# Patient Record
Sex: MALE | Race: WHITE | NOT HISPANIC OR LATINO | Employment: UNEMPLOYED | URBAN - NONMETROPOLITAN AREA
[De-identification: names, ages, dates, MRNs, and addresses within clinical notes are randomized per-mention and may not be internally consistent; named-entity substitution may affect disease eponyms.]

---

## 2017-09-12 ENCOUNTER — APPOINTMENT (OUTPATIENT)
Dept: GENERAL RADIOLOGY | Facility: HOSPITAL | Age: 57
End: 2017-09-12

## 2017-09-12 ENCOUNTER — HOSPITAL ENCOUNTER (EMERGENCY)
Facility: HOSPITAL | Age: 57
Discharge: HOME | End: 2017-09-12
Attending: EMERGENCY MEDICINE | Admitting: EMERGENCY MEDICINE

## 2017-09-12 VITALS
HEIGHT: 67 IN | DIASTOLIC BLOOD PRESSURE: 78 MMHG | BODY MASS INDEX: 21.97 KG/M2 | OXYGEN SATURATION: 98 % | RESPIRATION RATE: 16 BRPM | SYSTOLIC BLOOD PRESSURE: 125 MMHG | WEIGHT: 140 LBS | TEMPERATURE: 98 F | HEART RATE: 80 BPM

## 2017-09-12 DIAGNOSIS — R93.89 ABNORMAL CXR: ICD-10-CM

## 2017-09-12 DIAGNOSIS — F10.929 ALCOHOL INTOXICATION, WITH UNSPECIFIED COMPLICATION (HCC): Primary | ICD-10-CM

## 2017-09-12 DIAGNOSIS — E87.1 HYPONATREMIA: ICD-10-CM

## 2017-09-12 DIAGNOSIS — R74.8 ELEVATED LIVER ENZYMES: ICD-10-CM

## 2017-09-12 LAB
ALBUMIN SERPL-MCNC: 4.8 G/DL (ref 3.5–5)
ALBUMIN/GLOB SERPL: 1.4 G/DL (ref 1.1–2.5)
ALP SERPL-CCNC: 122 U/L (ref 24–120)
ALT SERPL W P-5'-P-CCNC: 80 U/L (ref 0–54)
AMPHET+METHAMPHET UR QL: NEGATIVE
ANION GAP SERPL CALCULATED.3IONS-SCNC: 18 MMOL/L (ref 4–13)
APTT PPP: 30.1 SECONDS (ref 24.1–34.8)
AST SERPL-CCNC: 89 U/L (ref 7–45)
BARBITURATES UR QL SCN: NEGATIVE
BASOPHILS # BLD AUTO: 0.03 10*3/MM3 (ref 0–0.2)
BASOPHILS NFR BLD AUTO: 0.3 % (ref 0–2)
BENZODIAZ UR QL SCN: NEGATIVE
BILIRUB SERPL-MCNC: 0.7 MG/DL (ref 0.1–1)
BILIRUB UR QL STRIP: NEGATIVE
BUN BLD-MCNC: 17 MG/DL (ref 5–21)
BUN/CREAT SERPL: 18.5 (ref 7–25)
CALCIUM SPEC-SCNC: 9.3 MG/DL (ref 8.4–10.4)
CANNABINOIDS SERPL QL: NEGATIVE
CHLORIDE SERPL-SCNC: 109 MMOL/L (ref 98–110)
CLARITY UR: CLEAR
CO2 SERPL-SCNC: 19 MMOL/L (ref 24–31)
COCAINE UR QL: NEGATIVE
COLOR UR: YELLOW
CREAT BLD-MCNC: 0.92 MG/DL (ref 0.5–1.4)
DEPRECATED RDW RBC AUTO: 45.4 FL (ref 40–54)
EOSINOPHIL # BLD AUTO: 0.17 10*3/MM3 (ref 0–0.7)
EOSINOPHIL NFR BLD AUTO: 1.6 % (ref 0–4)
ERYTHROCYTE [DISTWIDTH] IN BLOOD BY AUTOMATED COUNT: 14.5 % (ref 12–15)
ETHANOL UR QL: 0.28 %
ETHANOL UR QL: 0.39 %
GFR SERPL CREATININE-BSD FRML MDRD: 85 ML/MIN/1.73
GLOBULIN UR ELPH-MCNC: 3.4 GM/DL
GLUCOSE BLD-MCNC: 138 MG/DL (ref 70–100)
GLUCOSE UR STRIP-MCNC: NEGATIVE MG/DL
HCT VFR BLD AUTO: 41.6 % (ref 40–52)
HGB BLD-MCNC: 14.3 G/DL (ref 14–18)
HGB UR QL STRIP.AUTO: NEGATIVE
IMM GRANULOCYTES # BLD: 0.09 10*3/MM3 (ref 0–0.03)
IMM GRANULOCYTES NFR BLD: 0.8 % (ref 0–5)
INR PPP: 1.03 (ref 0.91–1.09)
KETONES UR QL STRIP: NEGATIVE
LEUKOCYTE ESTERASE UR QL STRIP.AUTO: NEGATIVE
LYMPHOCYTES # BLD AUTO: 4.25 10*3/MM3 (ref 0.72–4.86)
LYMPHOCYTES NFR BLD AUTO: 39.6 % (ref 15–45)
MCH RBC QN AUTO: 29.3 PG (ref 28–32)
MCHC RBC AUTO-ENTMCNC: 34.4 G/DL (ref 33–36)
MCV RBC AUTO: 85.2 FL (ref 82–95)
METHADONE UR QL SCN: NEGATIVE
MONOCYTES # BLD AUTO: 0.74 10*3/MM3 (ref 0.19–1.3)
MONOCYTES NFR BLD AUTO: 6.9 % (ref 4–12)
NEUTROPHILS # BLD AUTO: 5.44 10*3/MM3 (ref 1.87–8.4)
NEUTROPHILS NFR BLD AUTO: 50.8 % (ref 39–78)
NITRITE UR QL STRIP: NEGATIVE
NT-PROBNP SERPL-MCNC: 95.7 PG/ML (ref 0–900)
OPIATES UR QL: POSITIVE
PCP UR QL SCN: NEGATIVE
PH UR STRIP.AUTO: 5.5 [PH] (ref 5–8)
PLATELET # BLD AUTO: 211 10*3/MM3 (ref 130–400)
PMV BLD AUTO: 10.8 FL (ref 6–12)
POTASSIUM BLD-SCNC: 4.6 MMOL/L (ref 3.5–5.3)
PROT SERPL-MCNC: 8.2 G/DL (ref 6.3–8.7)
PROT UR QL STRIP: NEGATIVE
PROTHROMBIN TIME: 13.8 SECONDS (ref 11.9–14.6)
RBC # BLD AUTO: 4.88 10*6/MM3 (ref 4.8–5.9)
SODIUM BLD-SCNC: 146 MMOL/L (ref 135–145)
SP GR UR STRIP: 1.01 (ref 1–1.03)
UROBILINOGEN UR QL STRIP: NORMAL
WBC NRBC COR # BLD: 10.72 10*3/MM3 (ref 4.8–10.8)

## 2017-09-12 PROCEDURE — 36415 COLL VENOUS BLD VENIPUNCTURE: CPT

## 2017-09-12 PROCEDURE — 96361 HYDRATE IV INFUSION ADD-ON: CPT

## 2017-09-12 PROCEDURE — 80307 DRUG TEST PRSMV CHEM ANLYZR: CPT | Performed by: EMERGENCY MEDICINE

## 2017-09-12 PROCEDURE — 83880 ASSAY OF NATRIURETIC PEPTIDE: CPT | Performed by: EMERGENCY MEDICINE

## 2017-09-12 PROCEDURE — 85610 PROTHROMBIN TIME: CPT | Performed by: EMERGENCY MEDICINE

## 2017-09-12 PROCEDURE — 25010000002 THIAMINE PER 100 MG: Performed by: EMERGENCY MEDICINE

## 2017-09-12 PROCEDURE — 99284 EMERGENCY DEPT VISIT MOD MDM: CPT

## 2017-09-12 PROCEDURE — 96360 HYDRATION IV INFUSION INIT: CPT

## 2017-09-12 PROCEDURE — 81003 URINALYSIS AUTO W/O SCOPE: CPT | Performed by: EMERGENCY MEDICINE

## 2017-09-12 PROCEDURE — 71010 HC CHEST PA OR AP: CPT

## 2017-09-12 PROCEDURE — 80053 COMPREHEN METABOLIC PANEL: CPT | Performed by: EMERGENCY MEDICINE

## 2017-09-12 PROCEDURE — 73552 X-RAY EXAM OF FEMUR 2/>: CPT

## 2017-09-12 PROCEDURE — 85730 THROMBOPLASTIN TIME PARTIAL: CPT | Performed by: EMERGENCY MEDICINE

## 2017-09-12 PROCEDURE — 85025 COMPLETE CBC W/AUTO DIFF WBC: CPT | Performed by: EMERGENCY MEDICINE

## 2017-09-12 RX ORDER — DEXTROSE MONOHYDRATE 50 MG/ML
100 INJECTION, SOLUTION INTRAVENOUS CONTINUOUS
Status: DISCONTINUED | OUTPATIENT
Start: 2017-09-12 | End: 2017-09-13 | Stop reason: HOSPADM

## 2017-09-12 RX ORDER — THIAMINE HYDROCHLORIDE 100 MG/ML
100 INJECTION, SOLUTION INTRAMUSCULAR; INTRAVENOUS ONCE
Status: COMPLETED | OUTPATIENT
Start: 2017-09-12 | End: 2017-09-12

## 2017-09-12 RX ADMIN — DEXTROSE MONOHYDRATE 100 ML/HR: 50 INJECTION, SOLUTION INTRAVENOUS at 16:09

## 2017-09-12 RX ADMIN — THIAMINE HYDROCHLORIDE 100 MG: 100 INJECTION, SOLUTION INTRAMUSCULAR; INTRAVENOUS at 15:49

## 2017-09-12 RX ADMIN — SODIUM CHLORIDE 500 ML: 9 INJECTION, SOLUTION INTRAVENOUS at 15:02

## 2017-09-12 NOTE — ED PROVIDER NOTES
Subjective   HPI Comments: Patient is a 57-year-old male who reports he did drink 1 pint of vodka today and the patient reports that he fell about 2 hours ago and hit his left leg on a tree.  The patient is unable to give any other information.      History provided by:  Patient      Review of Systems   Unable to perform ROS: Other       No past medical history on file.    No Known Allergies    No past surgical history on file.    No family history on file.    Social History     Social History   • Marital status: Single     Spouse name: N/A   • Number of children: N/A   • Years of education: N/A     Social History Main Topics   • Smoking status: Not on file   • Smokeless tobacco: Not on file   • Alcohol use Not on file   • Drug use: Not on file   • Sexual activity: Not on file     Other Topics Concern   • Not on file     Social History Narrative           Objective   Physical Exam   Constitutional: He is oriented to person, place, and time.   Patient smells of alcohol and is a very poor historian.   HENT:   Head: Normocephalic and atraumatic.   Right Ear: External ear normal.   Left Ear: External ear normal.   Nose: Nose normal.   Mouth/Throat: Oropharynx is clear and moist.   Eyes: Conjunctivae and EOM are normal. Pupils are equal, round, and reactive to light. Right eye exhibits no discharge. Left eye exhibits no discharge.   Neck: Normal range of motion. Neck supple. No tracheal deviation present. No thyromegaly present.   Cardiovascular: Normal rate, regular rhythm, normal heart sounds and intact distal pulses.    No murmur heard.  Pulmonary/Chest: Effort normal and breath sounds normal. No respiratory distress. He exhibits no tenderness.   Occasional coughing.   Abdominal: Soft. He exhibits no distension. There is no tenderness.   Musculoskeletal: Normal range of motion. He exhibits tenderness (Mild pain to palpation of the left anterior distal femur area.). He exhibits no edema or deformity.   Neurological: He  is alert and oriented to person, place, and time. No cranial nerve deficit.   Skin: Skin is warm and dry. No erythema. No pallor.   Psychiatric:   Very poor historian and slow speaking male.   Nursing note and vitals reviewed.      Procedures         ED Course  ED Course                  MDM  Number of Diagnoses or Management Options  Abnormal CXR: new and requires workup  Alcohol intoxication, with unspecified complication:   Elevated liver enzymes:   Hyponatremia: minor     Amount and/or Complexity of Data Reviewed  Clinical lab tests: ordered and reviewed  Tests in the radiology section of CPT®: ordered and reviewed        Final diagnoses:   Alcohol intoxication, with unspecified complication   Elevated liver enzymes   Hyponatremia   Abnormal CXR            Naveed Butts MD  09/13/17 0958

## 2017-09-12 NOTE — ED NOTES
PT HAS VOIDED AGAIN PER URINAL AND IS NOW TALKING ON PHONE WITH SISTER     Toyin Henderson, WINTER  09/12/17 2896

## 2017-09-13 NOTE — ED NOTES
Pt's daughter states there is someone coming to pick him up in the next hour or so. MD made aware.      Dony Sanchez RN  09/12/17 1917

## 2017-09-13 NOTE — ED NOTES
Pt sitting up in bed alert and oriented. Pt given something to drink. States he does not have anyone else he can call to pick him up. Advised pt he will have to stay until his BA is below legal limit unless a ride is present. Pt voiced understanding.      Dony Sanchez RN  09/12/17 2021

## 2019-06-03 ENCOUNTER — HOSPITAL ENCOUNTER (OUTPATIENT)
Facility: HOSPITAL | Age: 59
Setting detail: OBSERVATION
Discharge: NURSING, REHAB OR LONG-TERM CARE | End: 2019-06-04
Attending: NEUROLOGICAL SURGERY | Admitting: NEUROLOGICAL SURGERY

## 2019-06-03 DIAGNOSIS — Z74.09 IMPAIRED FUNCTIONAL MOBILITY, BALANCE, GAIT, AND ENDURANCE: ICD-10-CM

## 2019-06-03 PROCEDURE — G0378 HOSPITAL OBSERVATION PER HR: HCPCS

## 2019-06-03 RX ORDER — BUTALBITAL, ACETAMINOPHEN AND CAFFEINE 50; 325; 40 MG/1; MG/1; MG/1
1 TABLET ORAL EVERY 4 HOURS PRN
Status: DISCONTINUED | OUTPATIENT
Start: 2019-06-03 | End: 2019-06-04 | Stop reason: HOSPADM

## 2019-06-03 RX ADMIN — BUTALBITAL, ACETAMINOPHEN, AND CAFFEINE 1 TABLET: 50; 325; 40 TABLET ORAL at 23:43

## 2019-06-04 VITALS
HEIGHT: 66 IN | HEART RATE: 65 BPM | WEIGHT: 133.31 LBS | TEMPERATURE: 97.5 F | BODY MASS INDEX: 21.43 KG/M2 | RESPIRATION RATE: 18 BRPM | SYSTOLIC BLOOD PRESSURE: 120 MMHG | DIASTOLIC BLOOD PRESSURE: 72 MMHG | OXYGEN SATURATION: 96 %

## 2019-06-04 PROBLEM — S06.33AA CEREBRAL CONTUSION (HCC): Status: ACTIVE | Noted: 2019-06-04

## 2019-06-04 LAB — GLUCOSE BLDC GLUCOMTR-MCNC: 119 MG/DL (ref 70–130)

## 2019-06-04 PROCEDURE — 99219 PR INITIAL OBSERVATION CARE/DAY 50 MINUTES: CPT | Performed by: NURSE PRACTITIONER

## 2019-06-04 PROCEDURE — 82962 GLUCOSE BLOOD TEST: CPT

## 2019-06-04 PROCEDURE — 97161 PT EVAL LOW COMPLEX 20 MIN: CPT | Performed by: PHYSICAL THERAPIST

## 2019-06-04 PROCEDURE — G0378 HOSPITAL OBSERVATION PER HR: HCPCS

## 2019-06-04 RX ORDER — LEVETIRACETAM 500 MG/1
500 TABLET ORAL 2 TIMES DAILY
Status: DISCONTINUED | OUTPATIENT
Start: 2019-06-04 | End: 2019-06-04 | Stop reason: HOSPADM

## 2019-06-04 RX ORDER — DOCUSATE SODIUM 100 MG/1
100 CAPSULE, LIQUID FILLED ORAL 2 TIMES DAILY
Status: DISCONTINUED | OUTPATIENT
Start: 2019-06-04 | End: 2019-06-04 | Stop reason: HOSPADM

## 2019-06-04 RX ORDER — THIAMINE MONONITRATE (VIT B1) 100 MG
100 TABLET ORAL DAILY
Status: DISCONTINUED | OUTPATIENT
Start: 2019-06-04 | End: 2019-06-04 | Stop reason: HOSPADM

## 2019-06-04 RX ORDER — THIAMINE MONONITRATE (VIT B1) 100 MG
100 TABLET ORAL DAILY
Status: ON HOLD | COMMUNITY
End: 2021-01-01

## 2019-06-04 RX ORDER — METOPROLOL SUCCINATE 50 MG/1
50 TABLET, EXTENDED RELEASE ORAL DAILY
Status: ON HOLD | COMMUNITY
End: 2021-01-01

## 2019-06-04 RX ORDER — SERTRALINE HYDROCHLORIDE 100 MG/1
150 TABLET, FILM COATED ORAL NIGHTLY
Status: ON HOLD | COMMUNITY
End: 2021-01-01

## 2019-06-04 RX ORDER — FOLIC ACID 1 MG/1
1 TABLET ORAL DAILY
Status: ON HOLD | COMMUNITY
End: 2021-01-01

## 2019-06-04 RX ORDER — HYDRALAZINE HYDROCHLORIDE 10 MG/1
10 TABLET, FILM COATED ORAL 3 TIMES DAILY PRN
Status: DISCONTINUED | OUTPATIENT
Start: 2019-06-04 | End: 2019-06-04 | Stop reason: HOSPADM

## 2019-06-04 RX ORDER — SIMVASTATIN 10 MG
10 TABLET ORAL NIGHTLY
Status: ON HOLD | COMMUNITY
End: 2021-01-01

## 2019-06-04 RX ORDER — ONDANSETRON 2 MG/ML
4 INJECTION INTRAMUSCULAR; INTRAVENOUS EVERY 6 HOURS PRN
Status: DISCONTINUED | OUTPATIENT
Start: 2019-06-04 | End: 2019-06-04 | Stop reason: HOSPADM

## 2019-06-04 RX ORDER — MELOXICAM 15 MG/1
15 TABLET ORAL DAILY
Status: ON HOLD | COMMUNITY
End: 2020-08-12

## 2019-06-04 RX ORDER — GABAPENTIN 300 MG/1
300 CAPSULE ORAL 3 TIMES DAILY
Status: ON HOLD | COMMUNITY
End: 2021-01-01

## 2019-06-04 RX ORDER — PANTOPRAZOLE SODIUM 40 MG/1
40 TABLET, DELAYED RELEASE ORAL 2 TIMES DAILY
Status: ON HOLD | COMMUNITY
End: 2021-01-01

## 2019-06-04 RX ORDER — ATORVASTATIN CALCIUM 10 MG/1
10 TABLET, FILM COATED ORAL DAILY
Status: DISCONTINUED | OUTPATIENT
Start: 2019-06-04 | End: 2019-06-04 | Stop reason: HOSPADM

## 2019-06-04 RX ORDER — ZOLPIDEM TARTRATE 5 MG/1
5 TABLET ORAL NIGHTLY
Status: ON HOLD | COMMUNITY
End: 2021-01-01

## 2019-06-04 RX ORDER — DOCUSATE SODIUM 100 MG/1
100 CAPSULE, LIQUID FILLED ORAL 2 TIMES DAILY
Status: ON HOLD | COMMUNITY
End: 2021-01-01

## 2019-06-04 RX ORDER — TRAMADOL HYDROCHLORIDE 50 MG/1
100 TABLET ORAL EVERY 8 HOURS PRN
Status: DISCONTINUED | OUTPATIENT
Start: 2019-06-04 | End: 2019-06-04 | Stop reason: HOSPADM

## 2019-06-04 RX ORDER — CLONAZEPAM 0.5 MG/1
0.5 TABLET ORAL 2 TIMES DAILY
Status: ON HOLD | COMMUNITY
End: 2021-01-01

## 2019-06-04 RX ORDER — LISINOPRIL 20 MG/1
20 TABLET ORAL DAILY
Status: ON HOLD | COMMUNITY
End: 2020-08-16 | Stop reason: SDUPTHER

## 2019-06-04 RX ORDER — ACETAMINOPHEN 325 MG/1
650 TABLET ORAL EVERY 6 HOURS PRN
Status: ON HOLD | COMMUNITY
End: 2021-01-01

## 2019-06-04 RX ORDER — PANTOPRAZOLE SODIUM 40 MG/1
40 TABLET, DELAYED RELEASE ORAL DAILY
Status: DISCONTINUED | OUTPATIENT
Start: 2019-06-04 | End: 2019-06-04 | Stop reason: HOSPADM

## 2019-06-04 RX ORDER — SODIUM CHLORIDE 0.9 % (FLUSH) 0.9 %
3 SYRINGE (ML) INJECTION EVERY 12 HOURS SCHEDULED
Status: DISCONTINUED | OUTPATIENT
Start: 2019-06-04 | End: 2019-06-04 | Stop reason: HOSPADM

## 2019-06-04 RX ORDER — ACETAMINOPHEN 325 MG/1
650 TABLET ORAL EVERY 4 HOURS PRN
Status: DISCONTINUED | OUTPATIENT
Start: 2019-06-04 | End: 2019-06-04 | Stop reason: HOSPADM

## 2019-06-04 RX ORDER — TRAMADOL HYDROCHLORIDE 50 MG/1
50 TABLET ORAL EVERY 8 HOURS PRN
COMMUNITY
End: 2020-08-16 | Stop reason: HOSPADM

## 2019-06-04 RX ORDER — LISINOPRIL 20 MG/1
20 TABLET ORAL DAILY
Status: DISCONTINUED | OUTPATIENT
Start: 2019-06-04 | End: 2019-06-04 | Stop reason: HOSPADM

## 2019-06-04 RX ORDER — GABAPENTIN 300 MG/1
300 CAPSULE ORAL NIGHTLY
Status: DISCONTINUED | OUTPATIENT
Start: 2019-06-04 | End: 2019-06-04 | Stop reason: HOSPADM

## 2019-06-04 RX ORDER — LEVETIRACETAM 500 MG/1
500 TABLET ORAL 2 TIMES DAILY
Qty: 10 TABLET | Refills: 0 | Status: SHIPPED | OUTPATIENT
Start: 2019-06-04 | End: 2019-06-09

## 2019-06-04 RX ORDER — ONDANSETRON 4 MG/1
4 TABLET, FILM COATED ORAL EVERY 6 HOURS PRN
Status: ON HOLD | COMMUNITY
End: 2021-01-01

## 2019-06-04 RX ORDER — HYDRALAZINE HYDROCHLORIDE 10 MG/1
10 TABLET, FILM COATED ORAL EVERY 8 HOURS PRN
COMMUNITY
End: 2020-08-16 | Stop reason: HOSPADM

## 2019-06-04 RX ORDER — BUTALBITAL, ACETAMINOPHEN AND CAFFEINE 50; 325; 40 MG/1; MG/1; MG/1
1 TABLET ORAL EVERY 4 HOURS PRN
Qty: 20 TABLET | Refills: 0 | Status: ON HOLD | OUTPATIENT
Start: 2019-06-04 | End: 2020-08-12

## 2019-06-04 RX ORDER — MIDODRINE HYDROCHLORIDE 5 MG/1
5 TABLET ORAL 3 TIMES DAILY
COMMUNITY
End: 2020-08-16 | Stop reason: HOSPADM

## 2019-06-04 RX ORDER — ACETAMINOPHEN 325 MG/1
650 TABLET ORAL EVERY 6 HOURS PRN
Status: DISCONTINUED | OUTPATIENT
Start: 2019-06-04 | End: 2019-06-04 | Stop reason: HOSPADM

## 2019-06-04 RX ORDER — ACETAMINOPHEN 650 MG/1
650 SUPPOSITORY RECTAL EVERY 4 HOURS PRN
Status: DISCONTINUED | OUTPATIENT
Start: 2019-06-04 | End: 2019-06-04 | Stop reason: HOSPADM

## 2019-06-04 RX ORDER — METOPROLOL SUCCINATE 50 MG/1
50 TABLET, EXTENDED RELEASE ORAL DAILY
Status: DISCONTINUED | OUTPATIENT
Start: 2019-06-04 | End: 2019-06-04 | Stop reason: HOSPADM

## 2019-06-04 RX ORDER — CLONAZEPAM 0.5 MG/1
0.5 TABLET ORAL 2 TIMES DAILY
Status: DISCONTINUED | OUTPATIENT
Start: 2019-06-04 | End: 2019-06-04 | Stop reason: HOSPADM

## 2019-06-04 RX ORDER — ONDANSETRON 4 MG/1
4 TABLET, FILM COATED ORAL EVERY 6 HOURS PRN
Status: DISCONTINUED | OUTPATIENT
Start: 2019-06-04 | End: 2019-06-04 | Stop reason: HOSPADM

## 2019-06-04 RX ORDER — SODIUM CHLORIDE 0.9 % (FLUSH) 0.9 %
3-10 SYRINGE (ML) INJECTION AS NEEDED
Status: DISCONTINUED | OUTPATIENT
Start: 2019-06-04 | End: 2019-06-04 | Stop reason: HOSPADM

## 2019-06-04 RX ORDER — ASPIRIN 81 MG/1
81 TABLET, CHEWABLE ORAL DAILY
Status: ON HOLD | COMMUNITY
End: 2020-08-12 | Stop reason: DRUGHIGH

## 2019-06-04 RX ORDER — FOLIC ACID 1 MG/1
1 TABLET ORAL DAILY
Status: DISCONTINUED | OUTPATIENT
Start: 2019-06-04 | End: 2019-06-04 | Stop reason: HOSPADM

## 2019-06-04 RX ADMIN — METOPROLOL SUCCINATE 50 MG: 50 TABLET, FILM COATED, EXTENDED RELEASE ORAL at 10:07

## 2019-06-04 RX ADMIN — LISINOPRIL 20 MG: 20 TABLET ORAL at 10:07

## 2019-06-04 RX ADMIN — DOCUSATE SODIUM 100 MG: 100 CAPSULE ORAL at 10:07

## 2019-06-04 RX ADMIN — SODIUM CHLORIDE, PRESERVATIVE FREE 3 ML: 5 INJECTION INTRAVENOUS at 10:06

## 2019-06-04 RX ADMIN — PANTOPRAZOLE SODIUM 40 MG: 40 TABLET, DELAYED RELEASE ORAL at 10:07

## 2019-06-04 RX ADMIN — TRAMADOL HYDROCHLORIDE 100 MG: 50 TABLET, FILM COATED ORAL at 12:05

## 2019-06-04 RX ADMIN — FOLIC ACID 1 MG: 1 TABLET ORAL at 10:07

## 2019-06-04 RX ADMIN — ATORVASTATIN CALCIUM 10 MG: 10 TABLET, FILM COATED ORAL at 10:06

## 2019-06-04 RX ADMIN — BUTALBITAL, ACETAMINOPHEN, AND CAFFEINE 1 TABLET: 50; 325; 40 TABLET ORAL at 03:40

## 2019-06-04 RX ADMIN — BUTALBITAL, ACETAMINOPHEN, AND CAFFEINE 1 TABLET: 50; 325; 40 TABLET ORAL at 08:32

## 2019-06-04 RX ADMIN — CLONAZEPAM 0.5 MG: 0.5 TABLET ORAL at 10:09

## 2019-06-04 RX ADMIN — Medication 100 MG: at 10:07

## 2019-06-04 RX ADMIN — LEVETIRACETAM 500 MG: 500 TABLET, FILM COATED ORAL at 10:07

## 2019-06-04 RX ADMIN — BUTALBITAL, ACETAMINOPHEN, AND CAFFEINE 1 TABLET: 50; 325; 40 TABLET ORAL at 16:26
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
## 2019-06-04 NOTE — PLAN OF CARE
Problem: Patient Care Overview  Goal: Plan of Care Review  Outcome: Ongoing (interventions implemented as appropriate)   06/04/19 6205   Coping/Psychosocial   Plan of Care Reviewed With patient   Plan of Care Review   Progress improving   OTHER   Outcome Summary A&Ox4. Pain controlled with ordered meds. EJ d/c'ed. D/c'ed back to [facility], who also transported pt.        Problem: Fall Risk (Adult)  Goal: Absence of Fall  Outcome: Ongoing (interventions implemented as appropriate)      Problem: Pain, Chronic (Adult)  Goal: Identify Related Risk Factors and Signs and Symptoms  Outcome: Ongoing (interventions implemented as appropriate)      Problem: Brain Injury, Moderate Traumatic (GCS 9-12) (Adult)  Goal: Signs and Symptoms of Listed Potential Problems Will be Absent, Minimized or Managed (Brain Injury, Moderate Traumatic)  Outcome: Ongoing (interventions implemented as appropriate)

## 2019-06-04 NOTE — PLAN OF CARE
Problem: Patient Care Overview  Goal: Plan of Care Review  Outcome: Ongoing (interventions implemented as appropriate)   06/04/19 0328   Coping/Psychosocial   Plan of Care Reviewed With patient   Plan of Care Review   Progress no change   OTHER   Outcome Summary Pt A&O. Co pain, Fiorcet works well to contorl headache. Rests between care. No neuro changes. Cont to monitor.        Problem: Fall Risk (Adult)  Goal: Identify Related Risk Factors and Signs and Symptoms  Outcome: Outcome(s) achieved Date Met: 06/04/19    Goal: Absence of Fall  Outcome: Ongoing (interventions implemented as appropriate)      Problem: Brain Injury, Moderate Traumatic (GCS 9-12) (Adult)  Goal: Signs and Symptoms of Listed Potential Problems Will be Absent, Minimized or Managed (Brain Injury, Moderate Traumatic)  Outcome: Ongoing (interventions implemented as appropriate)

## 2019-06-04 NOTE — H&P
NEUROSURGERY INITIAL HOSPITAL ENCOUNTER    Assessment/Plan:   Lance Rea is a 59 y.o. male with a significant comorbidity COPD, hypertension, hyperlipidemia, hepatitis C, anxiety, depression, and alcohol abuse.  He presents with a new problem of fall 4 days prior. Physical exam findings of contusion to right forehead and hematoma around right eye, relatively normal neurologic exam.  Their imaging shows right frontal skull base 1 cm cerebral contusion with very mild amount of vasogenic edema surrounding.  No evidence of skull fracture.  Right frontal subcutaneous hematoma.  No evidence of hydrocephalus or intracranial blood products otherwise.    Differential Diagnosis:   Cerebral contusion    Recommendations:  Conservative management  Keppra 500 mg p.o. twice daily for 5 days for seizure prophylaxis  PT evaluation  Fioricet for headache    I discussed the patients findings and my recommendations with patient and nursing staff    Thank you very much for this interesting consult.     Level of Risk: Moderate due to: undiagnosed new problem  MDM: Moderate Complexity  Mod = 11213, High=99223  ___________________________________________________________________    Reason for consult  Cerebral contusion    Chief Complaint: Fall    HPI: Lance Rea is a 59 y.o. male with a significant comorbidity COPD, hypertension, hyperlipidemia, hepatitis C, anxiety, depression, and alcohol abuse with last drink approximately 1 month  ago.  He was transferred to [facility] from [facility] due to image findings of cerebral contusion and associated complaint of headache. Mr. Rea states he slipped and fell from a standing position landing on the concrete floor while at his assisted living residence, [facility].  Onset approximately 4 days prior.  He denies loss of consciousness with fall.  He additionally denies dizziness, blurred vision, nausea, vomiting, or seizure-like activity.    Review of Systems    Constitutional: Negative.    Eyes: Negative.  Negative for visual disturbance.   Respiratory: Negative.    Cardiovascular: Negative.    Gastrointestinal: Negative.    Endocrine: Negative.    Genitourinary: Negative.    Musculoskeletal: Negative.    Skin: Negative.    Allergic/Immunologic: Negative.    Neurological: Positive for headaches. Negative for dizziness and syncope.   Hematological: Negative.    Psychiatric/Behavioral: Negative.    All other systems reviewed and are negative.     Past Medical History:  has a past medical history of Anxiety, COPD (chronic obstructive pulmonary disease) (CMS/[facility]), Depression, Encephalopathy, GERD (gastroesophageal reflux disease), Hepatitis C, Hyperlipidemia, Hypertension, Muscle weakness, and Stroke (CMS/[facility]).    Past Surgical History:  has a past surgical history that includes Cervical spine surgery; Replacement total knee bilateral; Femur Surgery; Cholecystectomy; Colonoscopy; and Esophagogastroduodenoscopy.    Family History: family history is not on file.    Social History:  reports that he has been smoking.  He has never used smokeless tobacco. He reports that he drinks alcohol. He reports that he does not use drugs.    Allergies: Codeine    Home Medications:   Current Facility-Administered Medications:   •  butalbital-acetaminophen-caffeine (FIORICET, ESGIC) -40 MG per tablet 1 tablet, 1 tablet, Oral, Q4H PRN, Nick Carver MD, 1 tablet at 06/04/19 0340    Medications: Scheduled Meds:   Continuous Infusions:   PRN Meds:.•  butalbital-acetaminophen-caffeine    Vital SignsTemp:  [98 °F (36.7 °C)-98.2 °F (36.8 °C)] 98 °F (36.7 °C)  Heart Rate:  [72-77] 77  Resp:  [18] 18  BP: (104-125)/(69-75) 104/69    Physical Exam  Physical Exam   Constitutional: He is oriented to person, place, and time. He appears well-developed and well-nourished.  Non-toxic appearance. He does not have a sickly appearance. He does not appear ill. No distress.   HENT:   Head:  Normocephalic and atraumatic.       Right Ear: Hearing normal.   Left Ear: Hearing normal.   Mouth/Throat: Mucous membranes are normal.   Eyes: Conjunctivae and EOM are normal. Pupils are equal, round, and reactive to light.   Neck: Trachea normal and full passive range of motion without pain. Neck supple.   Cardiovascular: Normal rate and regular rhythm.   Pulmonary/Chest: Effort normal. No accessory muscle usage. No apnea, no tachypnea and no bradypnea. No respiratory distress.   Abdominal: Soft. Normal appearance.   Neurological: He is alert and oriented to person, place, and time.   Reflex Scores:       Bicep reflexes are 2+ on the right side and 2+ on the left side.       Achilles reflexes are 2+ on the right side and 2+ on the left side.  Skin: Skin is warm, dry and intact.   Psychiatric: He has a normal mood and affect. His speech is normal and behavior is normal.   Nursing note and vitals reviewed.    Neurologic Exam     Mental Status   Oriented to person, place, and time.   Attention: normal. Concentration: normal.   Speech: speech is normal   Level of consciousness: alert  Alert and oriented x3.  Follows simple commands.     Cranial Nerves     CN II   Visual fields full to confrontation.     CN III, IV, VI   Pupils are equal, round, and reactive to light.  Extraocular motions are normal.     CN V   Facial sensation intact.     CN VII   Facial expression full, symmetric.     CN VIII   CN VIII normal.     CN IX, X   CN IX normal.     CN XI   CN XI normal.     Motor Exam   Muscle bulk: normal  Overall muscle tone: normal  Right arm tone: normal  Left arm tone: normal  Right arm pronator drift: absent  Left arm pronator drift: absent  Right leg tone: normal  Left leg tone: normal    Strength   Right deltoid: 5/5  Left deltoid: 5/5  Right biceps: 5/5  Left biceps: 5/5  Right triceps: 5/5  Left triceps: 5/5  Right wrist extension: 5/5  Left wrist extension: 5/5  Right iliopsoas: 5/5  Left iliopsoas: 5/5  Right  quadriceps: 5/5  Left quadriceps: 5/5  Right anterior tibial: 5/5  Left anterior tibial: 5/5  Right posterior tibial: 5/5  Left posterior tibial: 5/5  Moves all extremities equal and symmetric     Sensory Exam   Right arm light touch: normal  Left arm light touch: normal  Right leg light touch: normal  Left leg light touch: normal    Gait, Coordination, and Reflexes     Tremor   Resting tremor: absent  Intention tremor: absent  Action tremor: absent    Reflexes   Right biceps: 2+  Left biceps: 2+  Right achilles: 2+  Left achilles: 2+  Right plantar: normal  Left plantar: normal  Right Gaspar: absent  Left Gaspar: absent  Right ankle clonus: absent  Left ankle clonus: absent    Results Review:   Independent review and interpretation of imaging  Imaging Results (last 24 hours)     ** No results found for the last 24 hours. **        MRI brain:  MRI spine:   CT Head: Noncontrast CT of the head performed at outside hospital.  Evidence of a right frontal skull base 1 cm cerebral contusion with very mild amount of vasogenic edema surrounding.  No evidence of skull fracture.  Right frontal subcutaneous hematoma.  No evidence of hydrocephalus or intracranial blood products otherwise.      CT c-spine:  CT t-spine:  CT l-spine:  X-ray:    I reviewed the patient's new clinical results.  Lab Results (last 24 hours)     ** No results found for the last 24 hours. **        Dylan Ruffin, WAYNE
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
## 2019-06-05 NOTE — THERAPY DISCHARGE NOTE
Acute Care - Physical Therapy Discharge Summary  [facility]       Patient Name: Lance Rea  : 1960  MRN: [id number]    Today's Date: 2019  Onset of Illness/Injury or Date of Surgery: 19    Date of Referral to PT: 19  Referring Physician: Dr. Carver      Admit Date: 6/3/2019      PT Recommendation and Plan    Visit Dx:    ICD-10-CM ICD-9-CM   1. Impaired functional mobility, balance, gait, and endurance Z74.09 V49.89       Outcome Measures     Row Name 19 1039             How much help from another person do you currently need...    Turning from your back to your side while in flat bed without using bedrails?  4  -MS      Moving from lying on back to sitting on the side of a flat bed without bedrails?  4  -MS      Moving to and from a bed to a chair (including a wheelchair)?  3  -MS      Standing up from a chair using your arms (e.g., wheelchair, bedside chair)?  3  -MS      Climbing 3-5 steps with a railing?  3  -MS      To walk in hospital room?  3  -MS      AM-PAC 6 Clicks Score  20  -MS         Functional Assessment    Outcome Measure Options  AM-PAC 6 Clicks Basic Mobility (PT)  -MS        User Key  (r) = Recorded By, (t) = Taken By, (c) = Cosigned By    Initials Name Provider Type    Mala Whitfield R, PT, DPT, NCS Physical Therapist              Rehab Goal Summary     Row Name 19 0737             Gait Training Goal 1 (PT)    Activity/Assistive Device (Gait Training Goal 1, PT)  gait (walking locomotion);backward stepping;decrease fall risk;forward stepping;improve balance and speed;increase endurance/gait distance;sidestepping  -MF      [place] Level (Gait Training Goal 1, PT)  independent  -MF      Distance (Gait Goal 1, PT)  high level gait with no LOB  -MF      Time Frame (Gait Training Goal 1, PT)  long term goal (LTG);by discharge  -MF      Progress/Outcome (Gait Training Goal 1, PT)  goal not met  -MF         Balance Goal 1 (PT)    Activity/Assistive  Device (Balance Goal 1, PT)  standing, dynamic reaching in all directions  -MF      [place] Level/Cues Needed (Balance Goal 1, PT)  supervision required  -MF      Time Frame (Balance Goal 1, PT)  long term goal (LTG);by discharge  -MF      Progress/Outcomes (Balance Goal 1, PT)  goal not met  -MF        User Key  (r) = Recorded By, (t) = Taken By, (c) = Cosigned By    Initials Name Provider Type Discipline    Nayla Nunez, PTA Physical Therapy Assistant PT              PT Discharge Summary  Anticipated Discharge Disposition (PT): home  Reason for Discharge: Discharge from facility  Outcomes Achieved: Discharge from facility occurred on same date as evluation  Discharge Destination: Home      Nayla Zimmer PTA   6/5/2019

## 2019-06-26 NOTE — DISCHARGE SUMMARY
Date of Discharge:  6/26/2019    Discharge Diagnosis:   Patient Active Problem List   Diagnosis   • Cerebral contusion (CMS/HCC)     1. Impaired functional mobility, balance, gait, and endurance        Presenting Problem/History of Present Illness  Cerebral contusion (CMS/HCC) [S06.339A]   1. Impaired functional mobility, balance, gait, and endurance        Hospital Course  Lance Rea is a 59 y.o. male with a significant comorbidity COPD, hypertension, hyperlipidemia, hepatitis C, anxiety, depression, and alcohol abuse.  He presents with a new problem of fall 4 days prior. Physical exam findings of contusion to right forehead and hematoma around right eye, relatively normal neurologic exam.  Their imaging shows right frontal skull base 1 cm cerebral contusion with very mild amount of vasogenic edema surrounding.  No evidence of skull fracture.  Right frontal subcutaneous hematoma.  No evidence of hydrocephalus or intracranial blood products otherwise.    The patient was treated conservatively.  No further imaging was needed.  He was started on Keppra 500 p.o. twice daily for 5 days.  He had a physical therapy and Occupational Therapy and was deemed safe for discharge.  He was also given Fioricet for headaches.    Procedures Performed         Consults:   Consults     No orders found from 5/5/2019 to 6/4/2019.          Pertinent Test Results:     Imaging Results (last 24 hours)     ** No results found for the last 24 hours. **        Lab Results (last 24 hours)     Procedure Component Value Units Date/Time    POC Glucose Once [[id number]]  (Normal) Collected:  06/04/19 0814    Specimen:  Blood Updated:  06/04/19 0825     Glucose 119 mg/dL      Comment: : [id number] Carlson  CarolynMeter ID: [id number]             Condition on Discharge:  stable    Vital Signs       Physical Exam:   Physical Exam     Neurologic Exam   Mental Status   Oriented to person, place, and time.   Attention: normal.  Concentration: normal.   Speech: speech is normal   Level of consciousness: alert  Alert and oriented x3.  Follows simple commands.      Cranial Nerves      CN II   Visual fields full to confrontation.      CN III, IV, VI   Pupils are equal, round, and reactive to light.  Extraocular motions are normal.      CN V   Facial sensation intact.      CN VII   Facial expression full, symmetric.      CN VIII   CN VIII normal.      CN IX, X   CN IX normal.      CN XI   CN XI normal.      Motor Exam   Muscle bulk: normal  Overall muscle tone: normal  Right arm tone: normal  Left arm tone: normal  Right arm pronator drift: absent  Left arm pronator drift: absent  Right leg tone: normal  Left leg tone: normal     Strength   Right deltoid: 5/5  Left deltoid: 5/5  Right biceps: 5/5  Left biceps: 5/5  Right triceps: 5/5  Left triceps: 5/5  Right wrist extension: 5/5  Left wrist extension: 5/5  Right iliopsoas: 5/5  Left iliopsoas: 5/5  Right quadriceps: 5/5  Left quadriceps: 5/5  Right anterior tibial: 5/5  Left anterior tibial: 5/5  Right posterior tibial: 5/5  Left posterior tibial: 5/5  Moves all extremities equal and symmetric      Sensory Exam   Right arm light touch: normal  Left arm light touch: normal  Right leg light touch: normal  Left leg light touch: normal     Gait, Coordination, and Reflexes      Tremor   Resting tremor: absent  Intention tremor: absent  Action tremor: absent     Reflexes   Right biceps: 2+  Left biceps: 2+  Right achilles: 2+  Left achilles: 2+  Right plantar: normal  Left plantar: normal  Right Gaspar: absent  Left Gaspar: absent  Right ankle clonus: absent  Left ankle clonus: absent        Discharge Disposition  Skilled Nursing Facility (DC - External)    Discharge Medications     Discharge Medications      New Medications      Instructions Start Date   butalbital-acetaminophen-caffeine -40 MG per tablet  Commonly known as:  FIORICET, ESGIC   1 tablet, Oral, Every 4 Hours PRN          Continue These Medications      Instructions Start Date   acetaminophen 325 MG tablet  Commonly known as:  TYLENOL   650 mg, Oral, Every 6 Hours PRN      aspirin 81 MG chewable tablet   81 mg, Oral, Daily      BANOPHEN PO   50 mg, Oral, 4 Times Daily PRN      clonazePAM 0.5 MG tablet  Commonly known as:  KlonoPIN   0.5 mg, Oral, 2 Times Daily      docusate sodium 100 MG capsule  Commonly known as:  COLACE   100 mg, Oral, 2 Times Daily      folic acid 1 MG tablet  Commonly known as:  FOLVITE   1 mg, Oral, Daily      gabapentin 300 MG capsule  Commonly known as:  NEURONTIN   300 mg, Oral, Nightly      hydrALAZINE 10 MG tablet  Commonly known as:  APRESOLINE   10 mg, Oral, 3 Times Daily PRN      Lactobacillus Acidophilus powder   Oral, Nightly      lisinopril 20 MG tablet  Commonly known as:  PRINIVIL,ZESTRIL   20 mg, Oral, Daily      magnesium hydroxide 400 MG/5ML suspension  Commonly known as:  MILK OF MAGNESIA   30 mL, Oral, Daily PRN      meloxicam 15 MG tablet  Commonly known as:  MOBIC   15 mg, Oral, Daily      metoprolol succinate XL 50 MG 24 hr tablet  Commonly known as:  TOPROL-XL   50 mg, Oral, Daily      midodrine 5 MG tablet  Commonly known as:  PROAMATINE   5 mg, Oral, 3 Times Daily      ondansetron 4 MG tablet  Commonly known as:  ZOFRAN   4 mg, Oral, Every 8 Hours PRN      pantoprazole 40 MG EC tablet  Commonly known as:  PROTONIX   40 mg, Oral, Daily      sertraline 100 MG tablet  Commonly known as:  ZOLOFT   150 mg, Oral, Nightly      simvastatin 10 MG tablet  Commonly known as:  ZOCOR   10 mg, Oral, Nightly      thiamine 100 MG tablet  Commonly known as:  VITAMIN B-1   100 mg, Oral, Daily      traMADol 50 MG tablet  Commonly known as:  ULTRAM   100 mg, Oral, Every 8 Hours PRN      zolpidem 5 MG tablet  Commonly known as:  AMBIEN   5 mg, Oral, Nightly         ASK your doctor about these medications      Instructions Start Date   levETIRAcetam 500 MG tablet  Commonly known as:  KEPPRA  Ask about:  Should I take this medication?   500 mg, Oral, 2 Times Daily             Discharge Diet:   Diet Instructions     Regular diet                  Activity at Discharge:   Activity Instructions     Activity as directed by therapy and staff at facility                  Follow-up Appointments  No future appointments.      Test Results Pending at Discharge       Nick Carver MD  06/26/19  1:35 PM    Time: Discharge 10 min

## 2020-08-11 ENCOUNTER — HOSPITAL ENCOUNTER (INPATIENT)
Facility: HOSPITAL | Age: 60
LOS: 4 days | Discharge: HOME HEALTH | End: 2020-08-16
Attending: EMERGENCY MEDICINE | Admitting: INTERNAL MEDICINE

## 2020-08-11 DIAGNOSIS — Z78.9 DECREASED ACTIVITIES OF DAILY LIVING (ADL): ICD-10-CM

## 2020-08-11 DIAGNOSIS — N17.9 ACUTE RENAL FAILURE, UNSPECIFIED ACUTE RENAL FAILURE TYPE (HCC): Primary | ICD-10-CM

## 2020-08-11 DIAGNOSIS — Z74.09 IMPAIRED MOBILITY: ICD-10-CM

## 2020-08-11 DIAGNOSIS — A41.9 SEPSIS, DUE TO UNSPECIFIED ORGANISM, UNSPECIFIED WHETHER ACUTE ORGAN DYSFUNCTION PRESENT (HCC): ICD-10-CM

## 2020-08-11 PROCEDURE — 36415 COLL VENOUS BLD VENIPUNCTURE: CPT

## 2020-08-11 PROCEDURE — 99284 EMERGENCY DEPT VISIT MOD MDM: CPT

## 2020-08-12 ENCOUNTER — APPOINTMENT (OUTPATIENT)
Dept: CT IMAGING | Facility: HOSPITAL | Age: 60
End: 2020-08-12

## 2020-08-12 ENCOUNTER — APPOINTMENT (OUTPATIENT)
Dept: GENERAL RADIOLOGY | Facility: HOSPITAL | Age: 60
End: 2020-08-12

## 2020-08-12 PROBLEM — A41.9 SEPSIS, UNSPECIFIED ORGANISM (HCC): Status: ACTIVE | Noted: 2020-08-12

## 2020-08-12 PROBLEM — J18.9 HCAP (HEALTHCARE-ASSOCIATED PNEUMONIA): Status: ACTIVE | Noted: 2020-08-12

## 2020-08-12 PROBLEM — N17.9 ACUTE RENAL FAILURE (HCC): Status: ACTIVE | Noted: 2020-08-12

## 2020-08-12 PROBLEM — I10 BENIGN ESSENTIAL HTN: Status: ACTIVE | Noted: 2020-08-12

## 2020-08-12 PROBLEM — E78.5 HYPERLIPIDEMIA: Status: ACTIVE | Noted: 2020-08-12

## 2020-08-12 LAB
ALBUMIN SERPL-MCNC: 3.4 G/DL (ref 3.5–5.2)
ALBUMIN SERPL-MCNC: 4.1 G/DL (ref 3.5–5.2)
ALBUMIN/GLOB SERPL: 1.3 G/DL
ALBUMIN/GLOB SERPL: 1.3 G/DL
ALP SERPL-CCNC: 120 U/L (ref 39–117)
ALP SERPL-CCNC: 96 U/L (ref 39–117)
ALT SERPL W P-5'-P-CCNC: 36 U/L (ref 1–41)
ALT SERPL W P-5'-P-CCNC: 48 U/L (ref 1–41)
AMMONIA BLD-SCNC: 33 UMOL/L (ref 16–60)
AMPHET+METHAMPHET UR QL: NEGATIVE
AMPHETAMINES UR QL: NEGATIVE
ANION GAP SERPL CALCULATED.3IONS-SCNC: 13 MMOL/L (ref 5–15)
ANION GAP SERPL CALCULATED.3IONS-SCNC: 15 MMOL/L (ref 5–15)
ANION GAP SERPL CALCULATED.3IONS-SCNC: 19 MMOL/L (ref 5–15)
AST SERPL-CCNC: 28 U/L (ref 1–40)
AST SERPL-CCNC: 38 U/L (ref 1–40)
BACTERIA UR QL AUTO: ABNORMAL /HPF
BARBITURATES UR QL SCN: NEGATIVE
BASOPHILS # BLD AUTO: 0.06 10*3/MM3 (ref 0–0.2)
BASOPHILS # BLD AUTO: 0.08 10*3/MM3 (ref 0–0.2)
BASOPHILS NFR BLD AUTO: 0.2 % (ref 0–1.5)
BASOPHILS NFR BLD AUTO: 0.3 % (ref 0–1.5)
BENZODIAZ UR QL SCN: NEGATIVE
BILIRUB SERPL-MCNC: 0.4 MG/DL (ref 0–1.2)
BILIRUB SERPL-MCNC: 0.5 MG/DL (ref 0–1.2)
BILIRUB UR QL STRIP: NEGATIVE
BUN SERPL-MCNC: 30 MG/DL (ref 6–20)
BUN SERPL-MCNC: 31 MG/DL (ref 6–20)
BUN SERPL-MCNC: 32 MG/DL (ref 6–20)
BUN/CREAT SERPL: 7.3 (ref 7–25)
BUN/CREAT SERPL: 7.7 (ref 7–25)
BUN/CREAT SERPL: 9 (ref 7–25)
BUPRENORPHINE SERPL-MCNC: NEGATIVE NG/ML
CALCIUM SPEC-SCNC: 7 MG/DL (ref 8.6–10.5)
CALCIUM SPEC-SCNC: 7.3 MG/DL (ref 8.6–10.5)
CALCIUM SPEC-SCNC: 8.5 MG/DL (ref 8.6–10.5)
CANNABINOIDS SERPL QL: NEGATIVE
CHLORIDE SERPL-SCNC: 101 MMOL/L (ref 98–107)
CHLORIDE SERPL-SCNC: 105 MMOL/L (ref 98–107)
CHLORIDE SERPL-SCNC: 109 MMOL/L (ref 98–107)
CK SERPL-CCNC: 35 U/L (ref 20–200)
CLARITY UR: CLEAR
CO2 SERPL-SCNC: 19 MMOL/L (ref 22–29)
CO2 SERPL-SCNC: 20 MMOL/L (ref 22–29)
CO2 SERPL-SCNC: 20 MMOL/L (ref 22–29)
COCAINE UR QL: NEGATIVE
COLOR UR: YELLOW
CREAT SERPL-MCNC: 3.33 MG/DL (ref 0.76–1.27)
CREAT SERPL-MCNC: 4.03 MG/DL (ref 0.76–1.27)
CREAT SERPL-MCNC: 4.4 MG/DL (ref 0.76–1.27)
D-LACTATE SERPL-SCNC: 1.1 MMOL/L (ref 0.5–2)
D-LACTATE SERPL-SCNC: 1.2 MMOL/L (ref 0.5–2)
DEPRECATED RDW RBC AUTO: 45.6 FL (ref 37–54)
DEPRECATED RDW RBC AUTO: 47.9 FL (ref 37–54)
EOSINOPHIL # BLD AUTO: 0.01 10*3/MM3 (ref 0–0.4)
EOSINOPHIL # BLD AUTO: 0.01 10*3/MM3 (ref 0–0.4)
EOSINOPHIL NFR BLD AUTO: 0 % (ref 0.3–6.2)
EOSINOPHIL NFR BLD AUTO: 0 % (ref 0.3–6.2)
ERYTHROCYTE [DISTWIDTH] IN BLOOD BY AUTOMATED COUNT: 14.8 % (ref 12.3–15.4)
ERYTHROCYTE [DISTWIDTH] IN BLOOD BY AUTOMATED COUNT: 14.9 % (ref 12.3–15.4)
ETHANOL UR QL: <0.01 %
GFR SERPL CREATININE-BSD FRML MDRD: 14 ML/MIN/1.73
GFR SERPL CREATININE-BSD FRML MDRD: 15 ML/MIN/1.73
GFR SERPL CREATININE-BSD FRML MDRD: 19 ML/MIN/1.73
GFR SERPL CREATININE-BSD FRML MDRD: ABNORMAL ML/MIN/{1.73_M2}
GLOBULIN UR ELPH-MCNC: 2.6 GM/DL
GLOBULIN UR ELPH-MCNC: 3.2 GM/DL
GLUCOSE SERPL-MCNC: 113 MG/DL (ref 65–99)
GLUCOSE SERPL-MCNC: 119 MG/DL (ref 65–99)
GLUCOSE SERPL-MCNC: 127 MG/DL (ref 65–99)
GLUCOSE UR STRIP-MCNC: NEGATIVE MG/DL
HCT VFR BLD AUTO: 36.1 % (ref 37.5–51)
HCT VFR BLD AUTO: 40.5 % (ref 37.5–51)
HGB BLD-MCNC: 11.8 G/DL (ref 13–17.7)
HGB BLD-MCNC: 13.5 G/DL (ref 13–17.7)
HGB UR QL STRIP.AUTO: ABNORMAL
HYALINE CASTS UR QL AUTO: ABNORMAL /LPF
IMM GRANULOCYTES # BLD AUTO: 0.16 10*3/MM3 (ref 0–0.05)
IMM GRANULOCYTES # BLD AUTO: 0.21 10*3/MM3 (ref 0–0.05)
IMM GRANULOCYTES NFR BLD AUTO: 0.6 % (ref 0–0.5)
IMM GRANULOCYTES NFR BLD AUTO: 0.7 % (ref 0–0.5)
KETONES UR QL STRIP: NEGATIVE
LEUKOCYTE ESTERASE UR QL STRIP.AUTO: NEGATIVE
LIPASE SERPL-CCNC: 44 U/L (ref 13–60)
LYMPHOCYTES # BLD AUTO: 0.95 10*3/MM3 (ref 0.7–3.1)
LYMPHOCYTES # BLD AUTO: 1.81 10*3/MM3 (ref 0.7–3.1)
LYMPHOCYTES NFR BLD AUTO: 3.5 % (ref 19.6–45.3)
LYMPHOCYTES NFR BLD AUTO: 6.4 % (ref 19.6–45.3)
MCH RBC QN AUTO: 28.3 PG (ref 26.6–33)
MCH RBC QN AUTO: 28.6 PG (ref 26.6–33)
MCHC RBC AUTO-ENTMCNC: 32.7 G/DL (ref 31.5–35.7)
MCHC RBC AUTO-ENTMCNC: 33.3 G/DL (ref 31.5–35.7)
MCV RBC AUTO: 84.9 FL (ref 79–97)
MCV RBC AUTO: 87.6 FL (ref 79–97)
METHADONE UR QL SCN: NEGATIVE
MONOCYTES # BLD AUTO: 0.86 10*3/MM3 (ref 0.1–0.9)
MONOCYTES # BLD AUTO: 0.91 10*3/MM3 (ref 0.1–0.9)
MONOCYTES NFR BLD AUTO: 3.1 % (ref 5–12)
MONOCYTES NFR BLD AUTO: 3.3 % (ref 5–12)
MRSA DNA SPEC QL NAA+PROBE: NORMAL
NEUTROPHILS NFR BLD AUTO: 25.14 10*3/MM3 (ref 1.7–7)
NEUTROPHILS NFR BLD AUTO: 25.15 10*3/MM3 (ref 1.7–7)
NEUTROPHILS NFR BLD AUTO: 89.6 % (ref 42.7–76)
NEUTROPHILS NFR BLD AUTO: 92.3 % (ref 42.7–76)
NITRITE UR QL STRIP: NEGATIVE
NRBC BLD AUTO-RTO: 0 /100 WBC (ref 0–0.2)
NRBC BLD AUTO-RTO: 0 /100 WBC (ref 0–0.2)
OPIATES UR QL: NEGATIVE
OXYCODONE UR QL SCN: NEGATIVE
PCP UR QL SCN: NEGATIVE
PH UR STRIP.AUTO: <=5 [PH] (ref 5–8)
PLATELET # BLD AUTO: 183 10*3/MM3 (ref 140–450)
PLATELET # BLD AUTO: 219 10*3/MM3 (ref 140–450)
PMV BLD AUTO: 10.1 FL (ref 6–12)
PMV BLD AUTO: 9.8 FL (ref 6–12)
POTASSIUM SERPL-SCNC: 4 MMOL/L (ref 3.5–5.2)
POTASSIUM SERPL-SCNC: 4.1 MMOL/L (ref 3.5–5.2)
POTASSIUM SERPL-SCNC: 4.2 MMOL/L (ref 3.5–5.2)
PROPOXYPH UR QL: NEGATIVE
PROT SERPL-MCNC: 6 G/DL (ref 6–8.5)
PROT SERPL-MCNC: 7.3 G/DL (ref 6–8.5)
PROT UR QL STRIP: ABNORMAL
RBC # BLD AUTO: 4.12 10*6/MM3 (ref 4.14–5.8)
RBC # BLD AUTO: 4.77 10*6/MM3 (ref 4.14–5.8)
RBC # UR: ABNORMAL /HPF
REF LAB TEST METHOD: ABNORMAL
SARS-COV-2 RDRP RESP QL NAA+PROBE: NOT DETECTED
SODIUM SERPL-SCNC: 140 MMOL/L (ref 136–145)
SODIUM SERPL-SCNC: 140 MMOL/L (ref 136–145)
SODIUM SERPL-SCNC: 141 MMOL/L (ref 136–145)
SP GR UR STRIP: 1.01 (ref 1–1.03)
SQUAMOUS #/AREA URNS HPF: ABNORMAL /HPF
TRICYCLICS UR QL SCN: NEGATIVE
TROPONIN T SERPL-MCNC: 0.01 NG/ML (ref 0–0.03)
UROBILINOGEN UR QL STRIP: ABNORMAL
WBC # BLD AUTO: 27.25 10*3/MM3 (ref 3.4–10.8)
WBC # BLD AUTO: 28.1 10*3/MM3 (ref 3.4–10.8)
WBC UR QL AUTO: ABNORMAL /HPF

## 2020-08-12 PROCEDURE — 73700 CT LOWER EXTREMITY W/O DYE: CPT

## 2020-08-12 PROCEDURE — 84484 ASSAY OF TROPONIN QUANT: CPT | Performed by: EMERGENCY MEDICINE

## 2020-08-12 PROCEDURE — 87077 CULTURE AEROBIC IDENTIFY: CPT | Performed by: INTERNAL MEDICINE

## 2020-08-12 PROCEDURE — 82550 ASSAY OF CK (CPK): CPT | Performed by: EMERGENCY MEDICINE

## 2020-08-12 PROCEDURE — 74176 CT ABD & PELVIS W/O CONTRAST: CPT

## 2020-08-12 PROCEDURE — 80307 DRUG TEST PRSMV CHEM ANLYZR: CPT | Performed by: EMERGENCY MEDICINE

## 2020-08-12 PROCEDURE — 25010000002 VANCOMYCIN 10 G RECONSTITUTED SOLUTION: Performed by: EMERGENCY MEDICINE

## 2020-08-12 PROCEDURE — 83605 ASSAY OF LACTIC ACID: CPT | Performed by: EMERGENCY MEDICINE

## 2020-08-12 PROCEDURE — 80053 COMPREHEN METABOLIC PANEL: CPT | Performed by: INTERNAL MEDICINE

## 2020-08-12 PROCEDURE — 71045 X-RAY EXAM CHEST 1 VIEW: CPT

## 2020-08-12 PROCEDURE — 83605 ASSAY OF LACTIC ACID: CPT | Performed by: INTERNAL MEDICINE

## 2020-08-12 PROCEDURE — 87641 MR-STAPH DNA AMP PROBE: CPT | Performed by: INTERNAL MEDICINE

## 2020-08-12 PROCEDURE — 73502 X-RAY EXAM HIP UNI 2-3 VIEWS: CPT

## 2020-08-12 PROCEDURE — 80053 COMPREHEN METABOLIC PANEL: CPT | Performed by: EMERGENCY MEDICINE

## 2020-08-12 PROCEDURE — 94799 UNLISTED PULMONARY SVC/PX: CPT

## 2020-08-12 PROCEDURE — 87635 SARS-COV-2 COVID-19 AMP PRB: CPT | Performed by: EMERGENCY MEDICINE

## 2020-08-12 PROCEDURE — 85025 COMPLETE CBC W/AUTO DIFF WBC: CPT | Performed by: EMERGENCY MEDICINE

## 2020-08-12 PROCEDURE — 81001 URINALYSIS AUTO W/SCOPE: CPT | Performed by: EMERGENCY MEDICINE

## 2020-08-12 PROCEDURE — 94640 AIRWAY INHALATION TREATMENT: CPT

## 2020-08-12 PROCEDURE — 85025 COMPLETE CBC W/AUTO DIFF WBC: CPT | Performed by: INTERNAL MEDICINE

## 2020-08-12 PROCEDURE — 87186 SC STD MICRODIL/AGAR DIL: CPT | Performed by: INTERNAL MEDICINE

## 2020-08-12 PROCEDURE — 87040 BLOOD CULTURE FOR BACTERIA: CPT | Performed by: EMERGENCY MEDICINE

## 2020-08-12 PROCEDURE — 36415 COLL VENOUS BLD VENIPUNCTURE: CPT

## 2020-08-12 PROCEDURE — 25010000002 CEFEPIME PER 500 MG: Performed by: INTERNAL MEDICINE

## 2020-08-12 PROCEDURE — 87070 CULTURE OTHR SPECIMN AEROBIC: CPT | Performed by: INTERNAL MEDICINE

## 2020-08-12 PROCEDURE — 25010000002 ONDANSETRON PER 1 MG: Performed by: EMERGENCY MEDICINE

## 2020-08-12 PROCEDURE — 82140 ASSAY OF AMMONIA: CPT | Performed by: EMERGENCY MEDICINE

## 2020-08-12 PROCEDURE — 83690 ASSAY OF LIPASE: CPT | Performed by: EMERGENCY MEDICINE

## 2020-08-12 PROCEDURE — 87205 SMEAR GRAM STAIN: CPT | Performed by: INTERNAL MEDICINE

## 2020-08-12 PROCEDURE — 80306 DRUG TEST PRSMV INSTRMNT: CPT | Performed by: EMERGENCY MEDICINE

## 2020-08-12 PROCEDURE — 25010000002 PIPERACILLIN SOD-TAZOBACTAM PER 1 G: Performed by: EMERGENCY MEDICINE

## 2020-08-12 RX ORDER — IPRATROPIUM BROMIDE AND ALBUTEROL SULFATE 2.5; .5 MG/3ML; MG/3ML
3 SOLUTION RESPIRATORY (INHALATION)
Status: DISCONTINUED | OUTPATIENT
Start: 2020-08-12 | End: 2020-08-16 | Stop reason: HOSPADM

## 2020-08-12 RX ORDER — ONDANSETRON 4 MG/1
4 TABLET, FILM COATED ORAL EVERY 8 HOURS PRN
Status: DISCONTINUED | OUTPATIENT
Start: 2020-08-12 | End: 2020-08-16 | Stop reason: HOSPADM

## 2020-08-12 RX ORDER — SODIUM CHLORIDE 9 MG/ML
100 INJECTION, SOLUTION INTRAVENOUS CONTINUOUS
Status: DISCONTINUED | OUTPATIENT
Start: 2020-08-12 | End: 2020-08-14

## 2020-08-12 RX ORDER — DOCUSATE SODIUM 100 MG/1
100 CAPSULE, LIQUID FILLED ORAL 2 TIMES DAILY PRN
Status: DISCONTINUED | OUTPATIENT
Start: 2020-08-12 | End: 2020-08-16 | Stop reason: HOSPADM

## 2020-08-12 RX ORDER — ASPIRIN 81 MG/1
81 TABLET ORAL DAILY
Status: ON HOLD | COMMUNITY
End: 2021-01-01

## 2020-08-12 RX ORDER — DIPHENHYDRAMINE HCL 25 MG
50 CAPSULE ORAL EVERY 6 HOURS PRN
Status: DISCONTINUED | OUTPATIENT
Start: 2020-08-12 | End: 2020-08-16 | Stop reason: HOSPADM

## 2020-08-12 RX ORDER — ONDANSETRON HCL IN 0.9 % NACL 8 MG/50 ML
8 INTRAVENOUS SOLUTION, PIGGYBACK (ML) INTRAVENOUS ONCE
Status: COMPLETED | OUTPATIENT
Start: 2020-08-12 | End: 2020-08-12

## 2020-08-12 RX ORDER — ATORVASTATIN CALCIUM 10 MG/1
10 TABLET, FILM COATED ORAL NIGHTLY
Status: DISCONTINUED | OUTPATIENT
Start: 2020-08-12 | End: 2020-08-16 | Stop reason: HOSPADM

## 2020-08-12 RX ORDER — FUROSEMIDE 40 MG/1
40 TABLET ORAL DAILY
Status: ON HOLD | COMMUNITY
End: 2020-08-16 | Stop reason: SDUPTHER

## 2020-08-12 RX ORDER — MIDODRINE HYDROCHLORIDE 2.5 MG/1
5 TABLET ORAL
Status: DISCONTINUED | OUTPATIENT
Start: 2020-08-12 | End: 2020-08-12

## 2020-08-12 RX ORDER — ALBUTEROL SULFATE 90 UG/1
2 AEROSOL, METERED RESPIRATORY (INHALATION) EVERY 4 HOURS PRN
Status: ON HOLD | COMMUNITY
End: 2021-01-01

## 2020-08-12 RX ORDER — SODIUM CHLORIDE 0.9 % (FLUSH) 0.9 %
10 SYRINGE (ML) INJECTION EVERY 12 HOURS SCHEDULED
Status: DISCONTINUED | OUTPATIENT
Start: 2020-08-12 | End: 2020-08-16 | Stop reason: HOSPADM

## 2020-08-12 RX ORDER — PANTOPRAZOLE SODIUM 40 MG/1
40 TABLET, DELAYED RELEASE ORAL DAILY
Status: DISCONTINUED | OUTPATIENT
Start: 2020-08-12 | End: 2020-08-16 | Stop reason: HOSPADM

## 2020-08-12 RX ORDER — MIDODRINE HYDROCHLORIDE 2.5 MG/1
5 TABLET ORAL ONCE
Status: COMPLETED | OUTPATIENT
Start: 2020-08-12 | End: 2020-08-12

## 2020-08-12 RX ORDER — SUCRALFATE 1 G/1
1 TABLET ORAL
Status: ON HOLD | COMMUNITY
End: 2021-01-01

## 2020-08-12 RX ORDER — CLONAZEPAM 0.5 MG/1
0.5 TABLET ORAL 2 TIMES DAILY
Status: DISCONTINUED | OUTPATIENT
Start: 2020-08-12 | End: 2020-08-16 | Stop reason: HOSPADM

## 2020-08-12 RX ORDER — ACETAMINOPHEN 325 MG/1
650 TABLET ORAL EVERY 6 HOURS PRN
Status: DISCONTINUED | OUTPATIENT
Start: 2020-08-12 | End: 2020-08-16 | Stop reason: HOSPADM

## 2020-08-12 RX ORDER — GUAIFENESIN 600 MG/1
1200 TABLET, EXTENDED RELEASE ORAL EVERY 12 HOURS SCHEDULED
Status: DISCONTINUED | OUTPATIENT
Start: 2020-08-12 | End: 2020-08-16 | Stop reason: HOSPADM

## 2020-08-12 RX ORDER — BENZONATATE 100 MG/1
200 CAPSULE ORAL 3 TIMES DAILY PRN
Status: ON HOLD | COMMUNITY
End: 2021-01-01

## 2020-08-12 RX ORDER — LACTOBACILLUS ACIDOPHILUS 500MM CELL
1 CAPSULE ORAL DAILY
Status: ON HOLD | COMMUNITY
End: 2021-01-01

## 2020-08-12 RX ORDER — ASPIRIN 81 MG/1
81 TABLET, CHEWABLE ORAL DAILY
Status: DISCONTINUED | OUTPATIENT
Start: 2020-08-12 | End: 2020-08-16 | Stop reason: HOSPADM

## 2020-08-12 RX ORDER — CHOLESTYRAMINE LIGHT 4 G/5.7G
4 POWDER, FOR SUSPENSION ORAL 2 TIMES DAILY
Status: ON HOLD | COMMUNITY
End: 2021-01-01

## 2020-08-12 RX ORDER — FERROUS SULFATE 325(65) MG
325 TABLET ORAL
Status: ON HOLD | COMMUNITY
End: 2021-01-01

## 2020-08-12 RX ORDER — SODIUM CHLORIDE 0.9 % (FLUSH) 0.9 %
10 SYRINGE (ML) INJECTION AS NEEDED
Status: DISCONTINUED | OUTPATIENT
Start: 2020-08-12 | End: 2020-08-16 | Stop reason: HOSPADM

## 2020-08-12 RX ADMIN — IPRATROPIUM BROMIDE AND ALBUTEROL SULFATE 3 ML: 2.5; .5 SOLUTION RESPIRATORY (INHALATION) at 19:48

## 2020-08-12 RX ADMIN — SODIUM CHLORIDE 1000 ML: 9 INJECTION, SOLUTION INTRAVENOUS at 08:53

## 2020-08-12 RX ADMIN — VANCOMYCIN HYDROCHLORIDE 1250 MG: 10 INJECTION, POWDER, LYOPHILIZED, FOR SOLUTION INTRAVENOUS at 05:28

## 2020-08-12 RX ADMIN — SODIUM CHLORIDE 100 ML/HR: 9 INJECTION, SOLUTION INTRAVENOUS at 19:26

## 2020-08-12 RX ADMIN — GUAIFENESIN 1200 MG: 600 TABLET, EXTENDED RELEASE ORAL at 12:30

## 2020-08-12 RX ADMIN — SODIUM CHLORIDE 1905 ML: 9 INJECTION, SOLUTION INTRAVENOUS at 02:56

## 2020-08-12 RX ADMIN — PANTOPRAZOLE SODIUM 40 MG: 40 TABLET, DELAYED RELEASE ORAL at 12:29

## 2020-08-12 RX ADMIN — IPRATROPIUM BROMIDE AND ALBUTEROL SULFATE 3 ML: 2.5; .5 SOLUTION RESPIRATORY (INHALATION) at 07:46

## 2020-08-12 RX ADMIN — ONDANSETRON 8 MG: 2 SOLUTION INTRAMUSCULAR; INTRAVENOUS at 03:01

## 2020-08-12 RX ADMIN — IPRATROPIUM BROMIDE AND ALBUTEROL SULFATE 3 ML: 2.5; .5 SOLUTION RESPIRATORY (INHALATION) at 14:20

## 2020-08-12 RX ADMIN — CEFEPIME HYDROCHLORIDE 2 G: 2 INJECTION, POWDER, FOR SOLUTION INTRAVENOUS at 12:28

## 2020-08-12 RX ADMIN — SODIUM CHLORIDE, PRESERVATIVE FREE 10 ML: 5 INJECTION INTRAVENOUS at 20:16

## 2020-08-12 RX ADMIN — SODIUM CHLORIDE 100 ML/HR: 9 INJECTION, SOLUTION INTRAVENOUS at 06:07

## 2020-08-12 RX ADMIN — CLONAZEPAM 0.5 MG: 0.5 TABLET ORAL at 20:16

## 2020-08-12 RX ADMIN — SERTRALINE 150 MG: 100 TABLET, FILM COATED ORAL at 20:16

## 2020-08-12 RX ADMIN — ASPIRIN 81 MG 81 MG: 81 TABLET ORAL at 12:29

## 2020-08-12 RX ADMIN — IPRATROPIUM BROMIDE AND ALBUTEROL SULFATE 3 ML: 2.5; .5 SOLUTION RESPIRATORY (INHALATION) at 10:48

## 2020-08-12 RX ADMIN — CEFEPIME HYDROCHLORIDE 2 G: 2 INJECTION, POWDER, FOR SOLUTION INTRAVENOUS at 20:15

## 2020-08-12 RX ADMIN — MIDODRINE HYDROCHLORIDE 5 MG: 2.5 TABLET ORAL at 03:05

## 2020-08-12 RX ADMIN — PIPERACILLIN AND TAZOBACTAM 3.38 G: 3; .375 INJECTION, POWDER, LYOPHILIZED, FOR SOLUTION INTRAVENOUS; PARENTERAL at 04:06

## 2020-08-12 RX ADMIN — ACETAMINOPHEN 650 MG: 325 TABLET, FILM COATED ORAL at 20:21

## 2020-08-12 RX ADMIN — GUAIFENESIN 1200 MG: 600 TABLET, EXTENDED RELEASE ORAL at 21:27

## 2020-08-12 RX ADMIN — ACETAMINOPHEN 650 MG: 325 TABLET, FILM COATED ORAL at 04:57

## 2020-08-12 RX ADMIN — CLONAZEPAM 0.5 MG: 0.5 TABLET ORAL at 12:29

## 2020-08-12 RX ADMIN — ATORVASTATIN CALCIUM 10 MG: 10 TABLET, FILM COATED ORAL at 20:16

## 2020-08-12 NOTE — PAYOR COMM NOTE
"Katy Rea (60 y.o. Male) [id number]    ADMIT 8/11    [facility] phone   Fax        Date of Birth Social Security Number Address Home Phone MRN    1960  [place] [address] [phone] [id number]    Tenriism Marital Status          [facility] Single       Admission Date Admission Type Admitting Provider Attending Provider Department, Room/Bed    8/11/20 Emergency Pravin Michelle DO Demeo, Michael F, DO [facility] 4B, 445/1    Discharge Date Discharge Disposition Discharge Destination                       Attending Provider:  Pravin Michelle DO    Allergies:  Codeine    Isolation:  None   Infection:  None   Code Status:  CPR    Ht:  170.2 cm (67\")   Wt:  64.5 kg (142 lb 3.2 oz)    Admission Cmt:  None   Principal Problem:  Sepsis, unspecified organism (CMS/[facility]) [A41.9]                 Active Insurance as of 8/11/2020     Primary Coverage     Payor Plan Insurance Group Employer/Plan Group    ANTHEM MEDICAID ANTHEM MEDICAID [id number]     Payor Plan Address Payor Plan Phone Number Payor Plan Fax Number Effective Dates    [address] [address] [phone]  4/2/2020 - None Entered    [facility] [id number]       Subscriber Name Subscriber Birth Date Member ID       KATY REA 1960 [id number]                 Emergency Contacts      (Rel.) Home Phone Work Phone Mobile Phone    Tea Lux (Daughter) [phone] -- --               History & Physical      Hill Álvarez DO at 08/12/20 0321              [facility] Medicine Services  HISTORY AND PHYSICAL    Date of Admission: 8/11/2020  Primary Care Physician: Reyes Soto MD    Subjective     Chief Complaint: Lethargy    History of Present Illness  60-year-old male who was recently discharged from [facility] on Wednesday for pneumonia.  He has been on outpatient Omnicef.  He was sent to his " daughter's home on Friday from [facility].  He was very lethargic today.  He has had decreased urinary output.  He has had a nonproductive cough.  He has not felt well all day.  His daughter took his blood pressure at home and it was in the 70 systolic, and she brought him in due to concern.  The patient states that he just has not been feeling well.  No bowel movement today, he did eat a sandwich earlier, but that was all.  Right hip pain with recent repair, patient states that he is unable to lie on his hip.  He has chest pressure.        Review of Systems   Right hip pain  Nonproductive cough  Chest pressure  Lethargy due to hypotension    Otherwise complete ROS reviewed and negative except as mentioned in the HPI.    Past Medical History:   Past Medical History:   Diagnosis Date   • Anxiety    • COPD (chronic obstructive pulmonary disease) (CMS/HCC)    • Depression    • Encephalopathy    • GERD (gastroesophageal reflux disease)    • Hepatitis C    • Hyperlipidemia    • Hypertension    • Muscle weakness    • Stroke (CMS/HCC)      Past Surgical History:  Past Surgical History:   Procedure Laterality Date   • CERVICAL SPINE SURGERY     • CHOLECYSTECTOMY     • COLONOSCOPY     • ENDOSCOPY     • FEMUR SURGERY     • REPLACEMENT TOTAL KNEE BILATERAL       Social History:  reports that he has been smoking. He has never used smokeless tobacco. He reports that he drinks alcohol. He reports that he does not use drugs.    Family History: CAD    Allergies:  Allergies   Allergen Reactions   • Codeine Rash     Medications:  Prior to Admission medications    Medication Sig Start Date End Date Taking? Authorizing Provider   acetaminophen (TYLENOL) 325 MG tablet Take 650 mg by mouth Every 6 (Six) Hours As Needed for Mild Pain  or Fever.    Provider, Antony, MD   aspirin 81 MG chewable tablet Chew 81 mg Daily.    Provider, MD Antony   butalbital-acetaminophen-caffeine (FIORICET, ESGIC) -40 MG per tablet Take 1  tablet by mouth Every 4 (Four) Hours As Needed for Headache. 6/4/19   Nick Carver MD   clonazePAM (KlonoPIN) 0.5 MG tablet Take 0.5 mg by mouth 2 (Two) Times a Day.    Antony De Oliveira MD   diphenhydrAMINE HCl (BANOPHEN PO) Take 50 mg by mouth 4 (Four) Times a Day As Needed.    Antony De Oliveira MD   docusate sodium (COLACE) 100 MG capsule Take 100 mg by mouth 2 (Two) Times a Day.    Antony De Oliveira MD   folic acid (FOLVITE) 1 MG tablet Take 1 mg by mouth Daily.    Antony De Oliveira MD   gabapentin (NEURONTIN) 300 MG capsule Take 300 mg by mouth Every Night.    Antony De Oliveira MD   hydrALAZINE (APRESOLINE) 10 MG tablet Take 10 mg by mouth 3 (Three) Times a Day As Needed (SBP greater than 160).    Antony De Oliveira MD   Lactobacillus Acidophilus powder Take  by mouth Every Night.    Antony De Oliveira MD   lisinopril (PRINIVIL,ZESTRIL) 20 MG tablet Take 20 mg by mouth Daily.    Antony De Oliveira MD   magnesium hydroxide (MILK OF MAGNESIA) 400 MG/5ML suspension Take 30 mL by mouth Daily As Needed for Constipation.    Antony De Oliveira MD   meloxicam (MOBIC) 15 MG tablet Take 15 mg by mouth Daily.    Antony De Oliveira MD   metoprolol succinate XL (TOPROL-XL) 50 MG 24 hr tablet Take 50 mg by mouth Daily.    Antony De Oliveira MD   midodrine (PROAMATINE) 5 MG tablet Take 5 mg by mouth 3 (Three) Times a Day.    Antony De Oliveira MD   ondansetron (ZOFRAN) 4 MG tablet Take 4 mg by mouth Every 8 (Eight) Hours As Needed for Nausea or Vomiting.    Antony De Oliveira MD   pantoprazole (PROTONIX) 40 MG EC tablet Take 40 mg by mouth Daily.    Antony De Oliveira MD   sertraline (ZOLOFT) 100 MG tablet Take 150 mg by mouth Every Night.    Antony De Oliveira MD   simvastatin (ZOCOR) 10 MG tablet Take 10 mg by mouth Every Night.    Antony De Oliveira MD   thiamine (VITAMIN B-1) 100 MG tablet Take 100 mg by mouth Daily.    Antony De Oliveira MD   traMADol  "(ULTRAM) 50 MG tablet Take 100 mg by mouth Every 8 (Eight) Hours As Needed for Moderate Pain .    Provider, MD Antony   zolpidem (AMBIEN) 5 MG tablet Take 5 mg by mouth Every Night.    Provider, MD Antony     Objective     Vital Signs: BP (!) 78/56   Pulse 111   Temp 97.6 °F (36.4 °C) (Oral)   Resp 12   Ht 170.2 cm (67\")   Wt 63.5 kg (140 lb)   SpO2 94%   BMI 21.93 kg/m²    Physical Exam   Constitutional:   Thin and frail, eyes sunken.   HENT:   Head: Normocephalic and atraumatic.   Nose: Nose normal.   Mouth/Throat: Oropharynx is clear and moist.   Eyes: Conjunctivae and EOM are normal.   Neck: Normal range of motion. Neck supple.   Cardiovascular: Normal rate, regular rhythm and normal heart sounds.   Pulmonary/Chest: Effort normal. He has rales.   Abdominal: Soft. Bowel sounds are normal.   Musculoskeletal: He exhibits tenderness. He exhibits no edema.   Right lateral hip pain with palpable hard area at incision level.   Neurological: He is alert. No cranial nerve deficit.   Skin: Skin is warm and dry.   Psychiatric: He has a normal mood and affect.   Vitals reviewed.           Results Reviewed:  Lab Results (last 24 hours)     Procedure Component Value Units Date/Time    COVID PRE-OP / PRE-PROCEDURE SCREENING ORDER (NO ISOLATION) - Swab, Nasopharynx [[id number]] Collected:  08/12/20 0246    Specimen:  Swab from Nasopharynx Updated:  08/12/20 0311    Narrative:       The following orders were created for panel order COVID PRE-OP / PRE-PROCEDURE SCREENING ORDER (NO ISOLATION) - Swab, Nasopharynx.  Procedure                               Abnormality         Status                     ---------                               -----------         ------                     COVID-Janny ABBOTT IN-HOUS...[[id number]]  Normal              Final result                 Please view results for these tests on the individual orders.    COVID-19, ABBOTT IN-HOUSE,NP Swab (NO TRANSPORT MEDIA) 2 HR TAT - Swab, " Nasopharynx [[id number]]  (Normal) Collected:  08/12/20 0246    Specimen:  Swab from Nasopharynx Updated:  08/12/20 0311     COVID19 Not Detected    Narrative:       Fact sheet for providers: https://www.fda.gov/media/775039/download     Fact sheet for patients: https://www.fda.gov/media/468927/download    Blood Culture - Blood, Arm, Right [[id number]] Collected:  08/12/20 0235    Specimen:  Blood from Arm, Right Updated:  08/12/20 0250    Blood Culture - Blood, Arm, Left [[id number]] Collected:  08/12/20 0241    Specimen:  Blood from Arm, Left Updated:  08/12/20 0249    Comprehensive Metabolic Panel [[id number]]  (Abnormal) Collected:  08/12/20 0114    Specimen:  Blood Updated:  08/12/20 0139     Glucose 127 mg/dL      BUN 32 mg/dL      Creatinine 4.40 mg/dL      Sodium 140 mmol/L      Potassium 4.2 mmol/L      Chloride 101 mmol/L      CO2 20.0 mmol/L      Calcium 8.5 mg/dL      Total Protein 7.3 g/dL      Albumin 4.10 g/dL      ALT (SGPT) 48 U/L      AST (SGOT) 38 U/L      Alkaline Phosphatase 120 U/L      Total Bilirubin 0.5 mg/dL      eGFR Non African Amer 14 mL/min/1.73      Comment: <15 Indicative of kidney failure.        eGFR   Amer --     Comment: <15 Indicative of kidney failure.        Globulin 3.2 gm/dL      A/G Ratio 1.3 g/dL      BUN/Creatinine Ratio 7.3     Anion Gap 19.0 mmol/L     Narrative:       GFR Normal >60  Chronic Kidney Disease <60  Kidney Failure <15      CK [[id number]]  (Normal) Collected:  08/12/20 0114    Specimen:  Blood Updated:  08/12/20 0138     Creatine Kinase 35 U/L     Ammonia [[id number]]  (Normal) Collected:  08/12/20 0114    Specimen:  Blood Updated:  08/12/20 0138     Ammonia 33 umol/L     Troponin [[id number]]  (Normal) Collected:  08/12/20 0114    Specimen:  Blood Updated:  08/12/20 0137     Troponin T 0.010 ng/mL     Narrative:       Troponin T Reference Range:  <= 0.03 ng/mL-   Negative for AMI  >0.03 ng/mL-     Abnormal for myocardial necrosis.  Clinicians would have to  utilize clinical acumen, EKG, Troponin and serial changes to determine if it is an Acute Myocardial Infarction or myocardial injury due to an underlying chronic condition.       Results may be falsely decreased if patient taking Biotin.      Lipase [[id number]]  (Normal) Collected:  08/12/20 0114    Specimen:  Blood Updated:  08/12/20 0134     Lipase 44 U/L     Ethanol [[id number]] Collected:  08/12/20 0114    Specimen:  Blood Updated:  08/12/20 0134     Ethanol % <0.010 %     Narrative:       Not for legal purposes. Chain of Custody not followed.     Lactic Acid, Plasma [[id number]]  (Normal) Collected:  08/12/20 0114    Specimen:  Blood Updated:  08/12/20 0134     Lactate 1.2 mmol/L     CBC & Differential [[id number]] Collected:  08/12/20 0114    Specimen:  Blood Updated:  08/12/20 0122    Narrative:       The following orders were created for panel order CBC & Differential.  Procedure                               Abnormality         Status                     ---------                               -----------         ------                     CBC Auto Differential[[id number]]        Abnormal            Final result                 Please view results for these tests on the individual orders.    CBC Auto Differential [[id number]]  (Abnormal) Collected:  08/12/20 0114    Specimen:  Blood Updated:  08/12/20 0122     WBC 27.25 10*3/mm3      RBC 4.77 10*6/mm3      Hemoglobin 13.5 g/dL      Hematocrit 40.5 %      MCV 84.9 fL      MCH 28.3 pg      MCHC 33.3 g/dL      RDW 14.8 %      RDW-SD 45.6 fl      MPV 9.8 fL      Platelets 219 10*3/mm3      Neutrophil % 92.3 %      Lymphocyte % 3.5 %      Monocyte % 3.3 %      Eosinophil % 0.0 %      Basophil % 0.3 %      Immature Grans % 0.6 %      Neutrophils, Absolute 25.14 10*3/mm3      Lymphocytes, Absolute 0.95 10*3/mm3      Monocytes, Absolute 0.91 10*3/mm3      Eosinophils, Absolute 0.01 10*3/mm3      Basophils, Absolute 0.08 10*3/mm3      Immature Grans, Absolute 0.16  "10*3/mm3      nRBC 0.0 /100 WBC         Imaging Results (Last 24 Hours)     Procedure Component Value Units Date/Time    CT Abdomen Pelvis Without Contrast [[id number]] Resulted:  08/12/20 0208     Updated:  08/12/20 0213    XR Chest 1 View [[id number]] Resulted:  08/12/20 0123     Updated:  08/12/20 0124        I have personally reviewed and interpreted the radiology studies and ECG obtained at time of admission.     Assessment / Plan     Assessment:   Active Hospital Problems    Diagnosis   • Acute renal failure (CMS/[facility])     Impression:  1.  Acute renal failure   2.  Metabolic acidosis with anion gap of 19  3.  Bilateral pneumonia  4.  Leukocytosis with shift  5.  Hypotension/sepsis  6.  History of COPD    Plan:   1.  IV fluids  2.  Follow blood pressure closely  3.  Vancomycin and Zosyn  4.  Hold blood pressure medications  5.  Hold NSAIDs  6.  Labs in the morning  7.  Right hip x-ray secondary to pain  8.  Mucinex and duo nebs with Acapella  9.  Respiratory culture  10.  PT OT consult    Code Status: Full     I discussed the patient's findings and my recommendations with the patient and sister at bedside    Estimated length of stay 3 to 4 days    Patient seen and examined by me on 8/12/2020    Electronically signed by Hill Álvarez DO, 08/12/20, 03:21.                Electronically signed by Hill Álvarez DO at 08/12/20 0327          Emergency Department Notes      Altaf Shipman MD at 08/12/20 0049          Subjective     61 y/o male recently released from NH to daughter arrives for evaluation of somulance and hypotension. The daughter states the patient complained he was tired \"all day\" and kept nodding off. She noted his BP was low and called the NH who advised she give him water and repeat his BP. She did so and found it to be 70s thus presented here for evaluation. He notes only right sided flank pain which he states he has a history of chronic back pain. Of note the DA was given a literal " "bag of medications (blast packs) and is having a difficult time with giving his medications to him stating that he was taking his own by himself today. She denies any falls or trauma but does note one episode of vomiting upon arrival here. Of note the patient was a former alcoholic but has not been drinking in \"years\" per daughter. Further of note the patient is also being treated for PNA.           Family, social and past history reviewed as below, prior documentation of H and Ps and other documentation are reviewed:    Past Medical History:  No date: Anxiety  No date: COPD (chronic obstructive pulmonary disease) (CMS/HCC)  No date: Depression  No date: Encephalopathy  No date: GERD (gastroesophageal reflux disease)  No date: Hepatitis C  No date: Hyperlipidemia  No date: Hypertension  No date: Muscle weakness  No date: Stroke (CMS/HCC)    Past Surgical History:  No date: CERVICAL SPINE SURGERY  No date: CHOLECYSTECTOMY  No date: COLONOSCOPY  No date: ENDOSCOPY  No date: FEMUR SURGERY  No date: REPLACEMENT TOTAL KNEE BILATERAL    Social History    Socioeconomic History      Marital status: Single      Spouse name: Not on file      Number of children: Not on file      Years of education: Not on file      Highest education level: Not on file    Tobacco Use      Smoking status: Current Every Day Smoker      Smokeless tobacco: Never Used    Substance and Sexual Activity      Alcohol use: Yes      Drug use: No      Sexual activity: Defer      Family history: reviewed and noncontributory           Review of Systems   All other systems reviewed and are negative.      Past Medical History:   Diagnosis Date   • Anxiety    • COPD (chronic obstructive pulmonary disease) (CMS/HCC)    • Depression    • Encephalopathy    • GERD (gastroesophageal reflux disease)    • Hepatitis C    • Hyperlipidemia    • Hypertension    • Muscle weakness    • Stroke (CMS/HCC)        Allergies   Allergen Reactions   • Codeine Rash       Past " Surgical History:   Procedure Laterality Date   • CERVICAL SPINE SURGERY     • CHOLECYSTECTOMY     • COLONOSCOPY     • ENDOSCOPY     • FEMUR SURGERY     • REPLACEMENT TOTAL KNEE BILATERAL         History reviewed. No pertinent family history.    Social History     Socioeconomic History   • Marital status: Single     Spouse name: Not on file   • Number of children: Not on file   • Years of education: Not on file   • Highest education level: Not on file   Tobacco Use   • Smoking status: Current Every Day Smoker   • Smokeless tobacco: Never Used   Substance and Sexual Activity   • Alcohol use: Yes   • Drug use: No   • Sexual activity: Defer           Objective   Physical Exam   Constitutional: He is oriented to person, place, and time. He appears well-developed and well-nourished.   HENT:   Head: Normocephalic and atraumatic.   Eyes: Pupils are equal, round, and reactive to light. Conjunctivae and EOM are normal.   Neck: Normal range of motion. Neck supple.   Cardiovascular: Regular rhythm, normal heart sounds and intact distal pulses. Tachycardia present.   Pulmonary/Chest: Effort normal. No stridor. No respiratory distress. He has no wheezes. He has rhonchi. He has no rales. He exhibits no tenderness.   Abdominal: Soft. Bowel sounds are normal. He exhibits no distension and no mass. There is no tenderness. There is CVA tenderness. There is no rebound and no guarding. No hernia.   Musculoskeletal: Normal range of motion.   Neurological: He is alert and oriented to person, place, and time. No cranial nerve deficit or sensory deficit.   Skin: Skin is warm. Capillary refill takes less than 2 seconds.   Psychiatric: He has a normal mood and affect. His behavior is normal.   Vitals reviewed.      Procedures          ED Course            XR Chest 1 View   ED Interpretation   Left lower lung opacity       Final Result   1. COPD no acute cardiopulmonary process.           This report was finalized on 08/12/2020 07:00 by   Kevin Gould MD.      CT Abdomen Pelvis Without Contrast    (Results Pending)   XR Hip With or Without Pelvis 2 - 3 View Right    (Results Pending)   CT Lower Extremity Right Without Contrast    (Results Pending)     Labs Reviewed   COMPREHENSIVE METABOLIC PANEL - Abnormal; Notable for the following components:       Result Value    Glucose 127 (*)     BUN 32 (*)     Creatinine 4.40 (*)     CO2 20.0 (*)     Calcium 8.5 (*)     ALT (SGPT) 48 (*)     Alkaline Phosphatase 120 (*)     eGFR Non  Amer 14 (*)     Anion Gap 19.0 (*)     All other components within normal limits    Narrative:     GFR Normal >60  Chronic Kidney Disease <60  Kidney Failure <15     CBC WITH AUTO DIFFERENTIAL - Abnormal; Notable for the following components:    WBC 27.25 (*)     Neutrophil % 92.3 (*)     Lymphocyte % 3.5 (*)     Monocyte % 3.3 (*)     Eosinophil % 0.0 (*)     Immature Grans % 0.6 (*)     Neutrophils, Absolute 25.14 (*)     Monocytes, Absolute 0.91 (*)     Immature Grans, Absolute 0.16 (*)     All other components within normal limits   CBC WITH AUTO DIFFERENTIAL - Abnormal; Notable for the following components:    WBC 28.10 (*)     RBC 4.12 (*)     Hemoglobin 11.8 (*)     Hematocrit 36.1 (*)     Neutrophil % 89.6 (*)     Lymphocyte % 6.4 (*)     Monocyte % 3.1 (*)     Eosinophil % 0.0 (*)     Immature Grans % 0.7 (*)     Neutrophils, Absolute 25.15 (*)     Immature Grans, Absolute 0.21 (*)     All other components within normal limits   COMPREHENSIVE METABOLIC PANEL - Abnormal; Notable for the following components:    Glucose 113 (*)     BUN 31 (*)     Creatinine 4.03 (*)     CO2 20.0 (*)     Calcium 7.3 (*)     Albumin 3.40 (*)     eGFR Non  Amer 15 (*)     All other components within normal limits    Narrative:     GFR Normal >60  Chronic Kidney Disease <60  Kidney Failure <15     COVID-19,ABBOTT IN-HOUSE,NP SWAB (NO TRANSPORT MEDIA) 2 HR TAT - Normal    Narrative:     Fact sheet for providers:  https://www.fda.gov/media/535462/download     Fact sheet for patients: https://www.fda.gov/media/757261/download   LIPASE - Normal   LACTIC ACID, PLASMA - Normal   TROPONIN (IN-HOUSE) - Normal    Narrative:     Troponin T Reference Range:  <= 0.03 ng/mL-   Negative for AMI  >0.03 ng/mL-     Abnormal for myocardial necrosis.  Clinicians would have to utilize clinical acumen, EKG, Troponin and serial changes to determine if it is an Acute Myocardial Infarction or myocardial injury due to an underlying chronic condition.       Results may be falsely decreased if patient taking Biotin.     CK - Normal   AMMONIA - Normal   COVID PRE-OP / PRE-PROCEDURE SCREENING ORDER (NO ISOLATION)    Narrative:     The following orders were created for panel order COVID PRE-OP / PRE-PROCEDURE SCREENING ORDER (NO ISOLATION) - Swab, Nasopharynx.  Procedure                               Abnormality         Status                     ---------                               -----------         ------                     COVID-19, ABBOTT IN-HOUS...[[id number]]  Normal              Final result                 Please view results for these tests on the individual orders.   BLOOD CULTURE   BLOOD CULTURE   RESPIRATORY CULTURE   MRSA SCREEN, PCR   ETHANOL    Narrative:     Not for legal purposes. Chain of Custody not followed.    URINALYSIS W/ MICROSCOPIC IF INDICATED (NO CULTURE)   URINE DRUG SCREEN   CBC AND DIFFERENTIAL    Narrative:     The following orders were created for panel order CBC & Differential.  Procedure                               Abnormality         Status                     ---------                               -----------         ------                     CBC Auto Differential[[id number]]        Abnormal            Final result                 Please view results for these tests on the individual orders.                                       MDM    Final diagnoses:   Acute renal failure, unspecified acute renal failure  type (CMS/[facility])   Sepsis, due to unspecified organism, unspecified whether acute organ dysfunction present (CMS/[facility])            Altaf Shipman MD  08/12/20 0717      Electronically signed by Altaf Shipman MD at 08/12/20 0717       Vital Signs (last day)     Date/Time   Temp   Temp src   Pulse   Resp   BP   Patient Position   SpO2    08/12/20 0804   98.8 (37.1)   Oral   87   16   (!) 87/49   Lying   92    08/12/20 0746   --   --   89   16   --   --   92    08/12/20 0525   98.9 (37.2)   Oral   99   16   (!) 88/54   Sitting   97    08/12/20 0415   --   --   98   --   (!) 79/56   --   96    08/12/20 03:03:23   --   --   111   12   (!) 78/56   --   94    08/12/20 0303   --   --   111   --   (!) 75/56   --   94    08/11/20 2352   97.6 (36.4)   Oral   102   19   115/75   --   92                Current Facility-Administered Medications   Medication Dose Route Frequency Provider Last Rate Last Dose   • acetaminophen (TYLENOL) tablet 650 mg  650 mg Oral Q6H PRN Hill Álvarez DO   650 mg at 08/12/20 0457   • aspirin chewable tablet 81 mg  81 mg Oral Daily Hill Álvarez DO       • atorvastatin (LIPITOR) tablet 10 mg  10 mg Oral Nightly Hill Álvarez DO       • cefepime (MAXIPIME) 2 g/100 mL 0.9% NS (mbp)  2 g Intravenous Once Pravin Michelle DO       • cefepime (MAXIPIME) 2 g/100 mL 0.9% NS (mbp)  2 g Intravenous Q24H Pravin Michelle DO       • clonazePAM (KlonoPIN) tablet 0.5 mg  0.5 mg Oral BID Hill Álvarez DO       • diphenhydrAMINE (BENADRYL) capsule 50 mg  50 mg Oral Q6H PRN Hill Álvarez DO       • docusate sodium (COLACE) capsule 100 mg  100 mg Oral BID PRN Hill Álvarez DO       • guaiFENesin (MUCINEX) 12 hr tablet 1,200 mg  1,200 mg Oral Q12H Hill Álvarez DO       • ipratropium-albuterol (DUO-NEB) nebulizer solution 3 mL  3 mL Nebulization 4x Daily - RT Hill Álvarez DO   3 mL at 08/12/20 0746   • ondansetron (ZOFRAN) tablet 4 mg  4 mg Oral Q8H PRN  Hill Álvarez DO       • pantoprazole (PROTONIX) EC tablet 40 mg  40 mg Oral Daily Hill Álvarez DO       • Pharmacy Consult - Pharmacy to dose   Does not apply Continuous PRN Pravin Michelle DO       • sertraline (ZOLOFT) tablet 150 mg  150 mg Oral Nightly Hill Álvarez DO       • sodium chloride 0.9 % bolus 1,000 mL  1,000 mL Intravenous Once Pravin Michelle DO 1,000 mL/hr at 08/12/20 0853 1,000 mL at 08/12/20 0853   • sodium chloride 0.9 % flush 10 mL  10 mL Intravenous Q12H Hill Álvarez DO       • sodium chloride 0.9 % flush 10 mL  10 mL Intravenous PRN Hill Álvarez DO       • sodium chloride 0.9 % infusion  100 mL/hr Intravenous Continuous Hill Álvarez  mL/hr at 08/12/20 0607 100 mL/hr at 08/12/20 0607   • [START ON 8/14/2020] vancomycin 750 mg/250 mL 0.9% NS IVPB ([facility])  750 mg Intravenous Q48H Hill Álvarez DO           Lab Results (last 24 hours)     Procedure Component Value Units Date/Time    MRSA Screen, PCR (Inpatient) - Swab, Nares [[id number]] Updated:  08/12/20 0907    Specimen:  Swab from Nares     Comprehensive Metabolic Panel [[id number]]  (Abnormal) Collected:  08/12/20 0554    Specimen:  Blood Updated:  08/12/20 0652     Glucose 113 mg/dL      BUN 31 mg/dL      Creatinine 4.03 mg/dL      Sodium 140 mmol/L      Potassium 4.1 mmol/L      Chloride 105 mmol/L      CO2 20.0 mmol/L      Calcium 7.3 mg/dL      Total Protein 6.0 g/dL      Albumin 3.40 g/dL      ALT (SGPT) 36 U/L      AST (SGOT) 28 U/L      Alkaline Phosphatase 96 U/L      Total Bilirubin 0.4 mg/dL      eGFR Non African Amer 15 mL/min/1.73      Globulin 2.6 gm/dL      A/G Ratio 1.3 g/dL      BUN/Creatinine Ratio 7.7     Anion Gap 15.0 mmol/L     Narrative:       GFR Normal >60  Chronic Kidney Disease <60  Kidney Failure <15      CBC Auto Differential [[id number]]  (Abnormal) Collected:  08/12/20 0554    Specimen:  Blood Updated:  08/12/20 0637     WBC 28.10 10*3/mm3      RBC 4.12 10*6/mm3       Hemoglobin 11.8 g/dL      Hematocrit 36.1 %      MCV 87.6 fL      MCH 28.6 pg      MCHC 32.7 g/dL      RDW 14.9 %      RDW-SD 47.9 fl      MPV 10.1 fL      Platelets 183 10*3/mm3      Neutrophil % 89.6 %      Lymphocyte % 6.4 %      Monocyte % 3.1 %      Eosinophil % 0.0 %      Basophil % 0.2 %      Immature Grans % 0.7 %      Neutrophils, Absolute 25.15 10*3/mm3      Lymphocytes, Absolute 1.81 10*3/mm3      Monocytes, Absolute 0.86 10*3/mm3      Eosinophils, Absolute 0.01 10*3/mm3      Basophils, Absolute 0.06 10*3/mm3      Immature Grans, Absolute 0.21 10*3/mm3      nRBC 0.0 /100 WBC     COVID PRE-OP / PRE-PROCEDURE SCREENING ORDER (NO ISOLATION) - Swab, Nasopharynx [[id number]] Collected:  08/12/20 0246    Specimen:  Swab from Nasopharynx Updated:  08/12/20 0311    Narrative:       The following orders were created for panel order COVID PRE-OP / PRE-PROCEDURE SCREENING ORDER (NO ISOLATION) - Swab, Nasopharynx.  Procedure                               Abnormality         Status                     ---------                               -----------         ------                     COVID-19, ABBOTT IN-HOUS...[[id number]]  Normal              Final result                 Please view results for these tests on the individual orders.    COVID-19, ABBOTT IN-HOUSE,NP Swab (NO TRANSPORT MEDIA) 2 HR TAT - Swab, Nasopharynx [[id number]]  (Normal) Collected:  08/12/20 0246    Specimen:  Swab from Nasopharynx Updated:  08/12/20 0311     COVID19 Not Detected    Narrative:       Fact sheet for providers: https://www.fda.gov/media/043939/download     Fact sheet for patients: https://www.fda.gov/media/226452/download    Blood Culture - Blood, Arm, Right [[id number]] Collected:  08/12/20 0235    Specimen:  Blood from Arm, Right Updated:  08/12/20 0250    Blood Culture - Blood, Arm, Left [[id number]] Collected:  08/12/20 0241    Specimen:  Blood from Arm, Left Updated:  08/12/20 0249    Comprehensive Metabolic Panel [[id number]]   (Abnormal) Collected:  08/12/20 0114    Specimen:  Blood Updated:  08/12/20 0139     Glucose 127 mg/dL      BUN 32 mg/dL      Creatinine 4.40 mg/dL      Sodium 140 mmol/L      Potassium 4.2 mmol/L      Chloride 101 mmol/L      CO2 20.0 mmol/L      Calcium 8.5 mg/dL      Total Protein 7.3 g/dL      Albumin 4.10 g/dL      ALT (SGPT) 48 U/L      AST (SGOT) 38 U/L      Alkaline Phosphatase 120 U/L      Total Bilirubin 0.5 mg/dL      eGFR Non African Amer 14 mL/min/1.73      Comment: <15 Indicative of kidney failure.        eGFR   Amer --     Comment: <15 Indicative of kidney failure.        Globulin 3.2 gm/dL      A/G Ratio 1.3 g/dL      BUN/Creatinine Ratio 7.3     Anion Gap 19.0 mmol/L     Narrative:       GFR Normal >60  Chronic Kidney Disease <60  Kidney Failure <15      CK [[id number]]  (Normal) Collected:  08/12/20 0114    Specimen:  Blood Updated:  08/12/20 0138     Creatine Kinase 35 U/L     Ammonia [[id number]]  (Normal) Collected:  08/12/20 0114    Specimen:  Blood Updated:  08/12/20 0138     Ammonia 33 umol/L     Troponin [[id number]]  (Normal) Collected:  08/12/20 0114    Specimen:  Blood Updated:  08/12/20 0137     Troponin T 0.010 ng/mL     Narrative:       Troponin T Reference Range:  <= 0.03 ng/mL-   Negative for AMI  >0.03 ng/mL-     Abnormal for myocardial necrosis.  Clinicians would have to utilize clinical acumen, EKG, Troponin and serial changes to determine if it is an Acute Myocardial Infarction or myocardial injury due to an underlying chronic condition.       Results may be falsely decreased if patient taking Biotin.      Lipase [[id number]]  (Normal) Collected:  08/12/20 0114    Specimen:  Blood Updated:  08/12/20 0134     Lipase 44 U/L     Ethanol [[id number]] Collected:  08/12/20 0114    Specimen:  Blood Updated:  08/12/20 0134     Ethanol % <0.010 %     Narrative:       Not for legal purposes. Chain of Custody not followed.     Lactic Acid, Plasma [[id number]]  (Normal) Collected:  08/12/20  0114    Specimen:  Blood Updated:  08/12/20 0134     Lactate 1.2 mmol/L     CBC & Differential [[id number]] Collected:  08/12/20 0114    Specimen:  Blood Updated:  08/12/20 0122    Narrative:       The following orders were created for panel order CBC & Differential.  Procedure                               Abnormality         Status                     ---------                               -----------         ------                     CBC Auto Differential[[id number]]        Abnormal            Final result                 Please view results for these tests on the individual orders.    CBC Auto Differential [[id number]]  (Abnormal) Collected:  08/12/20 0114    Specimen:  Blood Updated:  08/12/20 0122     WBC 27.25 10*3/mm3      RBC 4.77 10*6/mm3      Hemoglobin 13.5 g/dL      Hematocrit 40.5 %      MCV 84.9 fL      MCH 28.3 pg      MCHC 33.3 g/dL      RDW 14.8 %      RDW-SD 45.6 fl      MPV 9.8 fL      Platelets 219 10*3/mm3      Neutrophil % 92.3 %      Lymphocyte % 3.5 %      Monocyte % 3.3 %      Eosinophil % 0.0 %      Basophil % 0.3 %      Immature Grans % 0.6 %      Neutrophils, Absolute 25.14 10*3/mm3      Lymphocytes, Absolute 0.95 10*3/mm3      Monocytes, Absolute 0.91 10*3/mm3      Eosinophils, Absolute 0.01 10*3/mm3      Basophils, Absolute 0.08 10*3/mm3      Immature Grans, Absolute 0.16 10*3/mm3      nRBC 0.0 /100 WBC         pt admitted to floor with acute renal failure; pt is A&O; up with assistance; pt screened sepsis positive upon admit to floor; BP is low; safety discussed;

## 2020-08-12 NOTE — ED PROVIDER NOTES
"Subjective     61 y/o male recently released from NH to daughter arrives for evaluation of somulance and hypotension. The daughter states the patient complained he was tired \"all day\" and kept nodding off. She noted his BP was low and called the NH who advised she give him water and repeat his BP. She did so and found it to be 70s thus presented here for evaluation. He notes only right sided flank pain which he states he has a history of chronic back pain. Of note the DA was given a literal bag of medications (blast packs) and is having a difficult time with giving his medications to him stating that he was taking his own by himself today. She denies any falls or trauma but does note one episode of vomiting upon arrival here. Of note the patient was a former alcoholic but has not been drinking in \"years\" per daughter. Further of note the patient is also being treated for PNA.           Family, social and past history reviewed as below, prior documentation of H and Ps and other documentation are reviewed:    Past Medical History:  No date: Anxiety  No date: COPD (chronic obstructive pulmonary disease) (CMS/[facility])  No date: Depression  No date: Encephalopathy  No date: GERD (gastroesophageal reflux disease)  No date: Hepatitis C  No date: Hyperlipidemia  No date: Hypertension  No date: Muscle weakness  No date: Stroke (CMS/[facility])    Past Surgical History:  No date: CERVICAL SPINE SURGERY  No date: CHOLECYSTECTOMY  No date: COLONOSCOPY  No date: ENDOSCOPY  No date: FEMUR SURGERY  No date: REPLACEMENT TOTAL KNEE BILATERAL    Social History    Socioeconomic History      Marital status: Single      Spouse name: Not on file      Number of children: Not on file      Years of education: Not on file      Highest education level: Not on file    Tobacco Use      Smoking status: Current Every Day Smoker      Smokeless tobacco: Never Used    Substance and Sexual Activity      Alcohol use: Yes      Drug use: No      Sexual activity: " Defer      Family history: reviewed and noncontributory           Review of Systems   All other systems reviewed and are negative.      Past Medical History:   Diagnosis Date   • Anxiety    • COPD (chronic obstructive pulmonary disease) (CMS/HCC)    • Depression    • Encephalopathy    • GERD (gastroesophageal reflux disease)    • Hepatitis C    • Hyperlipidemia    • Hypertension    • Muscle weakness    • Stroke (CMS/HCC)        Allergies   Allergen Reactions   • Codeine Rash       Past Surgical History:   Procedure Laterality Date   • CERVICAL SPINE SURGERY     • CHOLECYSTECTOMY     • COLONOSCOPY     • ENDOSCOPY     • FEMUR SURGERY     • REPLACEMENT TOTAL KNEE BILATERAL         History reviewed. No pertinent family history.    Social History     Socioeconomic History   • Marital status: Single     Spouse name: Not on file   • Number of children: Not on file   • Years of education: Not on file   • Highest education level: Not on file   Tobacco Use   • Smoking status: Current Every Day Smoker   • Smokeless tobacco: Never Used   Substance and Sexual Activity   • Alcohol use: Yes   • Drug use: No   • Sexual activity: Defer           Objective   Physical Exam   Constitutional: He is oriented to person, place, and time. He appears well-developed and well-nourished.   HENT:   Head: Normocephalic and atraumatic.   Eyes: Pupils are equal, round, and reactive to light. Conjunctivae and EOM are normal.   Neck: Normal range of motion. Neck supple.   Cardiovascular: Regular rhythm, normal heart sounds and intact distal pulses. Tachycardia present.   Pulmonary/Chest: Effort normal. No stridor. No respiratory distress. He has no wheezes. He has rhonchi. He has no rales. He exhibits no tenderness.   Abdominal: Soft. Bowel sounds are normal. He exhibits no distension and no mass. There is no tenderness. There is CVA tenderness. There is no rebound and no guarding. No hernia.   Musculoskeletal: Normal range of motion.   Neurological:  He is alert and oriented to person, place, and time. No cranial nerve deficit or sensory deficit.   Skin: Skin is warm. Capillary refill takes less than 2 seconds.   Psychiatric: He has a normal mood and affect. His behavior is normal.   Vitals reviewed.      Procedures           ED Course            XR Chest 1 View   ED Interpretation   Left lower lung opacity       Final Result   1. COPD no acute cardiopulmonary process.           This report was finalized on 08/12/2020 07:00 by Dr. Kevin Gould MD.      CT Abdomen Pelvis Without Contrast    (Results Pending)   XR Hip With or Without Pelvis 2 - 3 View Right    (Results Pending)   CT Lower Extremity Right Without Contrast    (Results Pending)     Labs Reviewed   COMPREHENSIVE METABOLIC PANEL - Abnormal; Notable for the following components:       Result Value    Glucose 127 (*)     BUN 32 (*)     Creatinine 4.40 (*)     CO2 20.0 (*)     Calcium 8.5 (*)     ALT (SGPT) 48 (*)     Alkaline Phosphatase 120 (*)     eGFR Non  Amer 14 (*)     Anion Gap 19.0 (*)     All other components within normal limits    Narrative:     GFR Normal >60  Chronic Kidney Disease <60  Kidney Failure <15     CBC WITH AUTO DIFFERENTIAL - Abnormal; Notable for the following components:    WBC 27.25 (*)     Neutrophil % 92.3 (*)     Lymphocyte % 3.5 (*)     Monocyte % 3.3 (*)     Eosinophil % 0.0 (*)     Immature Grans % 0.6 (*)     Neutrophils, Absolute 25.14 (*)     Monocytes, Absolute 0.91 (*)     Immature Grans, Absolute 0.16 (*)     All other components within normal limits   CBC WITH AUTO DIFFERENTIAL - Abnormal; Notable for the following components:    WBC 28.10 (*)     RBC 4.12 (*)     Hemoglobin 11.8 (*)     Hematocrit 36.1 (*)     Neutrophil % 89.6 (*)     Lymphocyte % 6.4 (*)     Monocyte % 3.1 (*)     Eosinophil % 0.0 (*)     Immature Grans % 0.7 (*)     Neutrophils, Absolute 25.15 (*)     Immature Grans, Absolute 0.21 (*)     All other components within normal limits    COMPREHENSIVE METABOLIC PANEL - Abnormal; Notable for the following components:    Glucose 113 (*)     BUN 31 (*)     Creatinine 4.03 (*)     CO2 20.0 (*)     Calcium 7.3 (*)     Albumin 3.40 (*)     eGFR Non  Amer 15 (*)     All other components within normal limits    Narrative:     GFR Normal >60  Chronic Kidney Disease <60  Kidney Failure <15     COVID-19,ABBOTT IN-HOUSE,NP SWAB (NO TRANSPORT MEDIA) 2 HR TAT - Normal    Narrative:     Fact sheet for providers: https://www.fda.gov/media/136166/download     Fact sheet for patients: https://www.fda.gov/media/506725/download   LIPASE - Normal   LACTIC ACID, PLASMA - Normal   TROPONIN (IN-HOUSE) - Normal    Narrative:     Troponin T Reference Range:  <= 0.03 ng/mL-   Negative for AMI  >0.03 ng/mL-     Abnormal for myocardial necrosis.  Clinicians would have to utilize clinical acumen, EKG, Troponin and serial changes to determine if it is an Acute Myocardial Infarction or myocardial injury due to an underlying chronic condition.       Results may be falsely decreased if patient taking Biotin.     CK - Normal   AMMONIA - Normal   COVID PRE-OP / PRE-PROCEDURE SCREENING ORDER (NO ISOLATION)    Narrative:     The following orders were created for panel order COVID PRE-OP / PRE-PROCEDURE SCREENING ORDER (NO ISOLATION) - Swab, Nasopharynx.  Procedure                               Abnormality         Status                     ---------                               -----------         ------                     COVID-19, ABBOTT IN-HOUS...[[id number]]  Normal              Final result                 Please view results for these tests on the individual orders.   BLOOD CULTURE   BLOOD CULTURE   RESPIRATORY CULTURE   MRSA SCREEN, PCR   ETHANOL    Narrative:     Not for legal purposes. Chain of Custody not followed.    URINALYSIS W/ MICROSCOPIC IF INDICATED (NO CULTURE)   URINE DRUG SCREEN   CBC AND DIFFERENTIAL    Narrative:     The following orders were created for  panel order CBC & Differential.  Procedure                               Abnormality         Status                     ---------                               -----------         ------                     CBC Auto Differential[[id number]]        Abnormal            Final result                 Please view results for these tests on the individual orders.                                       MDM    Final diagnoses:   Acute renal failure, unspecified acute renal failure type (CMS/[facility])   Sepsis, due to unspecified organism, unspecified whether acute organ dysfunction present (CMS/[facility])            Altaf Shipman MD  08/12/20 0767

## 2020-08-12 NOTE — SEPSIS FOCUSED EXAM
SEPTIC SHOCK FOCUSED EXAM    *Must be completed by a licensed independent Practitioner within 6 hours of diagnosis for the following conditions -- Septic Shock or Severe Sepsis with Lactate >/= 4.    Fluid bolus must be completed prior to assessment.      Diagnosis: severe sepsis w/ organ failure     Vital Signs (Attestation) Reviewed Temp, HR, RR, BP, SpO2   Shock Index (HR/SBP) (Attestation) Reviewed    Urine Output (Attestation) Reviewed   General resting comfortably, no distress and appears non-toxic   Respiratory diminished w/o wheezing or rales, no respiratory distress, breathing regular   Cardiac/Chest regular rate, rhythm   Skin (documentation of skin color is required) warm/dry   Capillary Refill <3 seconds   Peripheral Pulses Checked                          radial  palpable     † Septic shock is defined by CMS as severe sepsis plus one of the following: persistent hypotension after fluid bolus OR tissue hypoperfusion (Lactic Acid ?4)  †† ONE OF THE FOLLOWING can be done in lieu of the Focused Exam: Measure CVP; Measure ScVO2; Echocardiogram (cardiac echo or cardiac ultrasound); Dynamic assessment of fluid responsiveness with passive leg raise or fluid challenge.    Pravin Michelle DO  08/12/20  12:08     Atrial fibrillation    Benign essential hypertension    Cosmetic Surgery  chin implant 2004  Diabetes mellitus    Male stress incontinence  s/p artifical urinary sphincter 5/2012  S/P ablation operation for arrhythmia    S/P Appendectomy  1950  S/P arthroscopy  right shoulder 12/11  S/P Colonoscopy with Polypectomy  2009  S/P prostatectomy  radical robotic 6/10  Status post left knee replacement  June 25 ,2018 Left  Jan2018  right knee  Varicose vein-s/p vein stripping 5 years ago

## 2020-08-12 NOTE — PROGRESS NOTES
Pharmacy Dosing Service  Antimicrobials  Cefepime    Assessment/Action/Plan:  Consult place per attending for Pharmacy to dose Cefepime.  Patient currently in acute renal failure.  Initiated Cefepime 2000 mg iv q 24hrs per extended infusion protocol. Pharmacy will continue to monitor and adjust as needed.  Current end date: 8/18/20     Subjective:  Lance Rea is a 60 y.o. male currently being treated for Sepsis, Pneumonia.    Objective:  Estimated Creatinine Clearance: 18.2 mL/min (A) (by C-G formula based on SCr of 4.03 mg/dL (H)).   Lab Results   Component Value Date    CREATININE 4.03 (H) 08/12/2020    CREATININE 4.40 (H) 08/12/2020    CREATININE 0.92 09/12/2017     Lab Results   Component Value Date    WBC 28.10 (H) 08/12/2020     Temp Readings from Last 1 Encounters:   08/12/20 98.8 °F (37.1 °C) (Oral)       Culture Results:  Microbiology Results (last 10 days)       Procedure Component Value - Date/Time    COVID PRE-OP / PRE-PROCEDURE SCREENING ORDER (NO ISOLATION) - Swab, Nasopharynx [[id number]] Collected:  08/12/20 0246    Lab Status:  Final result Specimen:  Swab from Nasopharynx Updated:  08/12/20 0311    Narrative:       The following orders were created for panel order COVID PRE-OP / PRE-PROCEDURE SCREENING ORDER (NO ISOLATION) - Swab, Nasopharynx.  Procedure                               Abnormality         Status                     ---------                               -----------         ------                     COVID-19, ABBOTT IN-HOUS...[[id number]]  Normal              Final result                 Please view results for these tests on the individual orders.    COVID-19, ABBOTT IN-HOUSE,NP Swab (NO TRANSPORT MEDIA) 2 HR TAT - Swab, Nasopharynx [[id number]]  (Normal) Collected:  08/12/20 0246    Lab Status:  Final result Specimen:  Swab from Nasopharynx Updated:  08/12/20 0311     COVID19 Not Detected    Narrative:       Fact sheet for providers: https://www.fda.gov/media/194107/download      Fact sheet for patients: https://www.fda.gov/media/577416/download          Current Antimicrobials:   Anti-Infectives (From admission, onward)      Ordered     Dose/Rate Route Frequency Start Stop    08/12/20 0611  vancomycin 750 mg/250 mL 0.9% NS IVPB ([facility])     Note to Pharmacy:  Please dose   Ordering Provider:  Hill Álvarez DO    750 mg  over 60 Minutes Intravenous Every 48 Hours 08/14/20 0600 08/18/20 0559    08/12/20 0854  cefepime (MAXIPIME) 2 g/100 mL 0.9% NS (mbp)     Ordering Provider:  Pravin Michelle DO    2 g  over 4 Hours Intravenous Every 24 Hours 08/12/20 1800 08/19/20 1759    08/12/20 0854  cefepime (MAXIPIME) 2 g/100 mL 0.9% NS (mbp)     Ordering Provider:  Pravin Michelle DO    2 g  200 mL/hr over 30 Minutes Intravenous Once 08/12/20 0945      08/12/20 0124  piperacillin-tazobactam (ZOSYN) 3.375 g/100 mL 0.9% NS IVPB ([facility])     Ordering Provider:  Altaf Shipman MD    3.375 g  over 30 Minutes Intravenous Once 08/12/20 0126 08/12/20 0517    08/12/20 0124  vancomycin 1250 mg/250 mL 0.9% NS IVPB ([facility])     Ordering Provider:  Altaf Shipman MD    20 mg/kg × 63.5 kg  over 90 Minutes Intravenous Once 08/12/20 0126 08/12/20 0658            FAISAL Mena [facility]  08/12/20 08:54

## 2020-08-12 NOTE — PLAN OF CARE
Pt blood pressure has remained somewhat low, but he is asymptomatic. Rhythm remains sinus 80-90. He is voiding adequately, has eaten all of his meals, is able to change his position independently.

## 2020-08-12 NOTE — PLAN OF CARE
Problem: Patient Care Overview  Goal: Plan of Care Review  Outcome: Ongoing (interventions implemented as appropriate)  Flowsheets (Taken 8/12/2020 0739)  Progress: no change  Plan of Care Reviewed With: patient  Outcome Summary: pt admitted to floor with acute renal failure; pt is A&O; up with assistance; pt screened sepsis positive upon admit to floor; BP is low; safety discussed; bed check on; will continue to monitor

## 2020-08-12 NOTE — PROGRESS NOTES
Discharge Planning Assessment  [facility]     Patient Name: Lance Rea  MRN: [id number]  Today's Date: 8/12/2020    Admit Date: 8/11/2020    Discharge Needs Assessment     Row Name 08/12/20 0928       Living Environment    Lives With  child(eloise), adult    Name(s) of Who Lives With Patient  Daughter- Tea    Current Living Arrangements  home/apartment/condo    Primary Care Provided by  self    Provides Primary Care For  no one    Family Caregiver if Needed  child(leoise), adult    Quality of Family Relationships  helpful;involved;supportive    Able to Return to Prior Arrangements  yes       Resource/Environmental Concerns    Resource/Environmental Concerns  none       Transition Planning    Patient/Family Anticipates Transition to  home with family    Patient/Family Anticipated Services at Transition  home health care    Transportation Anticipated  family or friend will provide       Discharge Needs Assessment    Readmission Within the Last 30 Days  no previous admission in last 30 days    Concerns to be Addressed  no discharge needs identified    Equipment Currently Used at Home  walker, rolling    Anticipated Changes Related to Illness  none    Equipment Needed After Discharge  none    Current Discharge Risk  chronically ill    Discharge Coordination/Progress  Pt has PCP and RX coverage.  Pt can afford medications.  Pt is open to  services if MD feels necessary.          Discharge Plan    No documentation.       Destination      Coordination has not been started for this encounter.      Durable Medical Equipment      Coordination has not been started for this encounter.      Dialysis/Infusion      Coordination has not been started for this encounter.      Home Medical Care      Coordination has not been started for this encounter.      Therapy      Coordination has not been started for this encounter.      Community Resources      Coordination has not been started for this encounter.          Demographic Summary     No documentation.       Functional Status    No documentation.       Psychosocial    No documentation.       Abuse/Neglect    No documentation.       Legal    No documentation.       Substance Abuse    No documentation.       Patient Forms    No documentation.           PURA MckinneyW

## 2020-08-12 NOTE — PROGRESS NOTES
[facility] Medicine Services  INPATIENT PROGRESS NOTE    Patient Name: Lance Rea  Date of Admission: 8/11/2020  Today's Date: 08/12/20  Length of Stay: 0  Primary Care Physician: Reyes Soto MD    Subjective   Chief Complaint: f/u sepsis    HPI   Patient admitted by my colleague Dr. Rodriguez earlier today after recent hospital stay at [facility] for pneumonia.  On arrival was found of a white count of 28,000 with still imaging showing pneumonia.  He was hypotensive in the ER status post 2 L and admitted to the floor.  I saw the patient and he was hypotensive with a systolic of 79.  He is awake alert and oriented.  Is interactive.  He denies dizziness and says he just feels weak and lethargic.  Denies chest pain or shortness of breath.  No abdominal pain or nausea.  Overall does not look toxic.  Patient denies any chronic history of low blood pressure.      Review of Systems   All pertinent negatives and positives are as above. All other systems have been reviewed and are negative unless otherwise stated.     Objective    Temp:  [97.6 °F (36.4 °C)-98.9 °F (37.2 °C)] 98.8 °F (37.1 °C)  Heart Rate:  [] 87  Resp:  [12-19] 16  BP: ()/(49-75) 87/49  Physical Exam  GEN: Awake, alert, interactive, in NAD  HEENT: PERRLA, EOMI, Anicteric, Trachea midline  Lungs: diminished but no wheezing or rales  Heart: RRR, +S1/s2, no rub  ABD: soft, nt/nd, +BS, no guarding/rebound  Extremities:  no cyanosis, no edema  Skin: no rashes or petechiae  Neuro: AAOx3, no focal deficits  Psych: normal mood & affect        Results Review:  I have reviewed the labs, radiology results, and diagnostic studies.    Laboratory Data:   Results from last 7 days   Lab Units 08/12/20  0554 08/12/20  0114   WBC 10*3/mm3 28.10* 27.25*   HEMOGLOBIN g/dL 11.8* 13.5   HEMATOCRIT % 36.1* 40.5   PLATELETS 10*3/mm3 183 219        Results from last 7 days   Lab Units 08/12/20  0554 08/12/20  0114     SODIUM mmol/L 140 140   POTASSIUM mmol/L 4.1 4.2   CHLORIDE mmol/L 105 101   CO2 mmol/L 20.0* 20.0*   BUN mg/dL 31* 32*   CREATININE mg/dL 4.03* 4.40*   CALCIUM mg/dL 7.3* 8.5*   BILIRUBIN mg/dL 0.4 0.5   ALK PHOS U/L 96 120*   ALT (SGPT) U/L 36 48*   AST (SGOT) U/L 28 38   GLUCOSE mg/dL 113* 127*       Culture Data:   No results found for: BLOODCX, URINECX, WOUNDCX, MRSACX, RESPCX, STOOLCX    Radiology Data:   Imaging Results (Last 24 Hours)     Procedure Component Value Units Date/Time    CT Abdomen Pelvis Without Contrast [[id number]] Collected:  08/12/20 0746     Updated:  08/12/20 0754    Narrative:       CT abdomen pelvis without contrast     Indication: Back pain, nausea, vomiting     Comparison: 4/22/2012     DOSE LENGTH PRODUCT: 183 mGy cm. Automated exposure control was also  utilized to decrease patient radiation dose.     Findings:     LEFT lower lobe consolidation.     Shrunken nodular liver. Prior cholecystectomy. Dilated common bile duct  measuring 12 mm, unchanged from 2012. Spleen is enlarged measuring 13.1  cm craniocaudal dimension. Pancreas and adrenal glands appear  unremarkable.     No urolithiasis or hydronephrosis. No focal urinary bladder wall  thickening. Moderate size hiatal hernia. No abnormal bowel distention or  adjacent inflammation. Normal appendix. No ascites or free pelvic fluid.  No pelvic mass or pelvic collection.      Nonaneurysmal atherosclerotic abdominal aorta. No enlarged abdominal or  pelvic lymph nodes. No acute soft tissue finding. Multilevel lumbar  spine degenerative change and levoscoliotic curvature. No acute osseous  finding.       Impression:       Impression:  1.  LEFT lower lobe consolidation, likely representing pneumonia.  2.  No evidence of bowel obstruction or active bowel inflammation.  3.  Moderate size hiatal hernia.  4.  Liver appears cirrhotic. Mild splenomegaly.     These findings are in agreement with the critical and emergent findings  from the  [facility] preliminary report.  This report was finalized on 08/12/2020 07:51 by Dr. Tiburcio Lopez MD.    CT Lower Extremity Right Without Contrast [[id number]] Collected:  08/12/20 0727     Updated:  08/12/20 0734    Narrative:       CT of the RIGHT hip without contrast     Indication: RIGHT hip pain     Comparison: Same day radiograph     Automated exposure control was also utilized to decrease patient  radiation dose. Dose length product (mGy cm): 93     Findings:     Postoperative change of RIGHT femoral IM nail with dynamic head/neck  screw for transfixion of remote fracture which appears healed. No  evidence of hardware fracture or significant loosening. No acute femoral  head/neck fracture. Remote healed RIGHT inferior pubic rami fracture is  noted. The pubic symphysis is maintained. No acute finding in the  partially imaged pelvis.       Impression:       Impression:  1.  No acute fracture.  2.  Prior RIGHT femoral IM nail without evidence of hardware  complication.     These findings are in agreement with the critical and emergent findings  from the [facility] preliminary report.  This report was finalized on 08/12/2020 07:31 by Dr. Tiburcio Lopez MD.    XR Hip With or Without Pelvis 2 - 3 View Right [[id number]] Collected:  08/12/20 0717     Updated:  08/12/20 0722    Narrative:       XR HIP W OR WO PELVIS 2-3 VIEW RIGHT-     Indication: hip pain; N17.9-Acute kidney failure, unspecified;  A41.9-Sepsis, unspecified organism     Comparison: 9/12/2017     Findings:     No acute fracture or dislocation.   Partially imaged RIGHT femoral IM nail with dynamic head/neck screw.   Pubic symphysis and SI joints are maintained.   Remote RIGHT inferior pubic rami fracture is noted.   A few pelvic phleboliths are present.       Impression:       Impression:     No acute osseous finding.  This report was finalized on 08/12/2020 07:19 by Dr. Tiburcio Lopez MD.    XR Chest 1 View [[id number]] Collected:  08/12/20 0659     Updated:   08/12/20 0703    Narrative:       Frontal upright radiograph of the chest 8/12/2020 1:02 AM CDT     COMPARISON: September 12, 2017     HISTORY hypotension.     FINDINGS:   Hyperinflation and chronic changes noted in the pulmonary parenchyma. No  infiltrates are identified.. The cardiomediastinal silhouette and  pulmonary vascularity are within normal limits.      The osseous structures and surrounding soft tissues demonstrate no acute  abnormality.       Impression:       1. COPD no acute cardiopulmonary process.        This report was finalized on 08/12/2020 07:00 by Dr. Kevin Gould MD.          I have reviewed the patient's current medications.     Assessment/Plan     Active Hospital Problems    Diagnosis   • **Sepsis, unspecified organism (CMS/HCC)   • Acute renal failure (CMS/HCC)   • HCAP (healthcare-associated pneumonia)   • Benign essential HTN   • Hyperlipidemia       #1 sepsis -from healthcare associated pneumonia.  Patient admitted on Vanco and Zosyn.  Given renal failure concurrently will change Zosyn to cefepime for now.  Continue aggressive IV fluid resuscitation.  A third liter IV fluid bolus was ordered and patient is still hypotensive.  Will transfer to ICU.  Continue maintenance fluids @100/hr.  Overall he does not look toxic.  Lactates are normal.  There is some question of him being chronically hypotensive as he has Midodrin on his home med list but he denies chronic hypotension.  Do not feel he needs pressors at this time but may become necessary, will monitor closely.    #2 Healthcare associated pneumonia -seen at [facility].  Still has pneumonia evident on imaging.  Given his white count and hypotension will treat as healthcare associated pneumonia.  Continue Vanco and as above change Zosyn to cefepime.  Follow-up cultures    #3 acute renal failure -setting of sepsis and hypotension.  Unclear baseline.  Will check a renal ultrasound.  Continue IV fluids.  Already starting to improve.   He is making urine.  May need bicarb fluids at some point.  Monitor closely.    #4 hypertension -needs verified med list from [facility] has metoprolol, lisinopril, as needed hydralazine, and 3 times daily Midodrine.  Unclear why he is on his combination of medications.  Unclear if he is normally hypertensive or hypotensive at baseline. Will add back midodrine given he has already had 3 liters of IVF and remains with low bp.     #5 hyperlipidemia -statin    #6 right hip pain -patient had complained of right hip pain on arrival.  Imaging showed no acute right hip fracture.  Showed an old right femoral nail without evidence of hardware complication.  There is also a mention of a remote right inferior pubic rami fracture.  This was discussed with Dr. Lopez and confirmed that this was an old  And healed fracture.  Nothing acute.  Plan on PT and OT.  Weightbearing as tolerated.      Discharge Planning: Ongoing.  Monitor in the ICU today.  Follow labs.    Electronically signed by Pravin Michelle DO, 08/12/20, 08:48.

## 2020-08-12 NOTE — H&P
[facility] Medicine Services  HISTORY AND PHYSICAL    Date of Admission: 8/11/2020  Primary Care Physician: Reyes Soto MD    Subjective     Chief Complaint: Lethargy    History of Present Illness  60-year-old male who was recently discharged from [facility] on Wednesday for pneumonia.  He has been on outpatient Omnicef.  He was sent to his daughter's home on Friday from [facility].  He was very lethargic today.  He has had decreased urinary output.  He has had a nonproductive cough.  He has not felt well all day.  His daughter took his blood pressure at home and it was in the 70 systolic, and she brought him in due to concern.  The patient states that he just has not been feeling well.  No bowel movement today, he did eat a sandwich earlier, but that was all.  Right hip pain with recent repair, patient states that he is unable to lie on his hip.  He has chest pressure.        Review of Systems   Right hip pain  Nonproductive cough  Chest pressure  Lethargy due to hypotension    Otherwise complete ROS reviewed and negative except as mentioned in the HPI.    Past Medical History:   Past Medical History:   Diagnosis Date   • Anxiety    • COPD (chronic obstructive pulmonary disease) (CMS/HCC)    • Depression    • Encephalopathy    • GERD (gastroesophageal reflux disease)    • Hepatitis C    • Hyperlipidemia    • Hypertension    • Muscle weakness    • Stroke (CMS/HCC)      Past Surgical History:  Past Surgical History:   Procedure Laterality Date   • CERVICAL SPINE SURGERY     • CHOLECYSTECTOMY     • COLONOSCOPY     • ENDOSCOPY     • FEMUR SURGERY     • REPLACEMENT TOTAL KNEE BILATERAL       Social History:  reports that he has been smoking. He has never used smokeless tobacco. He reports that he drinks alcohol. He reports that he does not use drugs.    Family History: CAD    Allergies:  Allergies   Allergen Reactions   • Codeine Rash      Medications:  Prior to Admission medications    Medication Sig Start Date End Date Taking? Authorizing Provider   acetaminophen (TYLENOL) 325 MG tablet Take 650 mg by mouth Every 6 (Six) Hours As Needed for Mild Pain  or Fever.    Antony De Oliveira MD   aspirin 81 MG chewable tablet Chew 81 mg Daily.    Antony De Oliveira MD   butalbital-acetaminophen-caffeine (FIORICET, ESGIC) -40 MG per tablet Take 1 tablet by mouth Every 4 (Four) Hours As Needed for Headache. 6/4/19   Nick Carver MD   clonazePAM (KlonoPIN) 0.5 MG tablet Take 0.5 mg by mouth 2 (Two) Times a Day.    Antony De Oliveira MD   diphenhydrAMINE HCl (BANOPHEN PO) Take 50 mg by mouth 4 (Four) Times a Day As Needed.    Antony De Oliveira MD   docusate sodium (COLACE) 100 MG capsule Take 100 mg by mouth 2 (Two) Times a Day.    Antony De Oliveira MD   folic acid (FOLVITE) 1 MG tablet Take 1 mg by mouth Daily.    Antony De Oliveira MD   gabapentin (NEURONTIN) 300 MG capsule Take 300 mg by mouth Every Night.    Antony De Oliveira MD   hydrALAZINE (APRESOLINE) 10 MG tablet Take 10 mg by mouth 3 (Three) Times a Day As Needed (SBP greater than 160).    Antony De Oliveira MD   Lactobacillus Acidophilus powder Take  by mouth Every Night.    Antony De Oliveira MD   lisinopril (PRINIVIL,ZESTRIL) 20 MG tablet Take 20 mg by mouth Daily.    Antony De Oliveira MD   magnesium hydroxide (MILK OF MAGNESIA) 400 MG/5ML suspension Take 30 mL by mouth Daily As Needed for Constipation.    Antony De Oliveira MD   meloxicam (MOBIC) 15 MG tablet Take 15 mg by mouth Daily.    Antony De Oliveira MD   metoprolol succinate XL (TOPROL-XL) 50 MG 24 hr tablet Take 50 mg by mouth Daily.    Antony De Oliveira MD   midodrine (PROAMATINE) 5 MG tablet Take 5 mg by mouth 3 (Three) Times a Day.    Antony De Oliveira MD   ondansetron (ZOFRAN) 4 MG tablet Take 4 mg by mouth Every 8 (Eight) Hours As Needed for Nausea or Vomiting.     "Antony De Oliveira MD   pantoprazole (PROTONIX) 40 MG EC tablet Take 40 mg by mouth Daily.    Antony De Oliveira MD   sertraline (ZOLOFT) 100 MG tablet Take 150 mg by mouth Every Night.    Antony De Oliveira MD   simvastatin (ZOCOR) 10 MG tablet Take 10 mg by mouth Every Night.    Antony De Oliveira MD   thiamine (VITAMIN B-1) 100 MG tablet Take 100 mg by mouth Daily.    Antony De Oliveira MD   traMADol (ULTRAM) 50 MG tablet Take 100 mg by mouth Every 8 (Eight) Hours As Needed for Moderate Pain .    Antony De Oliveira MD   zolpidem (AMBIEN) 5 MG tablet Take 5 mg by mouth Every Night.    Antony De Oliveira MD     Objective     Vital Signs: BP (!) 78/56   Pulse 111   Temp 97.6 °F (36.4 °C) (Oral)   Resp 12   Ht 170.2 cm (67\")   Wt 63.5 kg (140 lb)   SpO2 94%   BMI 21.93 kg/m²   Physical Exam   Constitutional:   Thin and frail, eyes sunken.   HENT:   Head: Normocephalic and atraumatic.   Nose: Nose normal.   Mouth/Throat: Oropharynx is clear and moist.   Eyes: Conjunctivae and EOM are normal.   Neck: Normal range of motion. Neck supple.   Cardiovascular: Normal rate, regular rhythm and normal heart sounds.   Pulmonary/Chest: Effort normal. He has rales.   Abdominal: Soft. Bowel sounds are normal.   Musculoskeletal: He exhibits tenderness. He exhibits no edema.   Right lateral hip pain with palpable hard area at incision level.   Neurological: He is alert. No cranial nerve deficit.   Skin: Skin is warm and dry.   Psychiatric: He has a normal mood and affect.   Vitals reviewed.           Results Reviewed:  Lab Results (last 24 hours)     Procedure Component Value Units Date/Time    COVID PRE-OP / PRE-PROCEDURE SCREENING ORDER (NO ISOLATION) - Swab, Nasopharynx [[id number]] Collected:  08/12/20 0246    Specimen:  Swab from Nasopharynx Updated:  08/12/20 0311    Narrative:       The following orders were created for panel order COVID PRE-OP / PRE-PROCEDURE SCREENING ORDER (NO ISOLATION) - Swab, " Nasopharynx.  Procedure                               Abnormality         Status                     ---------                               -----------         ------                     COVID-19, ABBOTT IN-HOUS...[[id number]]  Normal              Final result                 Please view results for these tests on the individual orders.    COVID-19, ABBOTT IN-HOUSE,NP Swab (NO TRANSPORT MEDIA) 2 HR TAT - Swab, Nasopharynx [[id number]]  (Normal) Collected:  08/12/20 0246    Specimen:  Swab from Nasopharynx Updated:  08/12/20 0311     COVID19 Not Detected    Narrative:       Fact sheet for providers: https://www.fda.gov/media/359376/download     Fact sheet for patients: https://www.fda.gov/media/531280/download    Blood Culture - Blood, Arm, Right [[id number]] Collected:  08/12/20 0235    Specimen:  Blood from Arm, Right Updated:  08/12/20 0250    Blood Culture - Blood, Arm, Left [[id number]] Collected:  08/12/20 0241    Specimen:  Blood from Arm, Left Updated:  08/12/20 0249    Comprehensive Metabolic Panel [[id number]]  (Abnormal) Collected:  08/12/20 0114    Specimen:  Blood Updated:  08/12/20 0139     Glucose 127 mg/dL      BUN 32 mg/dL      Creatinine 4.40 mg/dL      Sodium 140 mmol/L      Potassium 4.2 mmol/L      Chloride 101 mmol/L      CO2 20.0 mmol/L      Calcium 8.5 mg/dL      Total Protein 7.3 g/dL      Albumin 4.10 g/dL      ALT (SGPT) 48 U/L      AST (SGOT) 38 U/L      Alkaline Phosphatase 120 U/L      Total Bilirubin 0.5 mg/dL      eGFR Non African Amer 14 mL/min/1.73      Comment: <15 Indicative of kidney failure.        eGFR   Amer --     Comment: <15 Indicative of kidney failure.        Globulin 3.2 gm/dL      A/G Ratio 1.3 g/dL      BUN/Creatinine Ratio 7.3     Anion Gap 19.0 mmol/L     Narrative:       GFR Normal >60  Chronic Kidney Disease <60  Kidney Failure <15      CK [[id number]]  (Normal) Collected:  08/12/20 0114    Specimen:  Blood Updated:  08/12/20 0138     Creatine Kinase 35 U/L      Ammonia [[id number]]  (Normal) Collected:  08/12/20 0114    Specimen:  Blood Updated:  08/12/20 0138     Ammonia 33 umol/L     Troponin [[id number]]  (Normal) Collected:  08/12/20 0114    Specimen:  Blood Updated:  08/12/20 0137     Troponin T 0.010 ng/mL     Narrative:       Troponin T Reference Range:  <= 0.03 ng/mL-   Negative for AMI  >0.03 ng/mL-     Abnormal for myocardial necrosis.  Clinicians would have to utilize clinical acumen, EKG, Troponin and serial changes to determine if it is an Acute Myocardial Infarction or myocardial injury due to an underlying chronic condition.       Results may be falsely decreased if patient taking Biotin.      Lipase [[id number]]  (Normal) Collected:  08/12/20 0114    Specimen:  Blood Updated:  08/12/20 0134     Lipase 44 U/L     Ethanol [[id number]] Collected:  08/12/20 0114    Specimen:  Blood Updated:  08/12/20 0134     Ethanol % <0.010 %     Narrative:       Not for legal purposes. Chain of Custody not followed.     Lactic Acid, Plasma [[id number]]  (Normal) Collected:  08/12/20 0114    Specimen:  Blood Updated:  08/12/20 0134     Lactate 1.2 mmol/L     CBC & Differential [[id number]] Collected:  08/12/20 0114    Specimen:  Blood Updated:  08/12/20 0122    Narrative:       The following orders were created for panel order CBC & Differential.  Procedure                               Abnormality         Status                     ---------                               -----------         ------                     CBC Auto Differential[[id number]]        Abnormal            Final result                 Please view results for these tests on the individual orders.    CBC Auto Differential [[id number]]  (Abnormal) Collected:  08/12/20 0114    Specimen:  Blood Updated:  08/12/20 0122     WBC 27.25 10*3/mm3      RBC 4.77 10*6/mm3      Hemoglobin 13.5 g/dL      Hematocrit 40.5 %      MCV 84.9 fL      MCH 28.3 pg      MCHC 33.3 g/dL      RDW 14.8 %      RDW-SD 45.6 fl      MPV 9.8 fL        Platelets 219 10*3/mm3      Neutrophil % 92.3 %      Lymphocyte % 3.5 %      Monocyte % 3.3 %      Eosinophil % 0.0 %      Basophil % 0.3 %      Immature Grans % 0.6 %      Neutrophils, Absolute 25.14 10*3/mm3      Lymphocytes, Absolute 0.95 10*3/mm3      Monocytes, Absolute 0.91 10*3/mm3      Eosinophils, Absolute 0.01 10*3/mm3      Basophils, Absolute 0.08 10*3/mm3      Immature Grans, Absolute 0.16 10*3/mm3      nRBC 0.0 /100 WBC         Imaging Results (Last 24 Hours)     Procedure Component Value Units Date/Time    CT Abdomen Pelvis Without Contrast [[id number]] Resulted:  08/12/20 0208     Updated:  08/12/20 0213    XR Chest 1 View [[id number]] Resulted:  08/12/20 0123     Updated:  08/12/20 0124        I have personally reviewed and interpreted the radiology studies and ECG obtained at time of admission.     Assessment / Plan     Assessment:   Active Hospital Problems    Diagnosis   • Acute renal failure (CMS/[facility])     Impression:  1.  Acute renal failure   2.  Metabolic acidosis with anion gap of 19  3.  Bilateral pneumonia  4.  Leukocytosis with shift  5.  Hypotension/sepsis  6.  History of COPD    Plan:   1.  IV fluids  2.  Follow blood pressure closely  3.  Vancomycin and Zosyn  4.  Hold blood pressure medications  5.  Hold NSAIDs  6.  Labs in the morning  7.  Right hip x-ray secondary to pain  8.  Mucinex and duo nebs with Acapella  9.  Respiratory culture  10.  PT OT consult    Code Status: Full     I discussed the patient's findings and my recommendations with the patient and sister at bedside    Estimated length of stay 3 to 4 days    Patient seen and examined by me on 8/12/2020    Electronically signed by Hill Álvarez DO, 08/12/20, 03:21.

## 2020-08-12 NOTE — CONSULTS
Pharmacy Dosing Service  Antimicrobials  Zosyn & Vancomycin    Assessment/Action/Plan:  Zosyn 2.25 gm IV every 8 hours with Pharmacy Note to Please dose ordered. Adjusted dose to extended-infusion dosing of 3.375 gm IV over 4 hours every 12 hours for patient with JOSE L (with CrCl < 20 ml/min) following initial 3.375 gm dose given in ER. Current end date.final dose: 8/15/20 at 0400. Pharmacy will continue to monitor renal function and adjust dose accordingly.    Vancomycin 500 mg IV every 24 hours with Pharmacy Note to Please dose ordered. Adjusted dose to Vancomycin 750 mg IVPB every 48 hours, following a 1,250 mg dose ordered in ER. Nasal PCR for MRSA ordered. Vancomycin random ordered on 8/13 at 0500 (~24 hours post loading dose). Current vancomycin end date/final dose: 8/16/20 at 0600. Pharmacy will monitor renal function and adjust dose accordingly.    Predicted Vt/AUC = 13.3 mcg/ml, 489 mcg*hr/ml (per ClinCalc)                                  13.31 mcg/ml, 487 mcg*hr/ml (per GlobalRph)    (per [employer] - [employer] model)  Loading dose: none  Regimen: 750 mg every 48 hours for 12 doses.  Start time: 08:03 on 08/12/2020  Exposure target: AUC24 (range)400-600 mg/L.hr  AUC24,ss: 514 mg/L.hr  PAUC*: 57 %  Ctrough,ss: 16.4 mg/L  Pconc*: 46 %  Tox.: 12 %     Subjective:  Lance Rea is a 60 y.o. male currently being treated for Pneumonia.    Objective:  Estimated Creatinine Clearance: 16.7 mL/min (A) (by C-G formula based on SCr of 4.4 mg/dL (H)).   Lab Results   Component Value Date    CREATININE 4.40 (H) 08/12/2020   Lab Results   Component Value Date    WBC 27.25 (H) 08/12/2020     Temp Readings from Last 1 Encounters:   08/12/20 98.9 °F (37.2 °C) (Oral)       Culture Results:  Microbiology Results (last 10 days)       Procedure Component Value - Date/Time    COVID PRE-OP / PRE-PROCEDURE SCREENING ORDER (NO ISOLATION) - Swab, Nasopharynx [[id number]] Collected:  08/12/20 0246    Lab Status:  Final result  Specimen:  Swab from Nasopharynx Updated:  08/12/20 0311    Narrative:       The following orders were created for panel order COVID PRE-OP / PRE-PROCEDURE SCREENING ORDER (NO ISOLATION) - Swab, Nasopharynx.  Procedure                               Abnormality         Status                     ---------                               -----------         ------                     COVID-19, [facility] IN-HOUS...[[id number]]  Normal              Final result                 Please view results for these tests on the individual orders.    COVID-19, [facility] IN-HOUSE,NP Swab (NO TRANSPORT MEDIA) 2 HR TAT - Swab, Nasopharynx [[id number]]  (Normal) Collected:  08/12/20 0246    Lab Status:  Final result Specimen:  Swab from Nasopharynx Updated:  08/12/20 0311     COVID19 Not Detected    Narrative:       Fact sheet for providers: https://www.fda.gov/media/507313/download     Fact sheet for patients: https://www.fda.gov/media/455475/download          Current Antimicrobials:   Anti-Infectives (From admission, onward)      Ordered     Dose/Rate Route Frequency Start Stop    08/12/20 0611  vancomycin 750 mg/250 mL 0.9% NS IVPB ([facility])     Note to Pharmacy:  Please dose   Ordering Provider:  Hill Álvarez DO    750 mg  over 60 Minutes Intravenous Every 48 Hours 08/14/20 0600 08/18/20 0559    08/12/20 0548  piperacillin-tazobactam (ZOSYN) 3.375 g in iso-osmotic dextrose 50 ml (premix)     Note to Pharmacy:  Please dose   Ordering Provider:  Hill Álvarez DO    3.375 g  over 4 Hours Intravenous Every 12 Hours 08/12/20 1600 08/15/20 1559    08/12/20 0124  piperacillin-tazobactam (ZOSYN) 3.375 g/100 mL 0.9% NS IVPB (mbp)     Ordering Provider:  Altaf Shipman MD    3.375 g  over 30 Minutes Intravenous Once 08/12/20 0126 08/12/20 0517    08/12/20 0124  vancomycin 1250 mg/250 mL 0.9% NS IVPB ([facility])     Ordering Provider:  Altaf Shipman MD    20 mg/kg × 63.5 kg  over 90 Minutes Intravenous Once 08/12/20 0126               Osmani Waite, PharmD  08/12/20 06:15

## 2020-08-13 LAB
ANION GAP SERPL CALCULATED.3IONS-SCNC: 10 MMOL/L (ref 5–15)
BASOPHILS # BLD AUTO: 0.03 10*3/MM3 (ref 0–0.2)
BASOPHILS NFR BLD AUTO: 0.2 % (ref 0–1.5)
BUN SERPL-MCNC: 21 MG/DL (ref 6–20)
BUN/CREAT SERPL: 11.8 (ref 7–25)
CALCIUM SPEC-SCNC: 8.1 MG/DL (ref 8.6–10.5)
CHLORIDE SERPL-SCNC: 112 MMOL/L (ref 98–107)
CO2 SERPL-SCNC: 20 MMOL/L (ref 22–29)
CREAT SERPL-MCNC: 1.78 MG/DL (ref 0.76–1.27)
DEPRECATED RDW RBC AUTO: 48.4 FL (ref 37–54)
EOSINOPHIL # BLD AUTO: 0.12 10*3/MM3 (ref 0–0.4)
EOSINOPHIL NFR BLD AUTO: 1 % (ref 0.3–6.2)
ERYTHROCYTE [DISTWIDTH] IN BLOOD BY AUTOMATED COUNT: 15.2 % (ref 12.3–15.4)
GFR SERPL CREATININE-BSD FRML MDRD: 39 ML/MIN/1.73
GLUCOSE SERPL-MCNC: 106 MG/DL (ref 65–99)
HCT VFR BLD AUTO: 34.2 % (ref 37.5–51)
HGB BLD-MCNC: 11.1 G/DL (ref 13–17.7)
IMM GRANULOCYTES # BLD AUTO: 0.07 10*3/MM3 (ref 0–0.05)
IMM GRANULOCYTES NFR BLD AUTO: 0.6 % (ref 0–0.5)
LYMPHOCYTES # BLD AUTO: 1.87 10*3/MM3 (ref 0.7–3.1)
LYMPHOCYTES NFR BLD AUTO: 15 % (ref 19.6–45.3)
MAGNESIUM SERPL-MCNC: 1.7 MG/DL (ref 1.6–2.6)
MCH RBC QN AUTO: 28.2 PG (ref 26.6–33)
MCHC RBC AUTO-ENTMCNC: 32.5 G/DL (ref 31.5–35.7)
MCV RBC AUTO: 87 FL (ref 79–97)
MONOCYTES # BLD AUTO: 0.52 10*3/MM3 (ref 0.1–0.9)
MONOCYTES NFR BLD AUTO: 4.2 % (ref 5–12)
NEUTROPHILS NFR BLD AUTO: 79 % (ref 42.7–76)
NEUTROPHILS NFR BLD AUTO: 9.88 10*3/MM3 (ref 1.7–7)
NRBC BLD AUTO-RTO: 0 /100 WBC (ref 0–0.2)
PLATELET # BLD AUTO: 151 10*3/MM3 (ref 140–450)
PMV BLD AUTO: 10.1 FL (ref 6–12)
POTASSIUM SERPL-SCNC: 4 MMOL/L (ref 3.5–5.2)
RBC # BLD AUTO: 3.93 10*6/MM3 (ref 4.14–5.8)
SODIUM SERPL-SCNC: 142 MMOL/L (ref 136–145)
VANCOMYCIN SERPL-MCNC: 8 MCG/ML (ref 5–40)
WBC # BLD AUTO: 12.49 10*3/MM3 (ref 3.4–10.8)

## 2020-08-13 PROCEDURE — 80202 ASSAY OF VANCOMYCIN: CPT | Performed by: INTERNAL MEDICINE

## 2020-08-13 PROCEDURE — 97162 PT EVAL MOD COMPLEX 30 MIN: CPT

## 2020-08-13 PROCEDURE — 94799 UNLISTED PULMONARY SVC/PX: CPT

## 2020-08-13 PROCEDURE — 83735 ASSAY OF MAGNESIUM: CPT | Performed by: INTERNAL MEDICINE

## 2020-08-13 PROCEDURE — 85025 COMPLETE CBC W/AUTO DIFF WBC: CPT | Performed by: INTERNAL MEDICINE

## 2020-08-13 PROCEDURE — 80048 BASIC METABOLIC PNL TOTAL CA: CPT | Performed by: INTERNAL MEDICINE

## 2020-08-13 PROCEDURE — 94664 DEMO&/EVAL PT USE INHALER: CPT

## 2020-08-13 PROCEDURE — 25010000002 VANCOMYCIN 10 G RECONSTITUTED SOLUTION: Performed by: INTERNAL MEDICINE

## 2020-08-13 PROCEDURE — 97165 OT EVAL LOW COMPLEX 30 MIN: CPT | Performed by: OCCUPATIONAL THERAPIST

## 2020-08-13 PROCEDURE — 25010000002 CEFEPIME PER 500 MG: Performed by: INTERNAL MEDICINE

## 2020-08-13 PROCEDURE — 99406 BEHAV CHNG SMOKING 3-10 MIN: CPT

## 2020-08-13 RX ADMIN — ACETAMINOPHEN 650 MG: 325 TABLET, FILM COATED ORAL at 20:05

## 2020-08-13 RX ADMIN — ATORVASTATIN CALCIUM 10 MG: 10 TABLET, FILM COATED ORAL at 20:50

## 2020-08-13 RX ADMIN — IPRATROPIUM BROMIDE AND ALBUTEROL SULFATE 3 ML: 2.5; .5 SOLUTION RESPIRATORY (INHALATION) at 10:40

## 2020-08-13 RX ADMIN — IPRATROPIUM BROMIDE AND ALBUTEROL SULFATE 3 ML: 2.5; .5 SOLUTION RESPIRATORY (INHALATION) at 06:23

## 2020-08-13 RX ADMIN — SODIUM CHLORIDE, PRESERVATIVE FREE 10 ML: 5 INJECTION INTRAVENOUS at 20:05

## 2020-08-13 RX ADMIN — CEFEPIME HYDROCHLORIDE 2 G: 2 INJECTION, POWDER, FOR SOLUTION INTRAVENOUS at 09:40

## 2020-08-13 RX ADMIN — IPRATROPIUM BROMIDE AND ALBUTEROL SULFATE 3 ML: 2.5; .5 SOLUTION RESPIRATORY (INHALATION) at 22:32

## 2020-08-13 RX ADMIN — PANTOPRAZOLE SODIUM 40 MG: 40 TABLET, DELAYED RELEASE ORAL at 08:01

## 2020-08-13 RX ADMIN — IPRATROPIUM BROMIDE AND ALBUTEROL SULFATE 3 ML: 2.5; .5 SOLUTION RESPIRATORY (INHALATION) at 13:44

## 2020-08-13 RX ADMIN — SODIUM CHLORIDE 100 ML/HR: 9 INJECTION, SOLUTION INTRAVENOUS at 14:57

## 2020-08-13 RX ADMIN — VANCOMYCIN HYDROCHLORIDE 1250 MG: 10 INJECTION, POWDER, LYOPHILIZED, FOR SOLUTION INTRAVENOUS at 06:11

## 2020-08-13 RX ADMIN — CLONAZEPAM 0.5 MG: 0.5 TABLET ORAL at 08:02

## 2020-08-13 RX ADMIN — ACETAMINOPHEN 650 MG: 325 TABLET, FILM COATED ORAL at 08:01

## 2020-08-13 RX ADMIN — SERTRALINE 150 MG: 100 TABLET, FILM COATED ORAL at 20:50

## 2020-08-13 RX ADMIN — ASPIRIN 81 MG 81 MG: 81 TABLET ORAL at 08:01

## 2020-08-13 RX ADMIN — CEFEPIME HYDROCHLORIDE 2 G: 2 INJECTION, POWDER, FOR SOLUTION INTRAVENOUS at 20:51

## 2020-08-13 RX ADMIN — GUAIFENESIN 1200 MG: 600 TABLET, EXTENDED RELEASE ORAL at 08:02

## 2020-08-13 RX ADMIN — SODIUM CHLORIDE, PRESERVATIVE FREE 10 ML: 5 INJECTION INTRAVENOUS at 08:02

## 2020-08-13 RX ADMIN — CLONAZEPAM 0.5 MG: 0.5 TABLET ORAL at 20:05

## 2020-08-13 RX ADMIN — GUAIFENESIN 1200 MG: 600 TABLET, EXTENDED RELEASE ORAL at 20:50

## 2020-08-13 NOTE — THERAPY EVALUATION
Acute Care - Occupational Therapy Initial Evaluation  [facility]     Patient Name: Lance Rea  : 1960  MRN: [id number]  Today's Date: 2020  Onset of Illness/Injury or Date of Surgery: 20  Date of Referral to OT: 20  Referring Physician: Dr. Michelle    Admit Date: 2020       ICD-10-CM ICD-9-CM   1. Acute renal failure, unspecified acute renal failure type (CMS/HCC) N17.9 584.9   2. Sepsis, due to unspecified organism, unspecified whether acute organ dysfunction present (CMS/HCC) A41.9 038.9     995.91   3. Decreased activities of daily living (ADL) Z78.9 V49.89     Patient Active Problem List   Diagnosis   • Cerebral contusion (CMS/HCC)   • Acute renal failure (CMS/HCC)   • Sepsis, unspecified organism (CMS/HCC)   • HCAP (healthcare-associated pneumonia)   • Benign essential HTN   • Hyperlipidemia     Past Medical History:   Diagnosis Date   • Anxiety    • COPD (chronic obstructive pulmonary disease) (CMS/HCC)    • Depression    • Encephalopathy    • GERD (gastroesophageal reflux disease)    • Hepatitis C    • Hyperlipidemia    • Hypertension    • Muscle weakness    • Stroke (CMS/HCC)      Past Surgical History:   Procedure Laterality Date   • CERVICAL SPINE SURGERY     • CHOLECYSTECTOMY     • COLONOSCOPY     • ENDOSCOPY     • FEMUR SURGERY     • REPLACEMENT TOTAL KNEE BILATERAL            OT ASSESSMENT FLOWSHEET (last 12 hours)      Occupational Therapy Evaluation     Row Name 20 0807                   OT Evaluation Time/Intention    Subjective Information  complains of;fatigue;weakness  -JJ        Document Type  evaluation see MAR  -JJ        Mode of Treatment  occupational therapy  -JJ        Patient Effort  good  -JJ           General Information    Patient Profile Reviewed?  yes  -JJ        Onset of Illness/Injury or Date of Surgery  20  -JJ        Referring Physician  Dr. Michelle  -JJ        Patient Observations  alert;cooperative;agree to therapy  -JJ         Patient/Family Observations  sister present in room   -JJ        General Observations of Patient  fowlers in bed, IV Infusing, 1L O2/NC, alert   -JJ        Prior Level of Function  independent:;all household mobility;community mobility;transfer;bed mobility;ADL's  -JJ        Equipment Currently Used at Home  rollator  -JJ        Pertinent History of Current Functional Problem  Pt presented with lethargia and decreased urine output. XR Hip: remote R inferior pubic rami fx, WBAT RLE per Dr. Michelle. Dx: sepsis, ARF, HCAP, HLD.   -JJ        Existing Precautions/Restrictions  fall  -JJ        Risks Reviewed  patient:;LOB;dizziness;nausea/vomiting;increased discomfort;change in vital signs;increased drainage;lines disloged  -JJ        Benefits Reviewed  patient:;improve function;increase independence;increase strength;increase balance;decrease pain;decrease risk of DVT;improve skin integrity;increase knowledge  -JJ           Relationship/Environment    Lives With  alone  -JJ           Resource/Environmental Concerns    Current Living Arrangements  independent/assisted living facility  -JJ           Home Main Entrance    Number of Stairs, Main Entrance  none  -JJ           Cognitive Assessment/Interventions    Additional Documentation  Cognitive Assessment/Intervention (Group)  -JJ           Cognitive Assessment/Intervention- PT/OT    Affect/Mental Status (Cognitive)  WFL  -JJ        Orientation Status (Cognition)  oriented x 4  -JJ        Follows Commands (Cognition)  WFL  -JJ        Cognitive Function (Cognitive)  WFL  -JJ        Personal Safety Interventions  fall prevention program maintained;gait belt;muscle strengthening facilitated;nonskid shoes/slippers when out of bed;supervised activity  -JJ           Safety Issues, Functional Mobility    Impairments Affecting Function (Mobility)  balance;endurance/activity tolerance;strength  -JJ           Bed Mobility Assessment/Treatment    Bed Mobility Assessment/Treatment   supine-sit;sit-supine  -        Supine-Sit [place] (Bed Mobility)  contact guard  -        Sit-Supine [place] (Bed Mobility)  contact guard  -        Assistive Device (Bed Mobility)  head of bed elevated  -           Functional Mobility    Functional Mobility- Ind. Level  contact guard assist  -        Functional Mobility- Device  rolling walker  -        Functional Mobility- Safety Issues  supplemental O2  -        Functional Mobility- Comment  bed into unit and back to bed   -           Transfer Assessment/Treatment    Transfer Assessment/Treatment  sit-stand transfer;stand-sit transfer  -           Sit-Stand Transfer    Sit-Stand [place] (Transfers)  contact guard  -           Stand-Sit Transfer    Stand-Sit [place] (Transfers)  contact guard  -           ADL Assessment/Intervention    BADL Assessment/Intervention  lower body dressing  -           Lower Body Dressing Assessment/Training    Lower Body Dressing [place] Level  don;socks;set up;supervision  -        Lower Body Dressing Position  edge of bed sitting  -           BADL Safety/Performance    Impairments, BADL Safety/Performance  balance;endurance/activity tolerance;strength  -        Skilled BADL Treatment/Intervention  [facility] process/adaptation training  -           General ROM    GENERAL ROM COMMENTS  BUE AROM WFL   -           MMT (Manual Muscle Testing)    General MMT Comments  BUE strength 4/5  -           Motor Assessment/Interventions    Additional Documentation  Balance (Group)  -           Balance    Balance  static sitting balance;static standing balance  -           Static Sitting Balance    Level of [place] (Unsupported Sitting, Static Balance)  supervision  -           Static Standing Balance    Level of [place] (Supported Standing, Static Balance)  contact guard assist  -        Assistive Device Utilized (Supported Standing, Static Balance)  walker, rolling  -            Sensory Assessment/Intervention    Sensory General Assessment  no sensation deficits identified  -JJ           Positioning and Restraints    Pre-Treatment Position  in bed  -JJ        Post Treatment Position  bed  -JJ        In Bed  fowlers;notified nsg;call light within reach;encouraged to call for assist;patient within staff view;side rails up x2  -JJ           Pain Assessment    Additional Documentation  Pain Scale: Numbers Pre/Post-Treatment (Group)  -JJ           Pain Scale: Numbers Pre/Post-Treatment    Pain Scale: Numbers, Pretreatment  0/10 - no pain  -JJ        Pain Scale: Numbers, Post-Treatment  0/10 - no pain  -JJ           Plan of Care Review    Plan of Care Reviewed With  patient  -JJ        Outcome Summary  OT eval completed. Pt is A&Ox4. Demo's generalized weaknes, decreased balance and activity tolerance. CGA for all supine <> sit at EOB, functional t/fs and functional mobility with rwx and supplemental O2. Pt required Min A for LB dressing. Pt would benefit from skilled OT services to address these deficits. Recommend d/c home with assist and HH.   -JJ           Clinical Impression (OT)    Date of Referral to OT  08/11/20  -JJ        OT Diagnosis  decreased adls  -JJ        Prognosis (OT Eval)  good  -JJ        Patient/Family Goals Statement (OT Eval)  return home  -J        Criteria for Skilled Therapeutic Interventions Met (OT Eval)  yes;treatment indicated  -JJ        Rehab Potential (OT Eval)  good, to achieve stated therapy goals  -JJ        Therapy Frequency (OT Eval)  5 times/wk  -JJ        Predicted Duration of Therapy Intervention (Therapy Eval)  10 days  -JJ        Care Plan Review (OT)  evaluation/treatment results reviewed;care plan/treatment goals reviewed;risks/benefits reviewed;current/potential barriers reviewed;patient/other agree to care plan  -JJ        Anticipated Discharge Disposition (OT)  home with assist;home with home health  -JJ           Vital Signs    Pre  Systolic BP Rehab  144  -JJ        Pre Treatment Diastolic BP  83  -JJ        Pretreatment Heart Rate (beats/min)  100  -JJ        Pre SpO2 (%)  97  -JJ        O2 Delivery Pre Treatment  supplemental O2  -JJ        Pre Patient Position  Supine  -JJ        Intra Patient Position  Standing  -JJ        Post Patient Position  Supine  -JJ           Planned OT Interventions    Planned Therapy Interventions (OT Eval)  activity tolerance training;BADL retraining;functional balance retraining;occupation/activity based interventions;patient/caregiver education/training;strengthening exercise;transfer/mobility retraining  -JJ           OT Goals    Bathing Goal Selection (OT)  bathing, OT goal 1  -JJ        Toileting Goal Selection (OT)  toileting, OT goal 1  -JJ        Activity Tolerance Goal Selection (OT)  activity tolerance, OT goal 1  -JJ        Additional Documentation  Activity Tolerance Goal Selection (OT) (Row)  -JJ           Bathing Goal 1 (OT)    Activity/Assistive Device (Bathing Goal 1, OT)  bathing skills, all  -JJ        [place] Level/Cues Needed (Bathing Goal 1, OT)  independent  -JJ        Time Frame (Bathing Goal 1, OT)  long term goal (LTG);by discharge  -JJ        Progress/Outcomes (Bathing Goal 1, OT)  goal ongoing  -JJ           Toileting Goal 1 (OT)    Activity/Device (Toileting Goal 1, OT)  toileting skills, all;commode  -JJ        [place] Level/Cues Needed (Toileting Goal 1, OT)  independent  -JJ        Time Frame (Toileting Goal 1, OT)  long term goal (LTG);by discharge  -JJ        Progress/Outcome (Toileting Goal 1, OT)  goal ongoing  -JJ            Activity Tolerance Goal 1 (OT)    Activity Tolerance Goal 1 (OT)  Pt will participate in functional task in standing for 10 minutes to address decreased activity tolerance and increase independence with adls.   -JJ        Activity Level (Endurance Goal 1, OT)  10 min activity;O2 sat >/ equal to 88%  -JJ        Time Frame (Activity Tolerance Goal  1, OT)  long term goal (LTG);by discharge  -        Progress/Outcome (Activity Tolerance Goal 1, OT)  goal ongoing  -           Living Environment    Home Accessibility  wheelchair accessible  -          User Key  (r) = Recorded By, (t) = Taken By, (c) = Cosigned By    Initials Name Effective Dates    Phoebe Rodriguez OTR/L 07/05/20 -          Occupational Therapy Education                 Title: PT OT SLP Therapies (In Progress)     Topic: Occupational Therapy (In Progress)     Point: ADL training (Done)     Description:   Instruct learner(s) on proper safety adaptation and remediation techniques during self care or transfers.   Instruct in proper use of assistive devices.              Learning Progress Summary           Patient Acceptance, E, VU by SHAMIR at 8/13/2020 0906                   Point: Home exercise program (Not Started)     Description:   Instruct learner(s) on appropriate technique for monitoring, assisting and/or progressing therapeutic exercises/activities.              Learner Progress:   Not documented in this visit.          Point: Precautions (Done)     Description:   Instruct learner(s) on prescribed precautions during self-care and functional transfers.              Learning Progress Summary           Patient Acceptance, E, VU by SHAMIR at 8/13/2020 0906                   Point: Body mechanics (Done)     Description:   Instruct learner(s) on proper positioning and spine alignment during self-care, functional mobility activities and/or exercises.              Learning Progress Summary           Patient Acceptance, E, VU by SHAMIR at 8/13/2020 0906                               User Key     Initials Effective Dates Name Provider Type Discipline     07/05/20 -  Phoebe Wade OTR/L Occupational Therapist OT                  OT Recommendation and Plan  Outcome Summary/Treatment Plan (OT)  Anticipated Discharge Disposition (OT): home with assist, home with home health  Planned Therapy  Interventions (OT Eval): activity tolerance training, BADL retraining, functional balance retraining, occupation/activity based interventions, patient/caregiver education/training, strengthening exercise, transfer/mobility retraining  Therapy Frequency (OT Eval): 5 times/wk  Plan of Care Review  Plan of Care Reviewed With: patient  Plan of Care Reviewed With: patient  Outcome Summary: OT eval completed. Pt is A&Ox4. Demo's generalized weaknes, decreased balance and activity tolerance. CGA for all supine <> sit at EOB, functional t/fs and functional mobility with rwx and supplemental O2. Pt required Min A for LB dressing. Pt would benefit from skilled OT services to address these deficits. Recommend d/c home with assist and HH.     Outcome Measures     Row Name 08/13/20 0900             How much help from another is currently needed...    Putting on and taking off regular lower body clothing?  3  -JJ      Bathing (including washing, rinsing, and drying)  3  -JJ      Toileting (which includes using toilet bed pan or urinal)  3  -JJ      Putting on and taking off regular upper body clothing  4  -JJ      Taking care of personal grooming (such as brushing teeth)  4  -JJ      Eating meals  4  -JJ      AM-PAC 6 Clicks Score (OT)  21  -JJ         Functional Assessment    Outcome Measure Options  AM-PAC 6 Clicks Daily Activity (OT)  -        User Key  (r) = Recorded By, (t) = Taken By, (c) = Cosigned By    Initials Name Provider Type    Phoebe Rodriguez, OTR/L Occupational Therapist          Time Calculation:   Time Calculation- OT     Row Name 08/13/20 0905             Time Calculation- OT    OT Start Time  0807 add 12 minutes for chart review and attempted eval on 8/12  -      OT Stop Time  0850  -      OT Time Calculation (min)  43 min  -      OT Received On  08/13/20  -      OT Goal Re-Cert Due Date  08/23/20  -        User Key  (r) = Recorded By, (t) = Taken By, (c) = Cosigned By    Initials Name  Provider Type    Phoebe Rodriguez OTR/L Occupational Therapist        Therapy Charges for Today     Code Description Service Date Service Provider Modifiers Qty    02129006898 HC OT EVAL LOW COMPLEXITY 4 8/13/2020 Phoebe Wade OTR/L GO 1               АННА Blandon  8/13/2020

## 2020-08-13 NOTE — PROGRESS NOTES
Pharmacy Dosing Service  Antimicrobials  Cefepime     Assessment/Action/Plan:  Pharmacy to dose Cefepime for the indication of sepsis; pneumonia. SCr = 1.78 (down from 3.33) Cefepime adjusted to 2000 mg iv q12h (extended infusion). Pharmacy will continue to monitor renal function and adjust dose accordingly.   Current end date: 8/18/2020      Subjective:  Lance Rea is a 60 y.o. male currently being treated for Sepsis; Pneumonia, day 2 of therapy.    Objective:  Estimated Creatinine Clearance: 42.5 mL/min (A) (by C-G formula based on SCr of 1.78 mg/dL (H)).   Lab Results   Component Value Date    CREATININE 1.78 (H) 08/13/2020    CREATININE 3.33 (H) 08/12/2020    CREATININE 4.03 (H) 08/12/2020     Lab Results   Component Value Date    WBC 12.49 (H) 08/13/2020     Temp Readings from Last 1 Encounters:   08/13/20 98.1 °F (36.7 °C) (Oral)       Culture Results:  Microbiology Results (last 10 days)       Procedure Component Value - Date/Time    Respiratory Culture - Sputum, Cough [[id number]] Collected:  08/12/20 1155    Lab Status:  Preliminary result Specimen:  Sputum from Cough Updated:  08/12/20 1250     Gram Stain Greater than 25 WBCs per low power field      Few (2+) Epithelial cells per low power field      Many (4+) Budding yeast    MRSA Screen, PCR (Inpatient) - Swab, Nares [[id number]]  (Normal) Collected:  08/12/20 0924    Lab Status:  Final result Specimen:  Swab from Nares Updated:  08/12/20 1113     MRSA PCR No MRSA Detected    COVID PRE-OP / PRE-PROCEDURE SCREENING ORDER (NO ISOLATION) - Swab, Nasopharynx [[id number]] Collected:  08/12/20 0246    Lab Status:  Final result Specimen:  Swab from Nasopharynx Updated:  08/12/20 0311    Narrative:       The following orders were created for panel order COVID PRE-OP / PRE-PROCEDURE SCREENING ORDER (NO ISOLATION) - Swab, Nasopharynx.  Procedure                               Abnormality         Status                     ---------                                -----------         ------                     COVID-19, [facility] IN-HOUS...[[id number]]  Normal              Final result                 Please view results for these tests on the individual orders.    COVID-19, [facility] IN-HOUSE,NP Swab (NO TRANSPORT MEDIA) 2 HR TAT - Swab, Nasopharynx [[id number]]  (Normal) Collected:  08/12/20 0246    Lab Status:  Final result Specimen:  Swab from Nasopharynx Updated:  08/12/20 0311     COVID19 Not Detected    Narrative:       Fact sheet for providers: https://www.fda.gov/media/852450/download     Fact sheet for patients: https://www.fda.gov/media/512574/download    Blood Culture - Blood, Arm, Left [[id number]] Collected:  08/12/20 0241    Lab Status:  Preliminary result Specimen:  Blood from Arm, Left Updated:  08/13/20 0300     Blood Culture No growth at 24 hours    Blood Culture - Blood, Arm, Right [[id number]] Collected:  08/12/20 0235    Lab Status:  Preliminary result Specimen:  Blood from Arm, Right Updated:  08/13/20 0300     Blood Culture No growth at 24 hours          Current Antimicrobials:   Anti-Infectives (From admission, onward)      Ordered     Dose/Rate Route Frequency Start Stop    08/13/20 0856  cefepime (MAXIPIME) 2 g/100 mL 0.9% NS (mbp)     Ordering Provider:  Pravin Michelle DO    2 g  over 4 Hours Intravenous Every 12 Hours 08/13/20 0900 08/19/20 0944    08/13/20 0347  vancomycin 1250 mg/250 mL 0.9% NS IVPB ([facility])  Review   Note to Pharmacy:  Please dose   Ordering Provider:  Hill Álvarez DO    1,250 mg  over 90 Minutes Intravenous Every 24 Hours 08/13/20 0600 08/16/20 0559    08/12/20 0854  cefepime (MAXIPIME) 2 g/100 mL 0.9% NS (mbp)     Ordering Provider:  Pravin Michelle DO    2 g  200 mL/hr over 30 Minutes Intravenous Once 08/12/20 0945 08/12/20 1258    08/12/20 0124  piperacillin-tazobactam (ZOSYN) 3.375 g/100 mL 0.9% NS IVPB (mbp)     Ordering Provider:  Altaf Shipman MD    3.375 g  over 30 Minutes Intravenous Once 08/12/20 0126 08/12/20  0517    08/12/20 0124  vancomycin 1250 mg/250 mL 0.9% NS IVPB ([facility])     Ordering Provider:  Altaf Shipman MD    20 mg/kg × 63.5 kg  over 90 Minutes Intravenous Once 08/12/20 0126 08/12/20 0658            Tiburcio Encarnacion, CaritoD  08/13/20 08:56

## 2020-08-13 NOTE — PROGRESS NOTES
"Pharmacy Dosing Service  Pharmacokinetics  Vancomycin Follow-up Evaluation    Assessment/Action/Plan:  Vancomycin Random = 8 (~21.5 hours post loading dose). JOSE L dramatically improving (see below). Increased dose from 750 mg every 48 hours to 1,250 mg every 24 hours. Pharmacy will continue to monitor renal function and adjust dose accordingly.    Predictions per [facility] model  Loading dose: none  Regimen: 1250 mg every 24 hours for 4 doses.  Start time: 05:41 on 08/13/2020  Exposure target: AUC24 (range)400-600 mg/L.hr  AUC24,ss: 522 mg/L.hr  PAUC*: 92 %  Ctrough,ss: 13.7 mg/L  Pconc*: 7 %  Tox.: 9 %     Subjective:  Lance Rea is a 60 y.o. male currently on Vancomycin 1,250 mg IV every 24 hours (adjusted from 750mg q48h) for the treatment of Sepsis, Pneumonia, day 2 of therapy (Current vancomycin end date/final dose: 8/15/20 at 0600).    Objective:  Ht: 170.2 cm (67\"); Wt: 66.9 kg (147 lb 7.8 oz)  Estimated Creatinine Clearance: 42.8 mL/min (A) (by C-G formula based on SCr of 1.78 mg/dL (H)).   Lab Results   Component Value Date    CREATININE 1.78 (H) 08/13/2020    CREATININE 3.33 (H) 08/12/2020    CREATININE 4.03 (H) 08/12/2020        CREATININE 4.40 (H) 08/12/2020         Lab Results   Component Value Date    WBC 12.49 (H) 08/13/2020    WBC 28.10 (H) 08/12/2020    WBC 27.25 (H) 08/12/2020         Lab Results   Component Value Date    VANCORANDOM 8.00 08/13/2020       Culture Results:  Microbiology Results (last 10 days)       Procedure Component Value - Date/Time    Respiratory Culture - Sputum, Cough [[id number]] Collected:  08/12/20 1155    Lab Status:  Preliminary result Specimen:  Sputum from Cough Updated:  08/12/20 1250     Gram Stain Greater than 25 WBCs per low power field      Few (2+) Epithelial cells per low power field      Many (4+) Budding yeast    MRSA Screen, PCR (Inpatient) - Swab, Nares [[id number]]  (Normal) Collected:  08/12/20 0924    Lab Status:  Final result Specimen:  Swab " from Nares Updated:  08/12/20 1113     MRSA PCR No MRSA Detected    COVID PRE-OP / PRE-PROCEDURE SCREENING ORDER (NO ISOLATION) - Swab, Nasopharynx [[id number]] Collected:  08/12/20 0246    Lab Status:  Final result Specimen:  Swab from Nasopharynx Updated:  08/12/20 0311    Narrative:       The following orders were created for panel order COVID PRE-OP / PRE-PROCEDURE SCREENING ORDER (NO ISOLATION) - Swab, Nasopharynx.  Procedure                               Abnormality         Status                     ---------                               -----------         ------                     COVID-19, [facility] IN-HOUS...[[id number]]  Normal              Final result                 Please view results for these tests on the individual orders.    COVID-19, [facility] IN-HOUSE,NP Swab (NO TRANSPORT MEDIA) 2 HR TAT - Swab, Nasopharynx [[id number]]  (Normal) Collected:  08/12/20 0246    Lab Status:  Final result Specimen:  Swab from Nasopharynx Updated:  08/12/20 0311     COVID19 Not Detected    Narrative:       Fact sheet for providers: https://www.fda.gov/media/772619/download     Fact sheet for patients: https://www.fda.gov/media/071960/download    Blood Culture - Blood, Arm, Left [[id number]] Collected:  08/12/20 0241    Lab Status:  Preliminary result Specimen:  Blood from Arm, Left Updated:  08/13/20 0300     Blood Culture No growth at 24 hours    Blood Culture - Blood, Arm, Right [[id number]] Collected:  08/12/20 0235    Lab Status:  Preliminary result Specimen:  Blood from Arm, Right Updated:  08/13/20 0300     Blood Culture No growth at 24 hours            Osmani Waite, PharmD   08/13/20 03:48

## 2020-08-13 NOTE — PLAN OF CARE
Problem: Patient Care Overview  Goal: Plan of Care Review  Outcome: Ongoing (interventions implemented as appropriate)  Flowsheets (Taken 8/13/2020 0624)  Progress: no change  Plan of Care Reviewed With: patient  Outcome Summary: Patient rested well through the night. BP low but stable. Adequate urine output using urinal. Bed check on. Anticipate possibly returning to floor today.

## 2020-08-13 NOTE — PROGRESS NOTES
[facility] Medicine Services  INPATIENT PROGRESS NOTE    Patient Name: Lance Rea  Date of Admission: 8/11/2020  Today's Date: 08/13/20  Length of Stay: 1  Primary Care Physician: Reyes Soto MD    Subjective   Chief Complaint: f/u sepsis    HPI   Patient seen and examined.  He continues to feel fatigued but overall feels a little better today.  Clinically he is much improved.  Denies any chest pain or shortness of breath.  Only on 1 L of oxygen.  His blood pressure is much improved today to 110-120s.  Afebrile.  No acute issues or events overnight.      ROS:  All pertinent negatives and positives are as above. All other systems have been reviewed and are negative unless otherwise stated.     Objective    Temp:  [98 °F (36.7 °C)-98.8 °F (37.1 °C)] 98.5 °F (36.9 °C)  Heart Rate:  [] 104  Resp:  [12-20] 15  BP: ()/(34-78) 123/70  Physical Exam  GEN: Awake, alert, interactive, in NAD  HEENT: PERRLA, EOMI, Anicteric, Trachea midline  Lungs: diminished but no wheezing or rales  Heart: RRR, +S1/s2, no rub  ABD: soft, nt/nd, +BS, no guarding/rebound  Extremities:  no cyanosis, no edema  Skin: no rashes or petechiae  Neuro: AAOx3, no focal deficits  Psych: normal mood & affect        Results Review:  I have reviewed the labs, radiology results, and diagnostic studies.    Laboratory Data:   Results from last 7 days   Lab Units 08/13/20  0252 08/12/20  0554 08/12/20  0114   WBC 10*3/mm3 12.49* 28.10* 27.25*   HEMOGLOBIN g/dL 11.1* 11.8* 13.5   HEMATOCRIT % 34.2* 36.1* 40.5   PLATELETS 10*3/mm3 151 183 219        Results from last 7 days   Lab Units 08/13/20  0252 08/12/20  1040 08/12/20  0554 08/12/20  0114   SODIUM mmol/L 142 141 140 140   POTASSIUM mmol/L 4.0 4.0 4.1 4.2   CHLORIDE mmol/L 112* 109* 105 101   CO2 mmol/L 20.0* 19.0* 20.0* 20.0*   BUN mg/dL 21* 30* 31* 32*   CREATININE mg/dL 1.78* 3.33* 4.03* 4.40*   CALCIUM mg/dL 8.1* 7.0* 7.3* 8.5*   BILIRUBIN  mg/dL  --   --  0.4 0.5   ALK PHOS U/L  --   --  96 120*   ALT (SGPT) U/L  --   --  36 48*   AST (SGOT) U/L  --   --  28 38   GLUCOSE mg/dL 106* 119* 113* 127*       Culture Data:   No results found for: BLOODCX, URINECX, WOUNDCX, MRSACX, RESPCX, STOOLCX    Radiology Data:   Imaging Results (Last 24 Hours)     Procedure Component Value Units Date/Time    CT Abdomen Pelvis Without Contrast [[id number]] Collected:  08/12/20 0746     Updated:  08/12/20 0754    Narrative:       CT abdomen pelvis without contrast     Indication: Back pain, nausea, vomiting     Comparison: 4/22/2012     DOSE LENGTH PRODUCT: 183 mGy cm. Automated exposure control was also  utilized to decrease patient radiation dose.     Findings:     LEFT lower lobe consolidation.     Shrunken nodular liver. Prior cholecystectomy. Dilated common bile duct  measuring 12 mm, unchanged from 2012. Spleen is enlarged measuring 13.1  cm craniocaudal dimension. Pancreas and adrenal glands appear  unremarkable.     No urolithiasis or hydronephrosis. No focal urinary bladder wall  thickening. Moderate size hiatal hernia. No abnormal bowel distention or  adjacent inflammation. Normal appendix. No ascites or free pelvic fluid.  No pelvic mass or pelvic collection.      Nonaneurysmal atherosclerotic abdominal aorta. No enlarged abdominal or  pelvic lymph nodes. No acute soft tissue finding. Multilevel lumbar  spine degenerative change and levoscoliotic curvature. No acute osseous  finding.       Impression:       Impression:  1.  LEFT lower lobe consolidation, likely representing pneumonia.  2.  No evidence of bowel obstruction or active bowel inflammation.  3.  Moderate size hiatal hernia.  4.  Liver appears cirrhotic. Mild splenomegaly.     These findings are in agreement with the critical and emergent findings  from the [facility] preliminary report.  This report was finalized on 08/12/2020 07:51 by Dr. Tiburcio Lopez MD.    CT Lower Extremity Right Without Contrast  [[id number]] Collected:  08/12/20 0727     Updated:  08/12/20 0734    Narrative:       CT of the RIGHT hip without contrast     Indication: RIGHT hip pain     Comparison: Same day radiograph     Automated exposure control was also utilized to decrease patient  radiation dose. Dose length product (mGy cm): 93     Findings:     Postoperative change of RIGHT femoral IM nail with dynamic head/neck  screw for transfixion of remote fracture which appears healed. No  evidence of hardware fracture or significant loosening. No acute femoral  head/neck fracture. Remote healed RIGHT inferior pubic rami fracture is  noted. The pubic symphysis is maintained. No acute finding in the  partially imaged pelvis.       Impression:       Impression:  1.  No acute fracture.  2.  Prior RIGHT femoral IM nail without evidence of hardware  complication.     These findings are in agreement with the critical and emergent findings  from the [facility] preliminary report.  This report was finalized on 08/12/2020 07:31 by Dr. Tiburcio Lopez MD.    XR Hip With or Without Pelvis 2 - 3 View Right [[id number]] Collected:  08/12/20 0717     Updated:  08/12/20 0722    Narrative:       XR HIP W OR WO PELVIS 2-3 VIEW RIGHT-     Indication: hip pain; N17.9-Acute kidney failure, unspecified;  A41.9-Sepsis, unspecified organism     Comparison: 9/12/2017     Findings:     No acute fracture or dislocation.   Partially imaged RIGHT femoral IM nail with dynamic head/neck screw.   Pubic symphysis and SI joints are maintained.   Remote RIGHT inferior pubic rami fracture is noted.   A few pelvic phleboliths are present.       Impression:       Impression:     No acute osseous finding.  This report was finalized on 08/12/2020 07:19 by Dr. Tiburcio Lopez MD.          I have reviewed the patient's current medications.     Assessment/Plan     Active Hospital Problems    Diagnosis   • **Sepsis, unspecified organism (CMS/HCC)   • Acute renal failure (CMS/HCC)   • HCAP  (healthcare-associated pneumonia)   • Benign essential HTN   • Hyperlipidemia       #1 severe sepsis -from healthcare associated pneumonia.  Came close yesterday but did not require pressors.  Has remained afebrile.  Blood pressure now above 100.  White count down from 28k to 12.5k.  Lactates normal.  Blood cultures negative x24 hours.  Continue Vanco and cefepime.  Continue IV fluid resuscitation.    #2 Healthcare associated pneumonia -recently at [facility].  Still has pneumonia evident on imaging.  As above continue antibiotics and supportive care.  Follow-up respiratory culture.    #3 acute renal failure -in setting of sepsis and hypotension.  Unclear baseline.  Will check a renal ultrasound.  Improving.  Continue IV fluids.  Encourage p.o.  Good urine output.    #4 hypertension -by history.  Interestingly enough he is on 3 blood pressure meds and Midodrine at home.  Does not make sense.  Pressures are better today.  Continue to hold any medications from home for now and monitor.  Restart as appropriate.    #5 hyperlipidemia -statin    #6 right hip pain -patient had complained of right hip pain on arrival.  Imaging showed no acute right hip fracture.  Showed an old right femoral nail without evidence of hardware complication.  There is also a mention of a remote right inferior pubic rami fracture.  This was discussed with Dr. Lopez and confirmed that this was an old and healed fracture.  Nothing acute.  Plan on PT and OT.  Weightbearing as tolerated.      Discharge Planning: Dispo ongoing.  He is improving.  May be able to move out of the ICU later on today if stays stable.  Plan for PT and OT.    Electronically signed by Pravin Michelle DO, 08/13/20, 07:16.

## 2020-08-13 NOTE — THERAPY EVALUATION
Patient Name: Lance Rea  : 1960    MRN: [id number]                              Today's Date: 2020       Admit Date: 2020    Visit Dx:     ICD-10-CM ICD-9-CM   1. Acute renal failure, unspecified acute renal failure type (CMS/HCC) N17.9 584.9   2. Sepsis, due to unspecified organism, unspecified whether acute organ dysfunction present (CMS/HCC) A41.9 038.9     995.91   3. Decreased activities of daily living (ADL) Z78.9 V49.89   4. Impaired mobility Z74.09 799.89     Patient Active Problem List   Diagnosis   • Cerebral contusion (CMS/HCC)   • Acute renal failure (CMS/HCC)   • Sepsis, unspecified organism (CMS/HCC)   • HCAP (healthcare-associated pneumonia)   • Benign essential HTN   • Hyperlipidemia     Past Medical History:   Diagnosis Date   • Anxiety    • COPD (chronic obstructive pulmonary disease) (CMS/[facility])    • Depression    • Encephalopathy    • GERD (gastroesophageal reflux disease)    • Hepatitis C    • Hyperlipidemia    • Hypertension    • Muscle weakness    • Stroke (CMS/[facility])      Past Surgical History:   Procedure Laterality Date   • CERVICAL SPINE SURGERY     • CHOLECYSTECTOMY     • COLONOSCOPY     • ENDOSCOPY     • FEMUR SURGERY     • REPLACEMENT TOTAL KNEE BILATERAL       General Information     Row Name 2083          PT Evaluation Time/Intention    Document Type  evaluation Pt presented with lethargia and decreased urine output. XR Hip: remote R inferior pubic rami fx, WBAT RLE per Dr. Michelle. Dx: sepsis, ARF, HCAP, HLD  -JANET     Mode of Treatment  physical therapy  -JANET     Row Name 20 6904          General Information    Patient Profile Reviewed?  yes  -JANET     Prior Level of Function  independent:;all household mobility;ADL's;dressing;bathing ambulates with a rollator walker  -JANET     Existing Precautions/Restrictions  fall  -JANET     Barriers to Rehab  medically complex  -JANET     Row Name 20 3006          Relationship/Environment    Lives With  alone  -JANET      Row Name 08/13/20 0830          Resource/Environmental Concerns    Current Living Arrangements  independent/assisted living facility [facility]. Walk in shower  -JANET     Row Name 08/13/20 0830          Home Main Entrance    Number of Stairs, Main Entrance  none  -JANET     Row Name 08/13/20 0830          Cognitive Assessment/Intervention- PT/OT    Orientation Status (Cognition)  oriented x 4  -JANET     Personal Safety Interventions  fall prevention program maintained;gait belt;muscle strengthening facilitated;nonskid shoes/slippers when out of bed  -JANET     Row Name 08/13/20 0830          Safety Issues, Functional Mobility    Impairments Affecting Function (Mobility)  balance;endurance/activity tolerance;strength  -JANET       User Key  (r) = Recorded By, (t) = Taken By, (c) = Cosigned By    Initials Name Provider Type    Micahel Blair PT DPT Physical Therapist        Mobility     Row Name 08/13/20 0830          Bed Mobility Assessment/Treatment    Bed Mobility Assessment/Treatment  supine-sit;sit-supine  -JANET     Supine-Sit [place] (Bed Mobility)  contact guard  -JANET     Sit-Supine [place] (Bed Mobility)  contact guard  -JANET     Assistive Device (Bed Mobility)  head of bed elevated  -JANET     Row Name 08/13/20 0830          Sit-Stand Transfer    Sit-Stand [place] (Transfers)  contact guard  -JANET     Assistive Device (Sit-Stand Transfers)  walker, front-wheeled  -JANET     Row Name 08/13/20 0830          Gait/Stairs Assessment/Training    [place] Level (Gait)  contact guard  -JANET     Assistive Device (Gait)  walker, front-wheeled  -JANET     Distance in Feet (Gait)  60  -JANET     Pattern (Gait)  swing-through  -JANET     Deviations/Abnormal Patterns (Gait)  eliot decreased;gait speed decreased  -JANET       User Key  (r) = Recorded By, (t) = Taken By, (c) = Cosigned By    Initials Name Provider Type    Michael Blair PT DPT Physical Therapist        Obj/Interventions     Row Name 08/13/20 0830           General ROM    GENERAL ROM COMMENTS  B LE AROM WFL  -JANET     Row Name 08/13/20 0830          MMT (Manual Muscle Testing)    General MMT Comments  B LE functionally 4/5  -JANET     Row Name 08/13/20 0830          Static Sitting Balance    Level of [place] (Unsupported Sitting, Static Balance)  supervision  -JANET     Sitting Position (Unsupported Sitting, Static Balance)  sitting on edge of bed  -JANET     Row Name 08/13/20 0830          Dynamic Sitting Balance    Level of [place], Reaches Outside Midline (Sitting, Dynamic Balance)  supervision  -JANET     Sitting Position, Reaches Outside Midline (Sitting, Dynamic Balance)  sitting on edge of bed  -JANET     Row Name 08/13/20 0830          Static Standing Balance    Level of [place] (Supported Standing, Static Balance)  contact guard assist  -JANET     Assistive Device Utilized (Supported Standing, Static Balance)  walker, rolling  -JANET     Row Name 08/13/20 0830          Dynamic Standing Balance    Level of [place], Reaches Outside Midline (Standing, Dynamic Balance)  contact guard assist  -JANET     Assistive Device Utilized (Supported Standing, Dynamic Balance)  walker, rolling  -JANET     Row Name 08/13/20 0830          Sensory Assessment/Intervention    Sensory General Assessment  no sensation deficits identified B LE intact to light touch, per pt  -JANET       User Key  (r) = Recorded By, (t) = Taken By, (c) = Cosigned By    Initials Name Provider Type    Michael Blair, PT DPT Physical Therapist        Goals/Plan     [facility] Name 08/13/20 0830          Bed Mobility Goal 1 (PT)    Activity/Assistive Device (Bed Mobility Goal 1, PT)  sit to supine/supine to sit  -JANET     [place] Level/Cues Needed (Bed Mobility Goal 1, PT)  supervision required  -JANET     Time Frame (Bed Mobility Goal 1, PT)  long term goal (LTG);10 days  -JANET     Progress/Outcomes (Bed Mobility Goal 1, PT)  goal ongoing  -JANET     Row Name 08/13/20 0830          Transfer Goal 1 (PT)     Activity/Assistive Device (Transfer Goal 1, PT)  sit-to-stand/stand-to-sit;bed-to-chair/chair-to-bed;walker, rolling  -JANET     [place] Level/Cues Needed (Transfer Goal 1, PT)  supervision required  -JANET     Time Frame (Transfer Goal 1, PT)  long term goal (LTG);10 days  -JANET     Progress/Outcome (Transfer Goal 1, PT)  goal ongoing  -JANET     Row Name 08/13/20 0830          Gait Training Goal 1 (PT)    Activity/Assistive Device (Gait Training Goal 1, PT)  gait (walking locomotion);assistive device use;decrease fall risk;improve balance and speed;increase endurance/gait distance  -JANET     [place] Level (Gait Training Goal 1, PT)  supervision required  -JANET     Distance (Gait Goal 1, PT)  200  -JANET     Time Frame (Gait Training Goal 1, PT)  long term goal (LTG);10 days  -JANET     Progress/Outcome (Gait Training Goal 1, PT)  goal ongoing  -JANET       User Key  (r) = Recorded By, (t) = Taken By, (c) = Cosigned By    Initials Name Provider Type    Michael Blair, PT DPT Physical Therapist        Clinical Impression     Row Name 08/13/20 0830          Pain Assessment    Additional Documentation  Pain Scale: Numbers Pre/Post-Treatment (Group)  -JANET     Row Name 08/13/20 0830          Pain Scale: Numbers Pre/Post-Treatment    Pain Scale: Numbers, Pretreatment  0/10 - no pain  -JANET     Pre/Post Treatment Pain Comment  c/o generalized ache  -JANET     [facility] Name 08/13/20 0830          Plan of Care Review    Plan of Care Reviewed With  patient  -JANET     Row Name 08/13/20 0830          Physical Therapy Clinical Impression    Patient/Family Goals Statement (PT Clinical Impression)   go home  -JANET     Criteria for Skilled Interventions Met (PT Clinical Impression)  yes;treatment indicated  -JANET     Rehab Potential (PT Clinical Summary)  good, to achieve stated therapy goals  -JANET     Predicted Duration of Therapy (PT)  until d/c  -JANET     [facility] Name 08/13/20 0830          Vital Signs    Pre Systolic BP Rehab  144  -JANET     Pre Treatment  Diastolic BP  83  -JANET     Pretreatment Heart Rate (beats/min)  100  -JANET     Pre SpO2 (%)  97  -JANET     O2 Delivery Pre Treatment  supplemental O2  -JANET     Pre Patient Position  Supine  -JANET     Row Name 08/13/20 0830          Positioning and Restraints    Pre-Treatment Position  in bed  -JANET     Post Treatment Position  bed  -JANET     In Bed  fowlers;call light within reach;encouraged to call for assist  -JANET       User Key  (r) = Recorded By, (t) = Taken By, (c) = Cosigned By    Initials Name Provider Type    Michael Blair PT DPT Physical Therapist        Outcome Measures     Row Name 08/13/20 0830          How much help from another person do you currently need...    Turning from your back to your side while in flat bed without using bedrails?  3  -JANET     Moving from lying on back to sitting on the side of a flat bed without bedrails?  3  -JANET     Moving to and from a bed to a chair (including a wheelchair)?  3  -JANET     Standing up from a chair using your arms (e.g., wheelchair, bedside chair)?  3  -JANET     Climbing 3-5 steps with a railing?  3  -JANET     To walk in hospital room?  3  -JANET     AM-PAC 6 Clicks Score (PT)  18  -JANET     Row Name 08/13/20 0830          Functional Assessment    Outcome Measure Options  AM-PAC 6 Clicks Basic Mobility (PT)  -JANET       User Key  (r) = Recorded By, (t) = Taken By, (c) = Cosigned By    Initials Name Provider Type    Michael Blair PT DPT Physical Therapist        Physical Therapy Education                 Title: PT OT SLP Therapies (In Progress)     Topic: Physical Therapy (In Progress)     Point: Mobility training (Done)     Description:   Instruct learner(s) on safety and technique for assisting patient out of bed, chair or wheelchair.  Instruct in the proper use of assistive devices, such as walker, crutches, cane or brace.              Patient Friendly Description:   It's important to get you on your feet again, but we need to do so in a way that is safe for you.  Falling has serious consequences, and your personal safety is the most important thing of all.        When it's time to get out of bed, one of us or a family member will sit next to you on the bed to give you support.     If your doctor or nurse tells you to use a walker, crutches, a cane, or a brace, be sure you use it every time you get out of bed, even if you think you don't need it.    Learning Progress Summary           Patient Acceptance, E, LINDA,DU by JANET at 8/13/2020 0876    Comment:  Educated pt on progression of PT POC and benefits of activity                   Point: Home exercise program (Not Started)     Description:   Instruct learner(s) on appropriate technique for monitoring, assisting and/or progressing patient with therapeutic exercises and activities.              Learner Progress:   Not documented in this visit.          Point: Body mechanics (Not Started)     Description:   Instruct learner(s) on proper positioning and spine alignment for patient and/or caregiver during mobility tasks and/or exercises.              Learner Progress:   Not documented in this visit.          Point: Precautions (Not Started)     Description:   Instruct learner(s) on prescribed precautions during mobility and gait tasks              Learner Progress:   Not documented in this visit.                      User Key     Initials Effective Dates Name Provider Type Discipline    JANET 08/02/16 -  Michael Houser, PT DPT Physical Therapist PT              PT Recommendation and Plan  Planned Therapy Interventions (PT Eval): bed mobility training, transfer training, gait training, balance training, home exercise program, patient/family education, postural re-education, stair training, strengthening  Outcome Summary/Treatment Plan (PT)  Anticipated Discharge Disposition (PT): assisted living facility ([facility]), home with home health  Plan of Care Reviewed With: patient  Outcome Summary: PT eval completed. He presents alert and  oriented x 4. He was CGA to stand and ambulate with a RW. He ambulated ~ 60 ft and demos a slow pace and generalized weakness with activity. PT will cont to progress gait, balance, strength and endurance. I anticiapte he will d.c back to [facility] with HH.     Time Calculation:   PT Charges     Row Name 08/13/20 0935             Time Calculation    Start Time  0830  -JANET      Stop Time  0915 + 15 minutes with chart review. PT with 60 total minutes.   -JANET      Time Calculation (min)  45 min  -JANET      PT Received On  08/13/20  -JANET      PT Goal Re-Cert Due Date  08/23/20  -JANET        User Key  (r) = Recorded By, (t) = Taken By, (c) = Cosigned By    Initials Name Provider Type    Michael Blair, PT DPT Physical Therapist        Therapy Charges for Today     Code Description Service Date Service Provider Modifiers Qty    37104212085 HC PT EVAL MOD COMPLEXITY 4 8/13/2020 Michael Houser PT DPT GP 1          PT G-Codes  Outcome Measure Options: AM-PAC 6 Clicks Daily Activity (OT)  AM-PAC 6 Clicks Score (PT): 18  AM-PAC 6 Clicks Score (OT): 21    Michael Houser, BETH DPT  8/13/2020

## 2020-08-13 NOTE — PLAN OF CARE
Problem: Patient Care Overview  Goal: Plan of Care Review  Flowsheets  Taken 8/13/2020 0907  Plan of Care Reviewed With: patient  Taken 8/13/2020 0807  Outcome Summary: OT eval completed. Pt is A&Ox4. Demo's generalized weaknes, decreased balance and activity tolerance. CGA for all supine <> sit at EOB, functional t/fs and functional mobility with rwx and supplemental O2. Pt required Min A for LB dressing. Pt would benefit from skilled OT services to address these deficits. Recommend d/c home with assist and HH.

## 2020-08-13 NOTE — PLAN OF CARE
Problem: Patient Care Overview  Goal: Plan of Care Review  Outcome: Ongoing (interventions implemented as appropriate)  Flowsheets (Taken 8/13/2020 1614)  Outcome Summary: VSS, c/o generalized pain that does ease some with tylenol, adequate urine output, continue to monitor

## 2020-08-13 NOTE — PLAN OF CARE
Problem: Patient Care Overview  Goal: Plan of Care Review  Flowsheets (Taken 8/13/2020 0173)  Outcome Summary: PT eval completed. He presents alert and oriented x 4. He was CGA to stand and ambulate with a RW. He ambulated ~ 60 ft and demos a slow pace and generalized weakness with activity. PT will cont to progress gait, balance, strength and endurance. I anticiapte he will d.c back to [facility] with HH.

## 2020-08-14 LAB
ANION GAP SERPL CALCULATED.3IONS-SCNC: 11 MMOL/L (ref 5–15)
BACTERIA SPEC RESP CULT: ABNORMAL
BASOPHILS # BLD AUTO: 0.02 10*3/MM3 (ref 0–0.2)
BASOPHILS NFR BLD AUTO: 0.2 % (ref 0–1.5)
BUN SERPL-MCNC: 16 MG/DL (ref 6–20)
BUN/CREAT SERPL: 18.2 (ref 7–25)
CALCIUM SPEC-SCNC: 8.5 MG/DL (ref 8.6–10.5)
CHLORIDE SERPL-SCNC: 111 MMOL/L (ref 98–107)
CO2 SERPL-SCNC: 21 MMOL/L (ref 22–29)
CREAT SERPL-MCNC: 0.88 MG/DL (ref 0.76–1.27)
DEPRECATED RDW RBC AUTO: 45.8 FL (ref 37–54)
EOSINOPHIL # BLD AUTO: 0.14 10*3/MM3 (ref 0–0.4)
EOSINOPHIL NFR BLD AUTO: 1.7 % (ref 0.3–6.2)
ERYTHROCYTE [DISTWIDTH] IN BLOOD BY AUTOMATED COUNT: 14.6 % (ref 12.3–15.4)
GFR SERPL CREATININE-BSD FRML MDRD: 89 ML/MIN/1.73
GLUCOSE SERPL-MCNC: 96 MG/DL (ref 65–99)
GRAM STN SPEC: ABNORMAL
HCT VFR BLD AUTO: 34.3 % (ref 37.5–51)
HGB BLD-MCNC: 11.3 G/DL (ref 13–17.7)
IMM GRANULOCYTES # BLD AUTO: 0.04 10*3/MM3 (ref 0–0.05)
IMM GRANULOCYTES NFR BLD AUTO: 0.5 % (ref 0–0.5)
LYMPHOCYTES # BLD AUTO: 1.48 10*3/MM3 (ref 0.7–3.1)
LYMPHOCYTES NFR BLD AUTO: 17.9 % (ref 19.6–45.3)
MCH RBC QN AUTO: 28.3 PG (ref 26.6–33)
MCHC RBC AUTO-ENTMCNC: 32.9 G/DL (ref 31.5–35.7)
MCV RBC AUTO: 85.8 FL (ref 79–97)
MONOCYTES # BLD AUTO: 0.55 10*3/MM3 (ref 0.1–0.9)
MONOCYTES NFR BLD AUTO: 6.7 % (ref 5–12)
NEUTROPHILS NFR BLD AUTO: 6.03 10*3/MM3 (ref 1.7–7)
NEUTROPHILS NFR BLD AUTO: 73 % (ref 42.7–76)
NRBC BLD AUTO-RTO: 0 /100 WBC (ref 0–0.2)
PLATELET # BLD AUTO: 165 10*3/MM3 (ref 140–450)
PMV BLD AUTO: 10.4 FL (ref 6–12)
POTASSIUM SERPL-SCNC: 4.2 MMOL/L (ref 3.5–5.2)
RBC # BLD AUTO: 4 10*6/MM3 (ref 4.14–5.8)
SODIUM SERPL-SCNC: 143 MMOL/L (ref 136–145)
WBC # BLD AUTO: 8.26 10*3/MM3 (ref 3.4–10.8)

## 2020-08-14 PROCEDURE — 97110 THERAPEUTIC EXERCISES: CPT

## 2020-08-14 PROCEDURE — 25010000002 CEFEPIME PER 500 MG: Performed by: INTERNAL MEDICINE

## 2020-08-14 PROCEDURE — 94799 UNLISTED PULMONARY SVC/PX: CPT

## 2020-08-14 PROCEDURE — 97116 GAIT TRAINING THERAPY: CPT

## 2020-08-14 PROCEDURE — 25010000002 VANCOMYCIN 10 G RECONSTITUTED SOLUTION: Performed by: INTERNAL MEDICINE

## 2020-08-14 PROCEDURE — 80048 BASIC METABOLIC PNL TOTAL CA: CPT | Performed by: INTERNAL MEDICINE

## 2020-08-14 PROCEDURE — 85025 COMPLETE CBC W/AUTO DIFF WBC: CPT | Performed by: INTERNAL MEDICINE

## 2020-08-14 RX ADMIN — METOPROLOL TARTRATE 25 MG: 25 TABLET, FILM COATED ORAL at 20:37

## 2020-08-14 RX ADMIN — ASPIRIN 81 MG 81 MG: 81 TABLET ORAL at 09:22

## 2020-08-14 RX ADMIN — IPRATROPIUM BROMIDE AND ALBUTEROL SULFATE 3 ML: 2.5; .5 SOLUTION RESPIRATORY (INHALATION) at 06:15

## 2020-08-14 RX ADMIN — CEFEPIME HYDROCHLORIDE 2 G: 2 INJECTION, POWDER, FOR SOLUTION INTRAVENOUS at 09:22

## 2020-08-14 RX ADMIN — GUAIFENESIN 1200 MG: 600 TABLET, EXTENDED RELEASE ORAL at 09:22

## 2020-08-14 RX ADMIN — SERTRALINE 150 MG: 100 TABLET, FILM COATED ORAL at 20:37

## 2020-08-14 RX ADMIN — CLONAZEPAM 0.5 MG: 0.5 TABLET ORAL at 20:37

## 2020-08-14 RX ADMIN — METOPROLOL TARTRATE 25 MG: 25 TABLET, FILM COATED ORAL at 12:56

## 2020-08-14 RX ADMIN — VANCOMYCIN HYDROCHLORIDE 1250 MG: 10 INJECTION, POWDER, LYOPHILIZED, FOR SOLUTION INTRAVENOUS at 05:25

## 2020-08-14 RX ADMIN — PANTOPRAZOLE SODIUM 40 MG: 40 TABLET, DELAYED RELEASE ORAL at 09:23

## 2020-08-14 RX ADMIN — GUAIFENESIN 1200 MG: 600 TABLET, EXTENDED RELEASE ORAL at 20:37

## 2020-08-14 RX ADMIN — CEFEPIME HYDROCHLORIDE 2 G: 2 INJECTION, POWDER, FOR SOLUTION INTRAVENOUS at 17:41

## 2020-08-14 RX ADMIN — ATORVASTATIN CALCIUM 10 MG: 10 TABLET, FILM COATED ORAL at 20:37

## 2020-08-14 RX ADMIN — SODIUM CHLORIDE 100 ML/HR: 9 INJECTION, SOLUTION INTRAVENOUS at 02:30

## 2020-08-14 RX ADMIN — IPRATROPIUM BROMIDE AND ALBUTEROL SULFATE 3 ML: 2.5; .5 SOLUTION RESPIRATORY (INHALATION) at 13:32

## 2020-08-14 RX ADMIN — CLONAZEPAM 0.5 MG: 0.5 TABLET ORAL at 09:23

## 2020-08-14 RX ADMIN — IPRATROPIUM BROMIDE AND ALBUTEROL SULFATE 3 ML: 2.5; .5 SOLUTION RESPIRATORY (INHALATION) at 10:10

## 2020-08-14 RX ADMIN — SODIUM CHLORIDE, PRESERVATIVE FREE 10 ML: 5 INJECTION INTRAVENOUS at 20:38

## 2020-08-14 RX ADMIN — IPRATROPIUM BROMIDE AND ALBUTEROL SULFATE 3 ML: 2.5; .5 SOLUTION RESPIRATORY (INHALATION) at 20:29

## 2020-08-14 NOTE — PROGRESS NOTES
[facility] Medicine Services  INPATIENT PROGRESS NOTE    Patient Name: Lance Rea  Date of Admission: 8/11/2020  Today's Date: 08/14/20  Length of Stay: 2  Primary Care Physician: Reyes Soto MD    Subjective   Chief Complaint: f/u sepsis    HPI:  Patient seen and examined.  He is feeling better.  Denies any shortness of breath or chest pain.  No nausea or vomiting.  Tolerating p.o.  Currently on room air.  Blood pressure doing well and up into the 150s today.    ROS:  All pertinent negatives and positives are as above. All other systems have been reviewed and are negative unless otherwise stated.     Objective    Temp:  [98.3 °F (36.8 °C)-99.6 °F (37.6 °C)] 98.3 °F (36.8 °C)  Heart Rate:  [] 98  Resp:  [10-18] 16  BP: (113-165)/(64-92) 156/77  Physical Exam  GEN: Awake, alert, interactive, in NAD, no respiratory distress, speaking in full sentences  HEENT: PERRLA, EOMI, Anicteric, Trachea midline  Lungs: diminished but no wheezing or rales  Heart: RRR, +S1/s2, no rub  ABD: soft, nt/nd, +BS, no guarding/rebound  Extremities:  no cyanosis, no edema  Skin: no rashes or petechiae  Neuro: AAOx3, no focal deficits  Psych: normal mood & affect        Results Review:  I have reviewed the labs, radiology results, and diagnostic studies.    Laboratory Data:   Results from last 7 days   Lab Units 08/14/20  0420 08/13/20  0252 08/12/20  0554   WBC 10*3/mm3 8.26 12.49* 28.10*   HEMOGLOBIN g/dL 11.3* 11.1* 11.8*   HEMATOCRIT % 34.3* 34.2* 36.1*   PLATELETS 10*3/mm3 165 151 183        Results from last 7 days   Lab Units 08/14/20  0420 08/13/20  0252 08/12/20  1040 08/12/20  0554 08/12/20  0114   SODIUM mmol/L 143 142 141 140 140   POTASSIUM mmol/L 4.2 4.0 4.0 4.1 4.2   CHLORIDE mmol/L 111* 112* 109* 105 101   CO2 mmol/L 21.0* 20.0* 19.0* 20.0* 20.0*   BUN mg/dL 16 21* 30* 31* 32*   CREATININE mg/dL 0.88 1.78* 3.33* 4.03* 4.40*   CALCIUM mg/dL 8.5* 8.1* 7.0* 7.3* 8.5*      BILIRUBIN mg/dL  --   --   --  0.4 0.5   ALK PHOS U/L  --   --   --  96 120*   ALT (SGPT) U/L  --   --   --  36 48*   AST (SGOT) U/L  --   --   --  28 38   GLUCOSE mg/dL 96 106* 119* 113* 127*       Culture Data:   No results found for: BLOODCX, URINECX, WOUNDCX, MRSACX, RESPCX, STOOLCX    Radiology Data:   Imaging Results (Last 24 Hours)     ** No results found for the last 24 hours. **          I have reviewed the patient's current medications.     Assessment/Plan     Active Hospital Problems    Diagnosis   • **Sepsis, unspecified organism (CMS/HCC)   • Acute renal failure (CMS/HCC)   • HCAP (healthcare-associated pneumonia)   • Benign essential HTN   • Hyperlipidemia       #1 severe sepsis -from healthcare associated pneumonia.  Improving here on Vanco and cefepime.  Afebrile now and white count has resolved from almost 30 K on arrival.  Sputum now growing Klebsiella good sensitivities.  If continues to well clinically stopping tomorrow and de-escalate antibiotics.  Blood culture so far negative.    #2 Healthcare associated pneumonia -recently at [facility].  Still has pneumonia evident on imaging.  As above continue antibiotics and supportive care.     #3 acute renal failure -in setting of sepsis and hypotension.  Creatinine 4.4 on arrival is now normalized today with treatment of sepsis and hypotension over the last 48 hours.  Now 0.88 with a GFR of 89.  Can stop IV fluids and encourage p.o. intake.  Recheck tomorrow.    #4 hypertension -by history.  Interestingly enough he is on 3 blood pressure meds and Midodrine at home.  Was hypotensive on arrival but after sepsis treatment has improved rapidly.  Now blood pressure dropped to the 150s and 160s.  Will start back low-dose metoprolol alone and monitor.    #5 hyperlipidemia -statin    #6 right hip pain -patient had complained of right hip pain on arrival.  Imaging showed no acute right hip fracture.  Showed an old right femoral nail without evidence of  hardware complication.  There is also a mention of a remote right inferior pubic rami fracture.  This was discussed with Dr. Lopez and confirmed that this was an old and healed fracture.  Nothing acute.  Plan on PT and OT.  Weightbearing as tolerated.      Discharge Planning: Continue therapies here.  Plan for discharge to either [facility] or with [facility] and home health services in the next 48 hours potentially.    Electronically signed by Pravin Michelle DO, 08/14/20, 09:39.

## 2020-08-14 NOTE — PLAN OF CARE
Problem: Patient Care Overview  Goal: Plan of Care Review  Outcome: Ongoing (interventions implemented as appropriate)  Note:   VSS, no c/o of pain; IV fluids and abx continue; oxygen sats in 90s on rm air; good, nonproductive cough; assist x1 to the bathroom; sinus/ sinus tach  on tele; safety maintained, will continue to monitor     Problem: Fall Risk (Adult)  Goal: Absence of Fall  Outcome: Ongoing (interventions implemented as appropriate)     Problem: Pain, Chronic (Adult)  Goal: Acceptable Pain/Comfort Level and Functional Ability  Outcome: Ongoing (interventions implemented as appropriate)     Problem: Patient Care Overview  Goal: Individualization and Mutuality  Outcome: Ongoing (interventions implemented as appropriate)     Problem: Patient Care Overview  Goal: Discharge Needs Assessment  Outcome: Ongoing (interventions implemented as appropriate)     Problem: Patient Care Overview  Goal: Interprofessional Rounds/Family Conf  Outcome: Ongoing (interventions implemented as appropriate)     Problem: Renal Failure/Kidney Injury, Acute (Adult)  Goal: Signs and Symptoms of Listed Potential Problems Will be Absent, Minimized or Managed (Renal Failure/Kidney Injury, Acute)  Outcome: Ongoing (interventions implemented as appropriate)

## 2020-08-14 NOTE — PROGRESS NOTES
Continued Stay Note  [facility]     Patient Name: Lance Rea  MRN: [id number]  Today's Date: 8/14/2020    Admit Date: 8/11/2020    Discharge Plan     Row Name 08/14/20 0855       Plan    Plan  [facility] [place]    Patient/Family in Agreement with Plan  yes    Plan Comments  Pt lives at [facility] in [facility].  Pt prefers living with dtr at d/c but can't get through her phone, Alesha [phone]( line always busy). Will follow.         Discharge Codes    No documentation.             CHANCE Waldron

## 2020-08-14 NOTE — PLAN OF CARE
Problem: Patient Care Overview  Goal: Plan of Care Review  Outcome: Ongoing (interventions implemented as appropriate)  Flowsheets (Taken 8/14/2020 1412)  Progress: improving  Plan of Care Reviewed With: patient  Note:   Pt. Refuses adl tx, ue exs to increase his act. Eddy with adl tasks!

## 2020-08-14 NOTE — PLAN OF CARE
Problem: Patient Care Overview  Goal: Plan of Care Review  Flowsheets (Taken 8/14/2020 0954)  Progress: improving  Plan of Care Reviewed With: patient  Outcome Summary: He was S supine to sit and CGA to stand. Pt was able to  ambulate with a RW 150ft SBA-CGA. He ambulated ~ 150 ft and demos a slow pace and generalized weakness with activity. PT will cont to progress gait, balance, strength and endurance. Pt states he want to stay with daughter when discharged and that she has steps to get into her home.

## 2020-08-14 NOTE — PAYOR COMM NOTE
"Katy Rea (60 y.o. Male) [id number]   Please review clinical  Pt remains in [facility] phone    Fax        Note this pt  8/12 CCU   Moved back floor 8/13      Date of Birth Social Security Number Address Home Phone MRN    1960  [place] [address] [phone] [id number]    Adventism Marital Status          [facility] Single       Admission Date Admission Type Admitting Provider Attending Provider Department, Room/Bed    8/11/20 Emergency Pravin Michelle, Pravin Acevedo,  [facility] 4C, 461/1    Discharge Date Discharge Disposition Discharge Destination                       Attending Provider:  Pravin Michelle DO    Allergies:  Codeine    Isolation:  None   Infection:  None   Code Status:  CPR    Ht:  170.2 cm (67\")   Wt:  66.9 kg (147 lb 8 oz)    Admission Cmt:  None   Principal Problem:  Sepsis, unspecified organism (CMS/[facility]) [A41.9]                 Active Insurance as of 8/11/2020     Primary Coverage     Payor Plan Insurance Group Employer/Plan Group    ANTHEM MEDICAID ANTHEM MEDICAID [id number]     Payor Plan Address Payor Plan Phone Number Payor Plan Fax Number Effective Dates    [address] [address] [phone]  4/2/2020 - None Entered    [facility] [id number]       Subscriber Name Subscriber Birth Date Member ID       KATY REA 1960 [id number]                 Emergency Contacts      (Rel.) Home Phone Work Phone Mobile Phone    Tea Lux (Daughter) [phone] -- --              Current Facility-Administered Medications   Medication Dose Route Frequency Provider Last Rate Last Dose   • acetaminophen (TYLENOL) tablet 650 mg  650 mg Oral Q6H PRN Pravin Michelle DO   650 mg at 08/13/20 2005   • aspirin chewable tablet 81 mg  81 mg Oral Daily Pravin Michelle DO   81 mg at 08/13/20 0801   • atorvastatin (LIPITOR) tablet 10 mg  10 mg Oral Nightly Pravin Michelle DO   10 mg " at 08/13/20 2050   • cefepime (MAXIPIME) 2 g/100 mL 0.9% NS (mbp)  2 g Intravenous Q12H Pravin Michelle DO   2 g at 08/13/20 2051   • clonazePAM (KlonoPIN) tablet 0.5 mg  0.5 mg Oral BID Pravin Michelle DO   0.5 mg at 08/13/20 2005   • diphenhydrAMINE (BENADRYL) capsule 50 mg  50 mg Oral Q6H PRN Pravin Michelle DO       • docusate sodium (COLACE) capsule 100 mg  100 mg Oral BID PRN Pravin Michelle DO       • guaiFENesin (MUCINEX) 12 hr tablet 1,200 mg  1,200 mg Oral Q12H Pravin Michelle DO   1,200 mg at 08/13/20 2050   • ipratropium-albuterol (DUO-NEB) nebulizer solution 3 mL  3 mL Nebulization 4x Daily - RT Pravin Michelle DO   3 mL at 08/14/20 0615   • ondansetron (ZOFRAN) tablet 4 mg  4 mg Oral Q8H PRN Pravin Michelle DO       • pantoprazole (PROTONIX) EC tablet 40 mg  40 mg Oral Daily Pravin Michelle DO   40 mg at 08/13/20 0801   • Pharmacy Consult - Pharmacy to dose   Does not apply Continuous PRN Pravin Michelle DO       • sertraline (ZOLOFT) tablet 150 mg  150 mg Oral Nightly Pravin Michelle DO   150 mg at 08/13/20 2050   • sodium chloride 0.9 % flush 10 mL  10 mL Intravenous Q12H Pravin Michelle DO   10 mL at 08/13/20 2005   • sodium chloride 0.9 % flush 10 mL  10 mL Intravenous PRN Pravin Michelle DO       • sodium chloride 0.9 % infusion  100 mL/hr Intravenous Continuous Pravin Michelle  mL/hr at 08/14/20 0230 100 mL/hr at 08/14/20 0230   • vancomycin 1250 mg/250 mL 0.9% NS IVPB ([facility])  1,250 mg Intravenous Q24H Pravin Michelle DO   1,250 mg at 08/14/20 0525        Physician Progress Notes (last 72 hours) (Notes from 08/11/20 0801 through 08/14/20 0801)      Pravin Michelle DO at 08/13/20 0716              [facility] Medicine Services  INPATIENT PROGRESS NOTE    Patient Name: Lance Rea  Date of Admission: 8/11/2020  Today's Date: 08/13/20  Length of Stay: 1  Primary Care Physician: Reyes Soto MD    Subjective   Chief  Complaint: f/u sepsis    HPI   Patient seen and examined.  He continues to feel fatigued but overall feels a little better today.  Clinically he is much improved.  Denies any chest pain or shortness of breath.  Only on 1 L of oxygen.  His blood pressure is much improved today to 110-120s.  Afebrile.  No acute issues or events overnight.      ROS:  All pertinent negatives and positives are as above. All other systems have been reviewed and are negative unless otherwise stated.     Objective    Temp:  [98 °F (36.7 °C)-98.8 °F (37.1 °C)] 98.5 °F (36.9 °C)  Heart Rate:  [] 104  Resp:  [12-20] 15  BP: ()/(34-78) 123/70  Physical Exam  GEN: Awake, alert, interactive, in NAD  HEENT: PERRLA, EOMI, Anicteric, Trachea midline  Lungs: diminished but no wheezing or rales  Heart: RRR, +S1/s2, no rub  ABD: soft, nt/nd, +BS, no guarding/rebound  Extremities:  no cyanosis, no edema  Skin: no rashes or petechiae  Neuro: AAOx3, no focal deficits  Psych: normal mood & affect        Results Review:  I have reviewed the labs, radiology results, and diagnostic studies.    Laboratory Data:   Results from last 7 days   Lab Units 08/13/20  0252 08/12/20  0554 08/12/20  0114   WBC 10*3/mm3 12.49* 28.10* 27.25*   HEMOGLOBIN g/dL 11.1* 11.8* 13.5   HEMATOCRIT % 34.2* 36.1* 40.5   PLATELETS 10*3/mm3 151 183 219        Results from last 7 days   Lab Units 08/13/20  0252 08/12/20  1040 08/12/20  0554 08/12/20  0114   SODIUM mmol/L 142 141 140 140   POTASSIUM mmol/L 4.0 4.0 4.1 4.2   CHLORIDE mmol/L 112* 109* 105 101   CO2 mmol/L 20.0* 19.0* 20.0* 20.0*   BUN mg/dL 21* 30* 31* 32*   CREATININE mg/dL 1.78* 3.33* 4.03* 4.40*   CALCIUM mg/dL 8.1* 7.0* 7.3* 8.5*   BILIRUBIN mg/dL  --   --  0.4 0.5   ALK PHOS U/L  --   --  96 120*   ALT (SGPT) U/L  --   --  36 48*   AST (SGOT) U/L  --   --  28 38   GLUCOSE mg/dL 106* 119* 113* 127*       Culture Data:   No results found for: BLOODCX, URINECX, WOUNDCX, MRSACX, RESPCX, STOOLCX    Radiology  Data:   Imaging Results (Last 24 Hours)     Procedure Component Value Units Date/Time    CT Abdomen Pelvis Without Contrast [[id number]] Collected:  08/12/20 0746     Updated:  08/12/20 0754    Narrative:       CT abdomen pelvis without contrast     Indication: Back pain, nausea, vomiting     Comparison: 4/22/2012     DOSE LENGTH PRODUCT: 183 mGy cm. Automated exposure control was also  utilized to decrease patient radiation dose.     Findings:     LEFT lower lobe consolidation.     Shrunken nodular liver. Prior cholecystectomy. Dilated common bile duct  measuring 12 mm, unchanged from 2012. Spleen is enlarged measuring 13.1  cm craniocaudal dimension. Pancreas and adrenal glands appear  unremarkable.     No urolithiasis or hydronephrosis. No focal urinary bladder wall  thickening. Moderate size hiatal hernia. No abnormal bowel distention or  adjacent inflammation. Normal appendix. No ascites or free pelvic fluid.  No pelvic mass or pelvic collection.      Nonaneurysmal atherosclerotic abdominal aorta. No enlarged abdominal or  pelvic lymph nodes. No acute soft tissue finding. Multilevel lumbar  spine degenerative change and levoscoliotic curvature. No acute osseous  finding.       Impression:       Impression:  1.  LEFT lower lobe consolidation, likely representing pneumonia.  2.  No evidence of bowel obstruction or active bowel inflammation.  3.  Moderate size hiatal hernia.  4.  Liver appears cirrhotic. Mild splenomegaly.     These findings are in agreement with the critical and emergent findings  from the [facility] preliminary report.  This report was finalized on 08/12/2020 07:51 by Dr. Tiburcio Lopez MD.    CT Lower Extremity Right Without Contrast [[id number]] Collected:  08/12/20 0727     Updated:  08/12/20 0734    Narrative:       CT of the RIGHT hip without contrast     Indication: RIGHT hip pain     Comparison: Same day radiograph     Automated exposure control was also utilized to decrease patient  radiation  dose. Dose length product (mGy cm): 93     Findings:     Postoperative change of RIGHT femoral IM nail with dynamic head/neck  screw for transfixion of remote fracture which appears healed. No  evidence of hardware fracture or significant loosening. No acute femoral  head/neck fracture. Remote healed RIGHT inferior pubic rami fracture is  noted. The pubic symphysis is maintained. No acute finding in the  partially imaged pelvis.       Impression:       Impression:  1.  No acute fracture.  2.  Prior RIGHT femoral IM nail without evidence of hardware  complication.     These findings are in agreement with the critical and emergent findings  from the [facility] preliminary report.  This report was finalized on 08/12/2020 07:31 by Dr. Tiburcio Lopez MD.    XR Hip With or Without Pelvis 2 - 3 View Right [[id number]] Collected:  08/12/20 0717     Updated:  08/12/20 0722    Narrative:       XR HIP W OR WO PELVIS 2-3 VIEW RIGHT-     Indication: hip pain; N17.9-Acute kidney failure, unspecified;  A41.9-Sepsis, unspecified organism     Comparison: 9/12/2017     Findings:     No acute fracture or dislocation.   Partially imaged RIGHT femoral IM nail with dynamic head/neck screw.   Pubic symphysis and SI joints are maintained.   Remote RIGHT inferior pubic rami fracture is noted.   A few pelvic phleboliths are present.       Impression:       Impression:     No acute osseous finding.  This report was finalized on 08/12/2020 07:19 by Dr. Tiburcio Lopez MD.          I have reviewed the patient's current medications.     Assessment/Plan     Active Hospital Problems    Diagnosis   • **Sepsis, unspecified organism (CMS/[facility])   • Acute renal failure (CMS/HCC)   • HCAP (healthcare-associated pneumonia)   • Benign essential HTN   • Hyperlipidemia       #1 severe sepsis -from healthcare associated pneumonia.  Came close yesterday but did not require pressors.  Has remained afebrile.  Blood pressure now above 100.  White count down from 28k  to 12.5k.  Lactates normal.  Blood cultures negative x24 hours.  Continue Vanco and cefepime.  Continue IV fluid resuscitation.    #2 Healthcare associated pneumonia -recently at [facility].  Still has pneumonia evident on imaging.  As above continue antibiotics and supportive care.  Follow-up respiratory culture.    #3 acute renal failure -in setting of sepsis and hypotension.  Unclear baseline.  Will check a renal ultrasound.  Improving.  Continue IV fluids.  Encourage p.o.  Good urine output.    #4 hypertension -by history.  Interestingly enough he is on 3 blood pressure meds and Midodrine at home.  Does not make sense.  Pressures are better today.  Continue to hold any medications from home for now and monitor.  Restart as appropriate.    #5 hyperlipidemia -statin    #6 right hip pain -patient had complained of right hip pain on arrival.  Imaging showed no acute right hip fracture.  Showed an old right femoral nail without evidence of hardware complication.  There is also a mention of a remote right inferior pubic rami fracture.  This was discussed with Dr. Lopez and confirmed that this was an old and healed fracture.  Nothing acute.  Plan on PT and OT.  Weightbearing as tolerated.      Discharge Planning: Dispo ongoing.  He is improving.  May be able to move out of the ICU later on today if stays stable.  Plan for PT and OT.    Electronically signed by Pravin Michelle DO, 08/13/20, 07:16.      Electronically signed by Pravin Michelle DO at 08/13/20 0803     Pravin Michelle DO at 08/12/20 0848              [facility] Medicine Services  INPATIENT PROGRESS NOTE    Patient Name: Lance Rea  Date of Admission: 8/11/2020  Today's Date: 08/12/20  Length of Stay: 0  Primary Care Physician: Reyes Soto MD    Subjective   Chief Complaint: f/u sepsis    HPI   Patient admitted by my colleague Dr. Rodriguez earlier today after recent hospital stay at [facility] for pneumonia.  On arrival was found of a white count of 28,000 with still imaging showing pneumonia.  He was hypotensive in the ER status post 2 L and admitted to the floor.  I saw the patient and he was hypotensive with a systolic of 79.  He is awake alert and oriented.  Is interactive.  He denies dizziness and says he just feels weak and lethargic.  Denies chest pain or shortness of breath.  No abdominal pain or nausea.  Overall does not look toxic.  Patient denies any chronic history of low blood pressure.      Review of Systems   All pertinent negatives and positives are as above. All other systems have been reviewed and are negative unless otherwise stated.     Objective    Temp:  [97.6 °F (36.4 °C)-98.9 °F (37.2 °C)] 98.8 °F (37.1 °C)  Heart Rate:  [] 87  Resp:  [12-19] 16  BP: ()/(49-75) 87/49  Physical Exam  GEN: Awake, alert, interactive, in NAD  HEENT: PERRLA, EOMI, Anicteric, Trachea midline  Lungs: diminished but no wheezing or rales  Heart: RRR, +S1/s2, no rub  ABD: soft, nt/nd, +BS, no guarding/rebound  Extremities:  no cyanosis, no edema  Skin: no rashes or petechiae  Neuro: AAOx3, no focal deficits  Psych: normal mood & affect        Results Review:  I have reviewed the labs, radiology results, and diagnostic studies.    Laboratory Data:   Results from last 7 days   Lab Units 08/12/20  0554 08/12/20  0114   WBC 10*3/mm3 28.10* 27.25*   HEMOGLOBIN g/dL 11.8* 13.5   HEMATOCRIT % 36.1* 40.5   PLATELETS 10*3/mm3 183 219        Results from last 7 days   Lab Units 08/12/20  0554 08/12/20  0114   SODIUM mmol/L 140 140   POTASSIUM mmol/L 4.1 4.2   CHLORIDE mmol/L 105 101   CO2 mmol/L 20.0* 20.0*   BUN mg/dL 31* 32*   CREATININE mg/dL 4.03* 4.40*   CALCIUM mg/dL 7.3* 8.5*   BILIRUBIN mg/dL 0.4 0.5   ALK PHOS U/L 96 120*   ALT (SGPT) U/L 36 48*   AST (SGOT) U/L 28 38   GLUCOSE mg/dL 113* 127*       Culture Data:   No results found for: BLOODCX, URINECX, WOUNDCX, MRSACX, RESPCX,  [facility]    Radiology Data:   Imaging Results (Last 24 Hours)     Procedure Component Value Units Date/Time    CT Abdomen Pelvis Without Contrast [[id number]] Collected:  08/12/20 0746     Updated:  08/12/20 0754    Narrative:       CT abdomen pelvis without contrast     Indication: Back pain, nausea, vomiting     Comparison: 4/22/2012     DOSE LENGTH PRODUCT: 183 mGy cm. Automated exposure control was also  utilized to decrease patient radiation dose.     Findings:     LEFT lower lobe consolidation.     Shrunken nodular liver. Prior cholecystectomy. Dilated common bile duct  measuring 12 mm, unchanged from 2012. Spleen is enlarged measuring 13.1  cm craniocaudal dimension. Pancreas and adrenal glands appear  unremarkable.     No urolithiasis or hydronephrosis. No focal urinary bladder wall  thickening. Moderate size hiatal hernia. No abnormal bowel distention or  adjacent inflammation. Normal appendix. No ascites or free pelvic fluid.  No pelvic mass or pelvic collection.      Nonaneurysmal atherosclerotic abdominal aorta. No enlarged abdominal or  pelvic lymph nodes. No acute soft tissue finding. Multilevel lumbar  spine degenerative change and levoscoliotic curvature. No acute osseous  finding.       Impression:       Impression:  1.  LEFT lower lobe consolidation, likely representing pneumonia.  2.  No evidence of bowel obstruction or active bowel inflammation.  3.  Moderate size hiatal hernia.  4.  Liver appears cirrhotic. Mild splenomegaly.     These findings are in agreement with the critical and emergent findings  from the [facility] preliminary report.  This report was finalized on 08/12/2020 07:51 by Dr. Tiburcio Lopez MD.    CT Lower Extremity Right Without Contrast [[id number]] Collected:  08/12/20 0727     Updated:  08/12/20 0734    Narrative:       CT of the RIGHT hip without contrast     Indication: RIGHT hip pain     Comparison: Same day radiograph     Automated exposure control was also utilized to  decrease patient  radiation dose. Dose length product (mGy cm): 93     Findings:     Postoperative change of RIGHT femoral IM nail with dynamic head/neck  screw for transfixion of remote fracture which appears healed. No  evidence of hardware fracture or significant loosening. No acute femoral  head/neck fracture. Remote healed RIGHT inferior pubic rami fracture is  noted. The pubic symphysis is maintained. No acute finding in the  partially imaged pelvis.       Impression:       Impression:  1.  No acute fracture.  2.  Prior RIGHT femoral IM nail without evidence of hardware  complication.     These findings are in agreement with the critical and emergent findings  from the [facility] preliminary report.  This report was finalized on 08/12/2020 07:31 by Dr. Tiburcio Lopez MD.    XR Hip With or Without Pelvis 2 - 3 View Right [[id number]] Collected:  08/12/20 0717     Updated:  08/12/20 0722    Narrative:       XR HIP W OR WO PELVIS 2-3 VIEW RIGHT-     Indication: hip pain; N17.9-Acute kidney failure, unspecified;  A41.9-Sepsis, unspecified organism     Comparison: 9/12/2017     Findings:     No acute fracture or dislocation.   Partially imaged RIGHT femoral IM nail with dynamic head/neck screw.   Pubic symphysis and SI joints are maintained.   Remote RIGHT inferior pubic rami fracture is noted.   A few pelvic phleboliths are present.       Impression:       Impression:     No acute osseous finding.  This report was finalized on 08/12/2020 07:19 by Dr. Tiburcio Lopez MD.    XR Chest 1 View [[id number]] Collected:  08/12/20 0659     Updated:  08/12/20 0703    Narrative:       Frontal upright radiograph of the chest 8/12/2020 1:02 AM CDT     COMPARISON: September 12, 2017     HISTORY hypotension.     FINDINGS:   Hyperinflation and chronic changes noted in the pulmonary parenchyma. No  infiltrates are identified.. The cardiomediastinal silhouette and  pulmonary vascularity are within normal limits.      The osseous  structures and surrounding soft tissues demonstrate no acute  abnormality.       Impression:       1. COPD no acute cardiopulmonary process.        This report was finalized on 08/12/2020 07:00 by Dr. Kevin Gould MD.          I have reviewed the patient's current medications.     Assessment/Plan     Active Hospital Problems    Diagnosis   • **Sepsis, unspecified organism (CMS/HCC)   • Acute renal failure (CMS/HCC)   • HCAP (healthcare-associated pneumonia)   • Benign essential HTN   • Hyperlipidemia       #1 sepsis -from healthcare associated pneumonia.  Patient admitted on Vanco and Zosyn.  Given renal failure concurrently will change Zosyn to cefepime for now.  Continue aggressive IV fluid resuscitation.  A third liter IV fluid bolus was ordered and patient is still hypotensive.  Will transfer to ICU.  Continue maintenance fluids @100/hr.  Overall he does not look toxic.  Lactates are normal.  There is some question of him being chronically hypotensive as he has Midodrin on his home med list but he denies chronic hypotension.  Do not feel he needs pressors at this time but may become necessary, will monitor closely.    #2 Healthcare associated pneumonia -seen at [facility].  Still has pneumonia evident on imaging.  Given his white count and hypotension will treat as healthcare associated pneumonia.  Continue Vanco and as above change Zosyn to cefepime.  Follow-up cultures    #3 acute renal failure -setting of sepsis and hypotension.  Unclear baseline.  Will check a renal ultrasound.  Continue IV fluids.  Already starting to improve.  He is making urine.  May need bicarb fluids at some point.  Monitor closely.    #4 hypertension -needs verified med list from [facility]cos has metoprolol, lisinopril, as needed hydralazine, and 3 times daily Midrin.  Unclear why he is on his combination of medications.  Unclear if he is normally hypertensive or hypotensive at baseline. Will add back midodrine given he has  already had 3 liters of IVF and remains with low bp.     #5 hyperlipidemia -statin    #6 right hip pain -patient had complained of right hip pain on arrival.  Imaging showed no acute right hip fracture.  Showed an old right femoral nail without evidence of hardware complication.  There is also a mention of a remote right inferior pubic rami fracture.  This was discussed with Dr. Lopez and confirmed that this was an old  And healed fracture.  Nothing acute.  Plan on PT and OT.  Weightbearing as tolerated.      Discharge Planning: Ongoing.  Monitor in the ICU today.  Follow labs.    Electronically signed by Pravin Michelle DO, 08/12/20, 08:48.      Electronically signed by Pravin Michelle DO at 08/12/20 1206         8/12  Received boluses of  ivfl's     And   Iv n/s 0.9 at 100  Hr   .  Iv abx's      B/p 76/51  88/52  68/34         8/13 and 8/14  ivfl 100hr  Cont on iv abx's       8/14 IV fluids and abx continue; oxygen sats in 90s on rm air; good, nonproductive cough; assist x1 to the bathroom; sinus/ sinus tach  on tele; safety maintaine

## 2020-08-14 NOTE — PLAN OF CARE
Pt a/o. Vss tel on. Instr to use inc spir pt wants to lay in bedand refused to get in chairinstr on need to quit smoking et inc activity

## 2020-08-14 NOTE — THERAPY TREATMENT NOTE
Acute Care - Occupational Therapy Treatment Note  [facility]     Patient Name: Lance Rea  : 1960  MRN: [id number]  Today's Date: 2020  Onset of Illness/Injury or Date of Surgery: 20  Date of Referral to OT: 20  Referring Physician: Dr. Michelle    Admit Date: 2020       ICD-10-CM ICD-9-CM   1. Acute renal failure, unspecified acute renal failure type (CMS/HCC) N17.9 584.9   2. Sepsis, due to unspecified organism, unspecified whether acute organ dysfunction present (CMS/HCC) A41.9 038.9     995.91   3. Decreased activities of daily living (ADL) Z78.9 V49.89   4. Impaired mobility Z74.09 799.89     Patient Active Problem List   Diagnosis   • Cerebral contusion (CMS/HCC)   • Acute renal failure (CMS/HCC)   • Sepsis, unspecified organism (CMS/HCC)   • HCAP (healthcare-associated pneumonia)   • Benign essential HTN   • Hyperlipidemia     Past Medical History:   Diagnosis Date   • Anxiety    • COPD (chronic obstructive pulmonary disease) (CMS/HCC)    • Depression    • Encephalopathy    • GERD (gastroesophageal reflux disease)    • Hepatitis C    • Hyperlipidemia    • Hypertension    • Muscle weakness    • Stroke (CMS/HCC)      Past Surgical History:   Procedure Laterality Date   • CERVICAL SPINE SURGERY     • CHOLECYSTECTOMY     • COLONOSCOPY     • ENDOSCOPY     • FEMUR SURGERY     • REPLACEMENT TOTAL KNEE BILATERAL         Therapy Treatment    Rehabilitation Treatment Summary     Row Name 20 1328 20 0839          Treatment Time/Intention    Discipline  occupational therapy assistant  -CJ  physical therapy assistant  -AE     Document Type  therapy note (daily note)  -CJ  therapy note (daily note)  -AE     Subjective Information  no complaints  -CJ  no complaints  -AE     Mode of Treatment  occupational therapy  -CJ  --     Patient Effort  good  -CJ  --     Existing Precautions/Restrictions  fall  -CJ  fall  -AE     Recorded by [CJ] Fredy Perdomo COTA/L 20 1411 [AE]  Neelima Daley, PTA 08/14/20 0953     Row Name 08/14/20 1328 08/14/20 0839          Bed Mobility Assessment/Treatment    Supine-Sit [place] (Bed Mobility)  --  supervision  -AE     Sit-Supine [place] (Bed Mobility)  --  supervision  -AE     Comment (Bed Mobility)  fowlers in bed!  -CJ  --     Recorded by [CJ] Fredy Perdomo COTA/FAISAL 08/14/20 1411 [AE] Neelima Daley, PTA 08/14/20 0953     Row Name 08/14/20 0839             Sit-Stand Transfer    Sit-Stand [place] (Transfers)  contact guard  -AE      Recorded by [AE] Neelima Daley, PTA 08/14/20 0953      Row Name 08/14/20 0839             Gait/Stairs Assessment/Training    [place] Level (Gait)  contact guard;stand by assist  -AE      Assistive Device (Gait)  walker, front-wheeled  -AE      Distance in Feet (Gait)  150  -AE      Pattern (Gait)  swing-through  -AE      Deviations/Abnormal Patterns (Gait)  eliot decreased  -AE      Recorded by [AE] Neelima Daley, PTA 08/14/20 0953      Row Name 08/14/20 1328             Motor Skills Assessment/Interventions    Additional Documentation  Therapeutic Exercise (Group)  -CJ      Recorded by [CJ] Fredy Perdomo COTA/L 08/14/20 1411      Row Name 08/14/20 1328 08/14/20 0839          Therapeutic Exercise    Upper Extremity (Therapeutic Exercise)  bicep curl, bilateral;wand exercises  -CJ  --     Upper Extremity Range of Motion (Therapeutic Exercise)  shoulder flexion/extension, bilateral;elbow flexion/extension, bilateral;wrist flexion/extension, bilateral  -CJ  --     Lower Extremity (Therapeutic Exercise)  --  LAQ (long arc quad), bilateral  -AE     Lower Extremity Range of Motion (Therapeutic Exercise)  --  ankle dorsiflexion/plantar flexion, bilateral  -AE     Weight/Resistance (Therapeutic Exercise)  2 pounds;3 pounds  -CJ  --     Exercise Type (Therapeutic Exercise)  AROM (active range of motion)  -CJ  --     Position (Therapeutic Exercise)  seated  -CJ  seated  -AE     Sets/Reps  (Therapeutic Exercise)  2 sets x 20 reps!  -CJ  15  -AE     Equipment (Therapeutic Exercise)  dowel caryn/wand;free weight, barbell  -CJ  --     Expected Outcome (Therapeutic Exercise)  facilitate normal movement patterns;improve functional stability;improve functional tolerance, self-care activity;improve motor control;improve neuromuscular control;improve performance, BADLs;improve performance, fine motor coordination skills;improve performance, gait skills;improve performance, transfer skills;improve postural control;increase active range of motion  -CJ  --     Recorded by [CJ] Fredy Perdomo COTA/FAISAL 08/14/20 1411 [AE] Neelima Daley, PTA 08/14/20 0953     Row Name 08/14/20 1328 08/14/20 0839          Positioning and Restraints    Pre-Treatment Position  in bed  -CJ  in bed  -AE     Post Treatment Position  bed  -CJ  bed  -AE     In Bed  fowlers;call light within reach;encouraged to call for assist;side rails up x2  -CJ  fowlers;call light within reach;exit alarm on  -AE     Recorded by [CJ] Fredy Perdomo COTA/L 08/14/20 1411 [AE] Neelima Daley, PTA 08/14/20 0953     Row Name 08/14/20 1328             Pain Assessment    Additional Documentation  Pain Scale 2: Word Pre/Post-Treatment (Group)  -CJ      Recorded by [CJ] Fredy Perdomo COTA/L 08/14/20 1411      Row Name 08/14/20 1328 08/14/20 0839          Pain Scale: Numbers Pre/Post-Treatment    Pain Scale: Numbers, Pretreatment  0/10 - no pain  -  0/10 - no pain  -AE     Pain Scale: Numbers, Post-Treatment  0/10 - no pain  -CJ  0/10 - no pain  -AE     Recorded by [CJ] Fredy Perdomo COTA/L 08/14/20 1411 [AE] Neelima Daley, PTA 08/14/20 0953     Row Name 08/14/20 1328             Pain Scale 2: Numbers Pre/Post-Treatment    Pain Intervention(s) 2  Repositioned;Rest  -CJ      Recorded by [CJ] Fredy Perdomo COTA/L 08/14/20 1411      Row Name 08/14/20 1328             Outcome Summary/Treatment Plan (OT)    Daily Summary of Progress (OT)   progress toward functional goals is good  -      Barriers to Overall Progress (OT)  Fall!  -      Plan for Continued Treatment (OT)  continue with  ot poc!  -CJ      Recorded by [CJ] Fredy Perdomo COTA/FAISAL 08/14/20 1411        User Key  (r) = Recorded By, (t) = Taken By, (c) = Cosigned By    Initials Name Effective Dates Discipline    AE Neelima Daley, PTA 06/22/15 -  PT    CJ Fredy Perdomo, MICHELLE/L 08/02/16 -  OT             Occupational Therapy Education                 Title: PT OT SLP Therapies (In Progress)     Topic: Occupational Therapy (Done)     Point: ADL training (Done)     Description:   Instruct learner(s) on proper safety adaptation and remediation techniques during self care or transfers.   Instruct in proper use of assistive devices.              Learning Progress Summary           Patient Acceptance, E, VU by SHAMIR at 8/13/2020 0906                   Point: Home exercise program (Done)     Description:   Instruct learner(s) on appropriate technique for monitoring, assisting and/or progressing therapeutic exercises/activities.              Learning Progress Summary           Patient Acceptance, E,TB, VU,DU,NR by JOSE DANIEL at 8/14/2020 1411    Comment:  Pt. performed ue exs to increase his act. rajat for adl tasks!                   Point: Precautions (Done)     Description:   Instruct learner(s) on prescribed precautions during self-care and functional transfers.              Learning Progress Summary           Patient Acceptance, E,TB, VU,DU,NR by JOSE DANIEL at 8/14/2020 1411    Comment:  Pt. performed ue exs to increase his act. rajat for adl tasks!    Acceptance, E, VU by SHAMIR at 8/13/2020 0906                   Point: Body mechanics (Done)     Description:   Instruct learner(s) on proper positioning and spine alignment during self-care, functional mobility activities and/or exercises.              Learning Progress Summary           Patient Acceptance, E,TB, VU,DU,NR by JOSE DANIEL at 8/14/2020 1411    Comment:   Pt. performed ue exs to increase his act. rajat for adl tasks!    Acceptance, E, VU by SHAMIR at 8/13/2020 0906                               User Key     Initials Effective Dates Name Provider Type [facility] 08/02/16 -  Fredy Perdomo COTA/L Occupational Therapy Assistant OT    [facility] 07/05/20 -  Phoebe Wade OTR/L Occupational Therapist OT                OT Recommendation and Plan  Outcome Summary/Treatment Plan (OT)  Daily Summary of Progress (OT): progress toward functional goals is good  Barriers to Overall Progress (OT): Fall!  Plan for Continued Treatment (OT): continue with  ot poc!  Daily Summary of Progress (OT): progress toward functional goals is good  Plan of Care Review  Plan of Care Reviewed With: patient  Plan of Care Reviewed With: patient  Outcome Measures     Row Name 08/14/20 1328 08/13/20 0900          How much help from another is currently needed...    Putting on and taking off regular lower body clothing?  3  -  3  -JJ     Bathing (including washing, rinsing, and drying)  3  -  3  -JJ     Toileting (which includes using toilet bed pan or urinal)  3  -  3  -JJ     Putting on and taking off regular upper body clothing  4  -  4  -JJ     Taking care of personal grooming (such as brushing teeth)  4  -  4  -JJ     Eating meals  4  -  4  -J     AM-PAC 6 Clicks Score (OT)  21  -  21  -        Functional Assessment    Outcome Measure Options  AM-PAC 6 Clicks Daily Activity (OT)  -  AM-PAC 6 Clicks Daily Activity (OT)  -       User Key  (r) = Recorded By, (t) = Taken By, (c) = Cosigned By    Initials Name Provider Type     Fredy Perdomo COTA/L Occupational Therapy Assistant    Phoebe Rodriguez OTR/L Occupational Therapist           Time Calculation:   Time Calculation- OT     Row Name 08/14/20 1328 08/14/20 0957          Time Calculation- OT    OT Start Time  1328  -  --     OT Stop Time  1355  -CJ  --     OT Time Calculation (min)  27 min  -  --      Total Timed Code Minutes- OT  27 minute(s)  -  --     TCU Minutes- OT  27 min  -  --     OT Received On  08/14/20  -  --     OT Goal Re-Cert Due Date  08/23/20  -  --        Timed Charges    73011 - Gait Training Minutes   --  20  -AE       User Key  (r) = Recorded By, (t) = Taken By, (c) = Cosigned By    Initials Name Provider Type    AE Neelima Daley, PTA Physical Therapy Assistant     Fredy Perdomo COTA/L Occupational Therapy Assistant        Therapy Charges for Today     Code Description Service Date Service Provider Modifiers Qty    32011046499 HC OT THER PROC EA 15 MIN 8/14/2020 Fredy Perdomo COTA/L GO 2               JOHN Sierra  8/14/2020

## 2020-08-14 NOTE — THERAPY TREATMENT NOTE
Acute Care] - Physical Therapy Treatment Note  [facility]     Patient Name: Lance Rea  : 1960  MRN: [id number]  Today's Date: 2020  Onset of Illness/Injury or Date of Surgery: 20     Referring Physician: Dr. Michelle    Admit Date: 2020    Visit Dx:    ICD-10-CM ICD-9-CM   1. Acute renal failure, unspecified acute renal failure type (CMS/HCC) N17.9 584.9   2. Sepsis, due to unspecified organism, unspecified whether acute organ dysfunction present (CMS/HCC) A41.9 038.9     995.91   3. Decreased activities of daily living (ADL) Z78.9 V49.89   4. Impaired mobility Z74.09 799.89     Patient Active Problem List   Diagnosis   • Cerebral contusion (CMS/HCC)   • Acute renal failure (CMS/HCC)   • Sepsis, unspecified organism (CMS/[facility])   • HCAP (healthcare-associated pneumonia)   • Benign essential HTN   • Hyperlipidemia       Therapy Treatment    Rehabilitation Treatment Summary     Row Name 20 0839             Treatment Time/Intention    Discipline  physical therapy assistant  -AE      Document Type  therapy note (daily note)  -AE      Subjective Information  no complaints  -AE      Existing Precautions/Restrictions  fall  -AE      Recorded by [AE] Neelima Daley, PTA 2053      Row Name 20             Bed Mobility Assessment/Treatment    Supine-Sit [place] (Bed Mobility)  supervision  -AE      Sit-Supine [place] (Bed Mobility)  supervision  -AE      Recorded by [AE] Neelima Daley PTA 2053      Row Name 2039             Sit-Stand Transfer    Sit-Stand [place] (Transfers)  contact guard  -AE      Recorded by [AE] Neelima Daley, PTA 2053      Row Name 20 0839             Gait/Stairs Assessment/Training    [place] Level (Gait)  contact guard;stand by assist  -AE      Assistive Device (Gait)  walker, front-wheeled  -AE      Distance in Feet (Gait)  150  -AE      Pattern (Gait)  swing-through  -AE       Deviations/Abnormal Patterns (Gait)  eliot decreased  -AE      Recorded by [AE] Neelima Daley, PTA 08/14/20 0953      Row Name 08/14/20 0839             Therapeutic Exercise    Lower Extremity (Therapeutic Exercise)  LAQ (long arc quad), bilateral  -AE      Lower Extremity Range of Motion (Therapeutic Exercise)  ankle dorsiflexion/plantar flexion, bilateral  -AE      Position (Therapeutic Exercise)  seated  -AE      Sets/Reps (Therapeutic Exercise)  15  -AE      Recorded by [AE] Neelima Daley, MARTHA 08/14/20 0953      Row Name 08/14/20 0839             Positioning and Restraints    Pre-Treatment Position  in bed  -AE      Post Treatment Position  bed  -AE      In Bed  fowlers;call light within reach;exit alarm on  -AE      Recorded by [AE] Neelima Daley, MARTHA 08/14/20 0953      Row Name 08/14/20 0839             Pain Scale: Numbers Pre/Post-Treatment    Pain Scale: Numbers, Pretreatment  0/10 - no pain  -AE      Pain Scale: Numbers, Post-Treatment  0/10 - no pain  -AE      Recorded by [AE] Neelima Daley, hospitals 08/14/20 0953        User Key  (r) = Recorded By, (t) = Taken By, (c) = Cosigned By    Initials Name Effective Dates Discipline    AE Neelima Daley, hospitals 06/22/15 -  PT                   Physical Therapy Education                 Title: PT OT SLP Therapies (In Progress)     Topic: Physical Therapy (In Progress)     Point: Mobility training (Done)     Description:   Instruct learner(s) on safety and technique for assisting patient out of bed, chair or wheelchair.  Instruct in the proper use of assistive devices, such as walker, crutches, cane or brace.              Patient Friendly Description:   It's important to get you on your feet again, but we need to do so in a way that is safe for you. Falling has serious consequences, and your personal safety is the most important thing of all.        When it's time to get out of bed, one of us or a family member will sit next to you on the bed to give you  support.     If your doctor or nurse tells you to use a walker, crutches, a cane, or a brace, be sure you use it every time you get out of bed, even if you think you don't need it.    Learning Progress Summary           Patient Acceptance, LINDA ESPAÑA DU by JANET at 8/13/2020 0830    Comment:  Educated pt on progression of PT POC and benefits of activity                   Point: Home exercise program (Not Started)     Description:   Instruct learner(s) on appropriate technique for monitoring, assisting and/or progressing patient with therapeutic exercises and activities.              Learner Progress:   Not documented in this visit.          Point: Body mechanics (Not Started)     Description:   Instruct learner(s) on proper positioning and spine alignment for patient and/or caregiver during mobility tasks and/or exercises.              Learner Progress:   Not documented in this visit.          Point: Precautions (Not Started)     Description:   Instruct learner(s) on prescribed precautions during mobility and gait tasks              Learner Progress:   Not documented in this visit.                      User Key     Initials Effective Dates Name Provider Type Discipline    JANET 08/02/16 -  Michael Houser, PT DPT Physical Therapist PT                PT Recommendation and Plan     Plan of Care Reviewed With: patient  Progress: improving  Outcome Summary: He was CGA to stand and ambulate with a RW. He ambulated ~ 60 ft and demos a slow pace and generalized weakness with activity. PT will cont to progress gait, balance, strength and endurance. I anticiapte he will d.c back to [facility] with HH.  Outcome Measures     Row Name 08/13/20 0900             How much help from another is currently needed...    Putting on and taking off regular lower body clothing?  3  -JJ      Bathing (including washing, rinsing, and drying)  3  -JJ      Toileting (which includes using toilet bed pan or urinal)  3  -JJ      Putting on and taking off regular  upper body clothing  4  -JJ      Taking care of personal grooming (such as brushing teeth)  4  -JJ      Eating meals  4  -JJ      AM-PAC 6 Clicks Score (OT)  21  -JJ         Functional Assessment    Outcome Measure Options  AM-PAC 6 Clicks Daily Activity (OT)  -JJ        User Key  (r) = Recorded By, (t) = Taken By, (c) = Cosigned By    Initials Name Provider Type     Phoebe Wade OTR/L Occupational Therapist         Time Calculation:   PT Charges     Row Name 08/14/20 0957             Time Calculation    Start Time  0839  -AE      Stop Time  0908  -AE      Time Calculation (min)  29 min  -AE      PT Received On  08/14/20  -AE      PT Goal Re-Cert Due Date  08/23/20  -AE         Time Calculation- PT    Total Timed Code Minutes- PT  29 minute(s)  -AE         Timed Charges    51505 - PT Therapeutic Exercise Minutes  9  -AE      02841 - Gait Training Minutes   20  -AE        User Key  (r) = Recorded By, (t) = Taken By, (c) = Cosigned By    Initials Name Provider Type    AE Neelima Daley PTA Physical Therapy Assistant        Therapy Charges for Today     Code Description Service Date Service Provider Modifiers Qty    56639965325 HC GAIT TRAINING EA 15 MIN 8/14/2020 Neelima Daley PTA GP 1    88725531352 HC PT THER PROC EA 15 MIN 8/14/2020 Neelima Daley PTA GP 1          PT G-Codes  Outcome Measure Options: AM-PAC 6 Clicks Daily Activity (OT)  AM-PAC 6 Clicks Score (PT): 18  AM-PAC 6 Clicks Score (OT): 21    Neelima Daley PTA  8/14/2020

## 2020-08-15 LAB
ANION GAP SERPL CALCULATED.3IONS-SCNC: 13 MMOL/L (ref 5–15)
BUN SERPL-MCNC: 16 MG/DL (ref 6–20)
BUN/CREAT SERPL: 19.8 (ref 7–25)
CALCIUM SPEC-SCNC: 8.9 MG/DL (ref 8.6–10.5)
CHLORIDE SERPL-SCNC: 105 MMOL/L (ref 98–107)
CO2 SERPL-SCNC: 22 MMOL/L (ref 22–29)
CREAT SERPL-MCNC: 0.81 MG/DL (ref 0.76–1.27)
GFR SERPL CREATININE-BSD FRML MDRD: 98 ML/MIN/1.73
GLUCOSE SERPL-MCNC: 92 MG/DL (ref 65–99)
POTASSIUM SERPL-SCNC: 4.1 MMOL/L (ref 3.5–5.2)
SODIUM SERPL-SCNC: 140 MMOL/L (ref 136–145)

## 2020-08-15 PROCEDURE — 94799 UNLISTED PULMONARY SVC/PX: CPT

## 2020-08-15 PROCEDURE — 97116 GAIT TRAINING THERAPY: CPT

## 2020-08-15 PROCEDURE — 25010000002 CEFEPIME PER 500 MG: Performed by: INTERNAL MEDICINE

## 2020-08-15 PROCEDURE — 80048 BASIC METABOLIC PNL TOTAL CA: CPT | Performed by: INTERNAL MEDICINE

## 2020-08-15 PROCEDURE — 25010000002 VANCOMYCIN 10 G RECONSTITUTED SOLUTION: Performed by: INTERNAL MEDICINE

## 2020-08-15 RX ORDER — GABAPENTIN 300 MG/1
300 CAPSULE ORAL 3 TIMES DAILY
Status: DISCONTINUED | OUTPATIENT
Start: 2020-08-15 | End: 2020-08-16 | Stop reason: HOSPADM

## 2020-08-15 RX ORDER — METOPROLOL TARTRATE 50 MG/1
50 TABLET, FILM COATED ORAL EVERY 12 HOURS SCHEDULED
Status: DISCONTINUED | OUTPATIENT
Start: 2020-08-15 | End: 2020-08-16 | Stop reason: HOSPADM

## 2020-08-15 RX ORDER — FOLIC ACID 1 MG/1
1 TABLET ORAL DAILY
Status: DISCONTINUED | OUTPATIENT
Start: 2020-08-15 | End: 2020-08-16 | Stop reason: HOSPADM

## 2020-08-15 RX ORDER — LEVOFLOXACIN 750 MG/1
750 TABLET ORAL EVERY 24 HOURS
Status: DISCONTINUED | OUTPATIENT
Start: 2020-08-15 | End: 2020-08-16 | Stop reason: HOSPADM

## 2020-08-15 RX ORDER — LISINOPRIL 10 MG/1
10 TABLET ORAL
Status: DISCONTINUED | OUTPATIENT
Start: 2020-08-15 | End: 2020-08-16 | Stop reason: HOSPADM

## 2020-08-15 RX ORDER — SUCRALFATE 1 G/1
1 TABLET ORAL
Status: DISCONTINUED | OUTPATIENT
Start: 2020-08-15 | End: 2020-08-15

## 2020-08-15 RX ORDER — FERROUS SULFATE 325(65) MG
325 TABLET ORAL
Status: DISCONTINUED | OUTPATIENT
Start: 2020-08-15 | End: 2020-08-16 | Stop reason: HOSPADM

## 2020-08-15 RX ADMIN — CLONAZEPAM 0.5 MG: 0.5 TABLET ORAL at 21:44

## 2020-08-15 RX ADMIN — IPRATROPIUM BROMIDE AND ALBUTEROL SULFATE 3 ML: 2.5; .5 SOLUTION RESPIRATORY (INHALATION) at 14:55

## 2020-08-15 RX ADMIN — GABAPENTIN 300 MG: 300 CAPSULE ORAL at 17:48

## 2020-08-15 RX ADMIN — CLONAZEPAM 0.5 MG: 0.5 TABLET ORAL at 08:42

## 2020-08-15 RX ADMIN — IPRATROPIUM BROMIDE AND ALBUTEROL SULFATE 3 ML: 2.5; .5 SOLUTION RESPIRATORY (INHALATION) at 10:51

## 2020-08-15 RX ADMIN — METOPROLOL TARTRATE 50 MG: 50 TABLET, FILM COATED ORAL at 21:44

## 2020-08-15 RX ADMIN — GUAIFENESIN 1200 MG: 600 TABLET, EXTENDED RELEASE ORAL at 08:42

## 2020-08-15 RX ADMIN — GUAIFENESIN 1200 MG: 600 TABLET, EXTENDED RELEASE ORAL at 21:44

## 2020-08-15 RX ADMIN — SODIUM CHLORIDE, PRESERVATIVE FREE 10 ML: 5 INJECTION INTRAVENOUS at 21:45

## 2020-08-15 RX ADMIN — LISINOPRIL 10 MG: 10 TABLET ORAL at 12:17

## 2020-08-15 RX ADMIN — SODIUM CHLORIDE, PRESERVATIVE FREE 10 ML: 5 INJECTION INTRAVENOUS at 08:42

## 2020-08-15 RX ADMIN — GABAPENTIN 300 MG: 300 CAPSULE ORAL at 21:44

## 2020-08-15 RX ADMIN — GABAPENTIN 300 MG: 300 CAPSULE ORAL at 12:16

## 2020-08-15 RX ADMIN — PANTOPRAZOLE SODIUM 40 MG: 40 TABLET, DELAYED RELEASE ORAL at 08:42

## 2020-08-15 RX ADMIN — VANCOMYCIN HYDROCHLORIDE 1250 MG: 10 INJECTION, POWDER, LYOPHILIZED, FOR SOLUTION INTRAVENOUS at 05:44

## 2020-08-15 RX ADMIN — FOLIC ACID 1 MG: 1 TABLET ORAL at 12:16

## 2020-08-15 RX ADMIN — ASPIRIN 81 MG 81 MG: 81 TABLET ORAL at 08:42

## 2020-08-15 RX ADMIN — FERROUS SULFATE TAB 325 MG (65 MG ELEMENTAL FE) 325 MG: 325 (65 FE) TAB at 12:17

## 2020-08-15 RX ADMIN — LEVOFLOXACIN 750 MG: 750 TABLET, FILM COATED ORAL at 12:17

## 2020-08-15 RX ADMIN — CEFEPIME HYDROCHLORIDE 2 G: 2 INJECTION, POWDER, FOR SOLUTION INTRAVENOUS at 01:05

## 2020-08-15 RX ADMIN — SERTRALINE 150 MG: 100 TABLET, FILM COATED ORAL at 21:44

## 2020-08-15 RX ADMIN — ATORVASTATIN CALCIUM 10 MG: 10 TABLET, FILM COATED ORAL at 21:45

## 2020-08-15 RX ADMIN — IPRATROPIUM BROMIDE AND ALBUTEROL SULFATE 3 ML: 2.5; .5 SOLUTION RESPIRATORY (INHALATION) at 20:01

## 2020-08-15 RX ADMIN — METOPROLOL TARTRATE 25 MG: 25 TABLET, FILM COATED ORAL at 08:42

## 2020-08-15 RX ADMIN — IPRATROPIUM BROMIDE AND ALBUTEROL SULFATE 3 ML: 2.5; .5 SOLUTION RESPIRATORY (INHALATION) at 06:31

## 2020-08-15 NOTE — PROGRESS NOTES
[facility] Medicine Services  INPATIENT PROGRESS NOTE    Patient Name: Lance Rea  Date of Admission: 8/11/2020  Today's Date: 08/15/20  Length of Stay: 3  Primary Care Physician: Reyes Soto MD    Subjective   Chief Complaint: f/u sepsis    HPI:  Patient seen and examined.  Feels well.  No new complaints.  Breathing is doing well.  No chest pain, no dizziness, no shortness of breath.  Had lost an IV earlier today, no other acute issues or events overnight.    ROS:  All pertinent negatives and positives are as above. All other systems have been reviewed and are negative unless otherwise stated.     Objective    Temp:  [97.4 °F (36.3 °C)-99.1 °F (37.3 °C)] 98.4 °F (36.9 °C)  Heart Rate:  [] 89  Resp:  [16-18] 16  BP: (126-147)/(66-84) 145/81  Physical Exam  GEN: Awake, alert, interactive, in NAD  HEENT: PERRLA, EOMI, Anicteric, Trachea midline  Lungs: Improved breath sounds bilaterally.  No wheezing, rales, rhonchi  Heart: RRR, +S1/s2, no rub  ABD: soft, nt/nd, +BS, no guarding/rebound  Extremities:  no cyanosis, no edema  Skin: no rashes or petechiae  Neuro: AAOx3, no focal deficits  Psych: normal mood & affect        Results Review:  I have reviewed the labs, radiology results, and diagnostic studies.    Laboratory Data:   Results from last 7 days   Lab Units 08/14/20 0420 08/13/20 0252 08/12/20  0554   WBC 10*3/mm3 8.26 12.49* 28.10*   HEMOGLOBIN g/dL 11.3* 11.1* 11.8*   HEMATOCRIT % 34.3* 34.2* 36.1*   PLATELETS 10*3/mm3 165 151 183        Results from last 7 days   Lab Units 08/15/20  0449 08/14/20  0420 08/13/20  0252  08/12/20  0554 08/12/20  0114   SODIUM mmol/L 140 143 142   < > 140 140   POTASSIUM mmol/L 4.1 4.2 4.0   < > 4.1 4.2   CHLORIDE mmol/L 105 111* 112*   < > 105 101   CO2 mmol/L 22.0 21.0* 20.0*   < > 20.0* 20.0*   BUN mg/dL 16 16 21*   < > 31* 32*   CREATININE mg/dL 0.81 0.88 1.78*   < > 4.03* 4.40*   CALCIUM mg/dL 8.9 8.5* 8.1*   < >  7.3* 8.5*   BILIRUBIN mg/dL  --   --   --   --  0.4 0.5   ALK PHOS U/L  --   --   --   --  96 120*   ALT (SGPT) U/L  --   --   --   --  36 48*   AST (SGOT) U/L  --   --   --   --  28 38   GLUCOSE mg/dL 92 96 106*   < > 113* 127*    < > = values in this interval not displayed.       Culture Data:   No results found for: BLOODCX, URINECX, WOUNDCX, MRSACX, RESPCX, STOOLCX    Radiology Data:   Imaging Results (Last 24 Hours)     ** No results found for the last 24 hours. **          I have reviewed the patient's current medications.     Assessment/Plan     Active Hospital Problems    Diagnosis   • **Sepsis, unspecified organism (CMS/[facility])   • Acute renal failure (CMS/[facility])   • HCAP (healthcare-associated pneumonia)   • Benign essential HTN   • Hyperlipidemia       #1 severe sepsis -from healthcare associated pneumonia.  Improving here on Vanco and cefepime.  Afebrile now and white count has resolved from almost 30 K on arrival.  Sputum now growing Klebsiella good sensitivities.  Lost his IV earlier.  Will change to p.o. Levaquin today and monitor.  Blood culture so far negative.    #2 Healthcare associated pneumonia -recently at [facility].  Still has pneumonia evident on imaging.  As above changed to Levaquin today.  Continue supportive care.    #3 acute renal failure -in setting of sepsis and hypotension.  Creatinine 4.4 on arrival is now normalized with treatment of sepsis and hypotension. Now off ivf    #4 hypertension -by history.  Interestingly enough he is on 3 blood pressure meds and Midodrine at home.  Was hypotensive on arrival but after sepsis treatment has improved.  Did well with metoprolol 25 twice daily yesterday.  Will increase to home dose of 50.  Start low-dose lisinopril.  Continue to monitor.    #5 hyperlipidemia -statin    #6 right hip pain -patient had complained of right hip pain on arrival.  Imaging showed no acute right hip fracture.  Showed an old right femoral nail without evidence of  hardware complication.  There is also a mention of a remote right inferior pubic rami fracture.  This was discussed with Dr. Lopez and confirmed that this was an old and healed fracture.  Nothing acute.  Plan on PT and OT.  Weightbearing as tolerated.      Discharge Planning: Continue therapies here.  Plan for discharge to either [facility] or with [facility] and home health services tomorrow if stable and doing well.    Electronically signed by Pravin Michelle DO, 08/15/20, 09:38.

## 2020-08-15 NOTE — NURSING NOTE
Peripheral IV attempted x 2 with no success.  Will ask another nurse to attempt.  Merry Dumont RN

## 2020-08-15 NOTE — PLAN OF CARE
Problem: Patient Care Overview  Goal: Plan of Care Review  Outcome: Ongoing (interventions implemented as appropriate)  Flowsheets (Taken 8/15/2020 1018)  Progress: improving  Plan of Care Reviewed With: patient  Outcome Summary: Pt performed bed mobility indepedently. Transfered sit to stand with CGA.  Amb 150' with RWX and CGA. Will continue to work with pt to increase strength and progress to amb without AD

## 2020-08-15 NOTE — PLAN OF CARE
Problem: Patient Care Overview  Goal: Plan of Care Review  Outcome: Ongoing (interventions implemented as appropriate)  Note:   VSS, no c/o of pain; Iv abx continue; stable on rm air; sinus 73-96 on tele; awaiting home health or return to [facility] [place] in 1-2 days; safety maintained, will continue to monitor     Problem: Fall Risk (Adult)  Goal: Absence of Fall  Outcome: Ongoing (interventions implemented as appropriate)     Problem: Pain, Chronic (Adult)  Goal: Acceptable Pain/Comfort Level and Functional Ability  Outcome: Ongoing (interventions implemented as appropriate)     Problem: Patient Care Overview  Goal: Individualization and Mutuality  Outcome: Ongoing (interventions implemented as appropriate)     Problem: Patient Care Overview  Goal: Discharge Needs Assessment  Outcome: Ongoing (interventions implemented as appropriate)     Problem: Patient Care Overview  Goal: Interprofessional Rounds/Family Conf  Outcome: Ongoing (interventions implemented as appropriate)     Problem: Renal Failure/Kidney Injury, Acute (Adult)  Goal: Signs and Symptoms of Listed Potential Problems Will be Absent, Minimized or Managed (Renal Failure/Kidney Injury, Acute)  Outcome: Ongoing (interventions implemented as appropriate)

## 2020-08-15 NOTE — THERAPY TREATMENT NOTE
Acute Care - Physical Therapy Treatment Note  [facility]     Patient Name: Lance Rea  : 1960  MRN: [id number]  Today's Date: 8/15/2020  Onset of Illness/Injury or Date of Surgery: 20     Referring Physician: Dr. Michelle    Admit Date: 2020    Visit Dx:    ICD-10-CM ICD-9-CM   1. Acute renal failure, unspecified acute renal failure type (CMS/HCC) N17.9 584.9   2. Sepsis, due to unspecified organism, unspecified whether acute organ dysfunction present (CMS/[facility]) A41.9 038.9     995.91   3. Decreased activities of daily living (ADL) Z78.9 V49.89   4. Impaired mobility Z74.09 799.89     Patient Active Problem List   Diagnosis   • Cerebral contusion (CMS/[facility])   • Acute renal failure (CMS/[facility])   • Sepsis, unspecified organism (CMS/[facility])   • HCAP (healthcare-associated pneumonia)   • Benign essential HTN   • Hyperlipidemia       Therapy Treatment    Rehabilitation Treatment Summary     Row Name 08/15/20 1018             Treatment Time/Intention    Discipline  physical therapy assistant  -BRITTANI      Document Type  therapy note (daily note)  -BRITTANI      Subjective Information  no complaints  -JB2      Existing Precautions/Restrictions  fall  -JB2      Recorded by [BRITTANI] Collin Silva, PTA 08/15/20 1018  [JB2] Collin Silva, PTA 08/15/20 1028      Row Name 08/15/20 1018             Bed Mobility Assessment/Treatment    Supine-Sit [place] (Bed Mobility)  independent  -BRITTANI      Sit-Supine [place] (Bed Mobility)  independent  -BRITTANI      Recorded by [BRITTANI] Collin Silva, PTA 08/15/20 1028      Row Name 08/15/20 1018             Sit-Stand Transfer    Sit-Stand [place] (Transfers)  verbal cues;contact guard  -BRITTANI      Assistive Device (Sit-Stand Transfers)  walker, front-wheeled  -BRITTANI      Recorded by [BRITTANI] Collin Silva, PTA 08/15/20 1028      Row Name 08/15/20 1018             Stand-Sit Transfer    Stand-Sit [place] (Transfers)  verbal cues;contact guard  -BRITTANI      Recorded by [BRITTANI] Ricardo  Collin MALONE, PTA 08/15/20 1028      Row Name 08/15/20 1018             Gait/Stairs Assessment/Training    29953 - Gait Training Minutes   10  -BRITTANI      [place] Level (Gait)  verbal cues;contact guard  -BRITTANI      Assistive Device (Gait)  walker, front-wheeled  -BRITTANI      Distance in Feet (Gait)  150  -BRITTANI      Recorded by [BRITTANI] Collin Silva, PTA 08/15/20 1028      Row Name 08/15/20 1018             Positioning and Restraints    Pre-Treatment Position  in bed  -BRITTANI      Post Treatment Position  bed  -BRITTANI      In Bed  fowlers;call light within reach;encouraged to call for assist;side rails up x2  -BRITTANI      Recorded by [BRITTANI] Collin Silva, PTA 08/15/20 1028      Row Name 08/15/20 1018             Pain Scale: Numbers Pre/Post-Treatment    Pain Scale: Numbers, Pretreatment  0/10 - no pain  -BRITTANI      Pain Scale: Numbers, Post-Treatment  0/10 - no pain  -BRITTANI      Recorded by [BRITTANI] Collin Silva, [facility] 08/15/20 1028        User Key  (r) = Recorded By, (t) = Taken By, (c) = Cosigned By    Initials Name Effective Dates Discipline    BRITTANI Collin Silva, PTA 12/08/16 -  PT                   Physical Therapy Education                 Title: PT OT SLP Therapies (In Progress)     Topic: Physical Therapy (In Progress)     Point: Mobility training (Done)     Description:   Instruct learner(s) on safety and technique for assisting patient out of bed, chair or wheelchair.  Instruct in the proper use of assistive devices, such as walker, crutches, cane or brace.              Patient Friendly Description:   It's important to get you on your feet again, but we need to do so in a way that is safe for you. Falling has serious consequences, and your personal safety is the most important thing of all.        When it's time to get out of bed, one of us or a family member will sit next to you on the bed to give you support.     If your doctor or nurse tells you to use a walker, crutches, a cane, or a brace, be sure you use it every time you  get out of bed, even if you think you don't need it.    Learning Progress Summary           Patient Acceptance, TACO, VU by BRITTANI at 8/15/2020 1018    Comment:  progression with amb    AcceptanceTACO VU, DU by JANET at 8/13/2020 0830    Comment:  Educated pt on progression of PT POC and benefits of activity                   Point: Home exercise program (Not Started)     Description:   Instruct learner(s) on appropriate technique for monitoring, assisting and/or progressing patient with therapeutic exercises and activities.              Learner Progress:   Not documented in this visit.          Point: Body mechanics (Not Started)     Description:   Instruct learner(s) on proper positioning and spine alignment for patient and/or caregiver during mobility tasks and/or exercises.              Learner Progress:   Not documented in this visit.          Point: Precautions (Not Started)     Description:   Instruct learner(s) on prescribed precautions during mobility and gait tasks              Learner Progress:   Not documented in this visit.                      User Key     Initials Effective Dates Name Provider Type Discipline    JANET 08/02/16 -  Michael Houser, PT DPT Physical Therapist PT    BRITTANI 12/08/16 -  Collin Silva, PTA Physical Therapy Assistant PT                PT Recommendation and Plan     Plan of Care Reviewed With: patient  Progress: improving  Outcome Summary: Pt performed bed mobility indepedently. Transfered sit to stand with CGA.  Amb 150' with RWX and CGA. Will continue to work with pt to increase strength and progress to amb without AD  Outcome Measures     Row Name 08/15/20 1018 08/14/20 1328 08/13/20 0900       How much help from another person do you currently need...    Turning from your back to your side while in flat bed without using bedrails?  3  -BRITTANI  --  --    Moving from lying on back to sitting on the side of a flat bed without bedrails?  3  -BRITTANI  --  --    Moving to and from a bed to a chair  (including a wheelchair)?  3  -BRITTANI  --  --    Standing up from a chair using your arms (e.g., wheelchair, bedside chair)?  3  -BRITTANI  --  --    Climbing 3-5 steps with a railing?  3  -BRITTANI  --  --    To walk in hospital room?  3  -BRITTANI  --  --    AM-PAC 6 Clicks Score (PT)  18  -BRITTANI  --  --       How much help from another is currently needed...    Putting on and taking off regular lower body clothing?  --  3  -  3  -JJ    Bathing (including washing, rinsing, and drying)  --  3  -  3  -JJ    Toileting (which includes using toilet bed pan or urinal)  --  3  -  3  -JJ    Putting on and taking off regular upper body clothing  --  4  -  4  -JJ    Taking care of personal grooming (such as brushing teeth)  --  4  -  4  -J    Eating meals  --  4  -  4  -JJ    AM-PAC 6 Clicks Score (OT)  --  21  -  21  -       Functional Assessment    Outcome Measure Options  AM-PAC 6 Clicks Basic Mobility (PT)  -BRITTANI  AM-PAC 6 Clicks Daily Activity (OT)  -  AM-PAC 6 Clicks Daily Activity (OT)  -      User Key  (r) = Recorded By, (t) = Taken By, (c) = Cosigned By    Initials Name Provider Type    Fredy Higuera, MICHELLE/L Occupational Therapy Assistant    Collin Perkins PTA Physical Therapy Assistant    Phoebe Rodriguez OTR/L Occupational Therapist         Time Calculation:   PT Charges     Row Name 08/15/20 1018             Time Calculation    Start Time  1018  -BRITTANI      Stop Time  1028  -      Time Calculation (min)  10 min  -      PT Received On  08/15/20  -         Time Calculation- PT    Total Timed Code Minutes- PT  10 minute(s)  -         Timed Charges    60044 - Gait Training Minutes   10  -BRITTANI        User Key  (r) = Recorded By, (t) = Taken By, (c) = Cosigned By    Initials Name Provider Type    Collin Perkins PTA Physical Therapy Assistant        Therapy Charges for Today     Code Description Service Date Service Provider Modifiers Qty    [id number] HC GAIT TRAINING EA 15 MIN 8/15/2020  Collin Silva, PTA GP 1          PT G-Codes  Outcome Measure Options: AM-PAC 6 Clicks Basic Mobility (PT)  AM-PAC 6 Clicks Score (PT): 18  AM-PAC 6 Clicks Score (OT): 21    Collin Silva, PTA  8/15/2020

## 2020-08-16 VITALS
DIASTOLIC BLOOD PRESSURE: 80 MMHG | WEIGHT: 139.6 LBS | SYSTOLIC BLOOD PRESSURE: 132 MMHG | HEART RATE: 76 BPM | RESPIRATION RATE: 16 BRPM | BODY MASS INDEX: 21.91 KG/M2 | TEMPERATURE: 98.5 F | HEIGHT: 67 IN | OXYGEN SATURATION: 98 %

## 2020-08-16 PROCEDURE — 94799 UNLISTED PULMONARY SVC/PX: CPT

## 2020-08-16 RX ORDER — LEVOFLOXACIN 750 MG/1
750 TABLET ORAL EVERY 24 HOURS
Qty: 5 TABLET | Refills: 0 | Status: SHIPPED | OUTPATIENT
Start: 2020-08-17 | End: 2020-08-22

## 2020-08-16 RX ORDER — FUROSEMIDE 40 MG/1
40 TABLET ORAL DAILY PRN
Status: ON HOLD
Start: 2020-08-16 | End: 2021-01-01

## 2020-08-16 RX ORDER — LISINOPRIL 20 MG/1
10 TABLET ORAL DAILY
Status: ON HOLD
Start: 2020-08-16 | End: 2021-01-01

## 2020-08-16 RX ADMIN — ASPIRIN 81 MG 81 MG: 81 TABLET ORAL at 08:50

## 2020-08-16 RX ADMIN — GUAIFENESIN 1200 MG: 600 TABLET, EXTENDED RELEASE ORAL at 08:50

## 2020-08-16 RX ADMIN — PANTOPRAZOLE SODIUM 40 MG: 40 TABLET, DELAYED RELEASE ORAL at 08:50

## 2020-08-16 RX ADMIN — FERROUS SULFATE TAB 325 MG (65 MG ELEMENTAL FE) 325 MG: 325 (65 FE) TAB at 08:50

## 2020-08-16 RX ADMIN — LEVOFLOXACIN 750 MG: 750 TABLET, FILM COATED ORAL at 08:50

## 2020-08-16 RX ADMIN — GABAPENTIN 300 MG: 300 CAPSULE ORAL at 08:50

## 2020-08-16 RX ADMIN — METOPROLOL TARTRATE 50 MG: 50 TABLET, FILM COATED ORAL at 08:50

## 2020-08-16 RX ADMIN — CLONAZEPAM 0.5 MG: 0.5 TABLET ORAL at 08:50

## 2020-08-16 RX ADMIN — LISINOPRIL 10 MG: 10 TABLET ORAL at 08:50

## 2020-08-16 RX ADMIN — FOLIC ACID 1 MG: 1 TABLET ORAL at 08:50

## 2020-08-16 RX ADMIN — IPRATROPIUM BROMIDE AND ALBUTEROL SULFATE 3 ML: 2.5; .5 SOLUTION RESPIRATORY (INHALATION) at 06:11

## 2020-08-16 RX ADMIN — IPRATROPIUM BROMIDE AND ALBUTEROL SULFATE 3 ML: 2.5; .5 SOLUTION RESPIRATORY (INHALATION) at 10:33

## 2020-08-16 NOTE — DISCHARGE PLACEMENT REQUEST
"Katy Rea (60 y.o. Male)     Date of Birth Social Security Number Address Home Phone MRN    1960  [place] [address] [phone] [id number]    Protestant Marital Status          [facility] Single       Admission Date Admission Type Admitting Provider Attending Provider Department, Room/Bed    8/11/20 Emergency Pravin Michelle, Pravin Acevedo,  [address], 461/1    Discharge Date Discharge Disposition Discharge Destination         Home-Health Care [facility] [place]              Attending Provider:  Pravin Michelle DO    Allergies:  Codeine    Isolation:  None   Infection:  None   Code Status:  CPR    Ht:  170.2 cm (67\")   Wt:  63.3 kg (139 lb 9.6 oz)    Admission Cmt:  None   Principal Problem:  Sepsis, unspecified organism (CMS/[facility]) [A41.9]                 Active Insurance as of 8/11/2020     Primary Coverage     Payor Plan Insurance Group Employer/Plan Group    ANTHEM MEDICAID ANTHEM MEDICAID [id number]     Payor Plan Address Payor Plan Phone Number Payor Plan Fax Number Effective Dates    [address] [address] [phone]  4/2/2020 - None Entered    [facility] [id number]       Subscriber Name Subscriber Birth Date Member ID       KATY REA 1960 [id number]                 Emergency Contacts      (Rel.) Home Phone Work Phone Mobile Phone    Tea Lux (Daughter) [phone] -- [phone]        Ambulatory Referral to Home Health [[id number]] (Order [id number])   Order   Date: 8/16/2020 Department: [address] Ordering/Authorizing: Pravin Michelle DO   Order History   Outpatient   Date/Time Action Taken User Additional Information   08/16/20 0948 Sign Pravin Michelle DO    Order Details     Frequency Duration Priority Order Class   None None Routine Internal Referral   Start Date/Time     Start Date   08/16/20   Order Information     Order Date Service Start Date Start Time   08/16/20 Medicine 08/16/20    Reference Links     Associated " Reports External References   View Encounter Current Health Referral Information   Order Questions     Question Answer Comment   Face to Face Visit Date: 8/16/2020    Follow-up provider for Plan of Care? I treated the patient in an acute care facility and will not continue treatment after discharge.    Follow-up provider: BETO SOTO    Reason/Clinical Findings weakness, poor gait, multiple hospitalizations    Describe mobility limitations that make leaving home difficult: decreased gait    Nursing/Therapeutic Services Requested Physical Therapy     Skilled Nursing    Skilled nursing orders: Medication education    Frequency: 1 Week 1           Source Order Set / Preference List     Preference List   AMB IM REFERRALS [[id number]]   Clinical Indications      ICD-10-CM ICD-9-CM   Decreased activities of daily living (ADL)     Z78.9 V49.89   Impaired mobility     Z74.09 799.89   Reprint Order Requisition     Ambulatory Referral to Home Health (Order #[id number]) on 8/16/20   Encounter-Level Cardiology Documents:     There are no encounter-level cardiology documents.   Encounter     View Encounter          Order Provider Info         Office phone Pager E-mail   Ordering User Pravin Michelle [phone] --   Authorizing Provider Pravin Michelle [phone] -- --   Attending Provider Pravin Michelle [phone] -- --   Linked Charges     No charges linked to this procedure   Tracking Reports      Cosign Tracking Order Transmittal Tracking   Authorized by:  Pravin Michelle DO  (NPI: [id number])       Lab Component SmartPhrase Guide     Ambulatory Referral to Home Health (Order #[id number]) on 8/16/20            History & Physical      Hill Álvarez DO at 08/12/20 0321              [facility] Medicine Services  HISTORY AND PHYSICAL    Date of Admission: 8/11/2020  Primary Care Physician: Beto Soto MD    Subjective     Chief Complaint:  Lethargy    History of Present Illness  60-year-old male who was recently discharged from [facility] on Wednesday for pneumonia.  He has been on outpatient Omnicef.  He was sent to his daughter's home on Friday from [facility].  He was very lethargic today.  He has had decreased urinary output.  He has had a nonproductive cough.  He has not felt well all day.  His daughter took his blood pressure at home and it was in the 70 systolic, and she brought him in due to concern.  The patient states that he just has not been feeling well.  No bowel movement today, he did eat a sandwich earlier, but that was all.  Right hip pain with recent repair, patient states that he is unable to lie on his hip.  He has chest pressure.        Review of Systems   Right hip pain  Nonproductive cough  Chest pressure  Lethargy due to hypotension    Otherwise complete ROS reviewed and negative except as mentioned in the HPI.    Past Medical History:   Past Medical History:   Diagnosis Date   • Anxiety    • COPD (chronic obstructive pulmonary disease) (CMS/HCC)    • Depression    • Encephalopathy    • GERD (gastroesophageal reflux disease)    • Hepatitis C    • Hyperlipidemia    • Hypertension    • Muscle weakness    • Stroke (CMS/HCC)      Past Surgical History:  Past Surgical History:   Procedure Laterality Date   • CERVICAL SPINE SURGERY     • CHOLECYSTECTOMY     • COLONOSCOPY     • ENDOSCOPY     • FEMUR SURGERY     • REPLACEMENT TOTAL KNEE BILATERAL       Social History:  reports that he has been smoking. He has never used smokeless tobacco. He reports that he drinks alcohol. He reports that he does not use drugs.    Family History: CAD    Allergies:  Allergies   Allergen Reactions   • Codeine Rash     Medications:  Prior to Admission medications    Medication Sig Start Date End Date Taking? Authorizing Provider   acetaminophen (TYLENOL) 325 MG tablet Take 650 mg by mouth Every 6 (Six) Hours As Needed for Mild Pain  or  Fever.    Antony De Oliveira MD   aspirin 81 MG chewable tablet Chew 81 mg Daily.    Antony De Oliveira MD   butalbital-acetaminophen-caffeine (FIORICET, ESGIC) -40 MG per tablet Take 1 tablet by mouth Every 4 (Four) Hours As Needed for Headache. 6/4/19   Nick Carver MD   clonazePAM (KlonoPIN) 0.5 MG tablet Take 0.5 mg by mouth 2 (Two) Times a Day.    Antony De Oliveira MD   diphenhydrAMINE HCl (BANOPHEN PO) Take 50 mg by mouth 4 (Four) Times a Day As Needed.    Antony De Oliveira MD   docusate sodium (COLACE) 100 MG capsule Take 100 mg by mouth 2 (Two) Times a Day.    Antony De Oliveira MD   folic acid (FOLVITE) 1 MG tablet Take 1 mg by mouth Daily.    Antony De Oliveira MD   gabapentin (NEURONTIN) 300 MG capsule Take 300 mg by mouth Every Night.    Antony De Oliveira MD   hydrALAZINE (APRESOLINE) 10 MG tablet Take 10 mg by mouth 3 (Three) Times a Day As Needed (SBP greater than 160).    Antony De Oliveira MD   Lactobacillus Acidophilus powder Take  by mouth Every Night.    Antony De Oliveira MD   lisinopril (PRINIVIL,ZESTRIL) 20 MG tablet Take 20 mg by mouth Daily.    Antony De Oliveira MD   magnesium hydroxide (MILK OF MAGNESIA) 400 MG/5ML suspension Take 30 mL by mouth Daily As Needed for Constipation.    Antony De Oliveira MD   meloxicam (MOBIC) 15 MG tablet Take 15 mg by mouth Daily.    Antony De Oliveira MD   metoprolol succinate XL (TOPROL-XL) 50 MG 24 hr tablet Take 50 mg by mouth Daily.    Antony De Oliveira MD   midodrine (PROAMATINE) 5 MG tablet Take 5 mg by mouth 3 (Three) Times a Day.    Antony De Oliveira MD   ondansetron (ZOFRAN) 4 MG tablet Take 4 mg by mouth Every 8 (Eight) Hours As Needed for Nausea or Vomiting.    Antony De Oliveira MD   pantoprazole (PROTONIX) 40 MG EC tablet Take 40 mg by mouth Daily.    Antony De Oliveira MD   sertraline (ZOLOFT) 100 MG tablet Take 150 mg by mouth Every Night.    Antony De Oliveira MD    "  simvastatin (ZOCOR) 10 MG tablet Take 10 mg by mouth Every Night.    ProviderAntony MD   thiamine (VITAMIN B-1) 100 MG tablet Take 100 mg by mouth Daily.    Antony De Oliveira MD   traMADol (ULTRAM) 50 MG tablet Take 100 mg by mouth Every 8 (Eight) Hours As Needed for Moderate Pain .    Antony De Oliveira MD   zolpidem (AMBIEN) 5 MG tablet Take 5 mg by mouth Every Night.    Antony De Oliveira MD     Objective     Vital Signs: BP (!) 78/56   Pulse 111   Temp 97.6 °F (36.4 °C) (Oral)   Resp 12   Ht 170.2 cm (67\")   Wt 63.5 kg (140 lb)   SpO2 94%   BMI 21.93 kg/m²    Physical Exam   Constitutional:   Thin and frail, eyes sunken.   HENT:   Head: Normocephalic and atraumatic.   Nose: Nose normal.   Mouth/Throat: Oropharynx is clear and moist.   Eyes: Conjunctivae and EOM are normal.   Neck: Normal range of motion. Neck supple.   Cardiovascular: Normal rate, regular rhythm and normal heart sounds.   Pulmonary/Chest: Effort normal. He has rales.   Abdominal: Soft. Bowel sounds are normal.   Musculoskeletal: He exhibits tenderness. He exhibits no edema.   Right lateral hip pain with palpable hard area at incision level.   Neurological: He is alert. No cranial nerve deficit.   Skin: Skin is warm and dry.   Psychiatric: He has a normal mood and affect.   Vitals reviewed.           Results Reviewed:  Lab Results (last 24 hours)     Procedure Component Value Units Date/Time    COVID PRE-OP / PRE-PROCEDURE SCREENING ORDER (NO ISOLATION) - Swab, Nasopharynx [[id number]] Collected:  08/12/20 0246    Specimen:  Swab from Nasopharynx Updated:  08/12/20 0311    Narrative:       The following orders were created for panel order COVID PRE-OP / PRE-PROCEDURE SCREENING ORDER (NO ISOLATION) - Swab, Nasopharynx.  Procedure                               Abnormality         Status                     ---------                               -----------         ------                     COVID-PAULY Soliman " IN-HOUS...[[id number]]  Normal              Final result                 Please view results for these tests on the individual orders.    COVID-19, ABBOTT IN-HOUSE,NP Swab (NO TRANSPORT MEDIA) 2 HR TAT - Swab, Nasopharynx [[id number]]  (Normal) Collected:  08/12/20 0246    Specimen:  Swab from Nasopharynx Updated:  08/12/20 0311     COVID19 Not Detected    Narrative:       Fact sheet for providers: https://www.fda.gov/media/893635/download     Fact sheet for patients: https://www.fda.gov/media/293506/download    Blood Culture - Blood, Arm, Right [[id number]] Collected:  08/12/20 0235    Specimen:  Blood from Arm, Right Updated:  08/12/20 0250    Blood Culture - Blood, Arm, Left [[id number]] Collected:  08/12/20 0241    Specimen:  Blood from Arm, Left Updated:  08/12/20 0249    Comprehensive Metabolic Panel [[id number]]  (Abnormal) Collected:  08/12/20 0114    Specimen:  Blood Updated:  08/12/20 0139     Glucose 127 mg/dL      BUN 32 mg/dL      Creatinine 4.40 mg/dL      Sodium 140 mmol/L      Potassium 4.2 mmol/L      Chloride 101 mmol/L      CO2 20.0 mmol/L      Calcium 8.5 mg/dL      Total Protein 7.3 g/dL      Albumin 4.10 g/dL      ALT (SGPT) 48 U/L      AST (SGOT) 38 U/L      Alkaline Phosphatase 120 U/L      Total Bilirubin 0.5 mg/dL      eGFR Non African Amer 14 mL/min/1.73      Comment: <15 Indicative of kidney failure.        eGFR   Amer --     Comment: <15 Indicative of kidney failure.        Globulin 3.2 gm/dL      A/G Ratio 1.3 g/dL      BUN/Creatinine Ratio 7.3     Anion Gap 19.0 mmol/L     Narrative:       GFR Normal >60  Chronic Kidney Disease <60  Kidney Failure <15      CK [[id number]]  (Normal) Collected:  08/12/20 0114    Specimen:  Blood Updated:  08/12/20 0138     Creatine Kinase 35 U/L     Ammonia [[id number]]  (Normal) Collected:  08/12/20 0114    Specimen:  Blood Updated:  08/12/20 0138     Ammonia 33 umol/L     Troponin [[id number]]  (Normal) Collected:  08/12/20 0114    Specimen:  Blood  Updated:  08/12/20 0137     Troponin T 0.010 ng/mL     Narrative:       Troponin T Reference Range:  <= 0.03 ng/mL-   Negative for AMI  >0.03 ng/mL-     Abnormal for myocardial necrosis.  Clinicians would have to utilize clinical acumen, EKG, Troponin and serial changes to determine if it is an Acute Myocardial Infarction or myocardial injury due to an underlying chronic condition.       Results may be falsely decreased if patient taking Biotin.      Lipase [[id number]]  (Normal) Collected:  08/12/20 0114    Specimen:  Blood Updated:  08/12/20 0134     Lipase 44 U/L     Ethanol [[id number]] Collected:  08/12/20 0114    Specimen:  Blood Updated:  08/12/20 0134     Ethanol % <0.010 %     Narrative:       Not for legal purposes. Chain of Custody not followed.     Lactic Acid, Plasma [[id number]]  (Normal) Collected:  08/12/20 0114    Specimen:  Blood Updated:  08/12/20 0134     Lactate 1.2 mmol/L     CBC & Differential [[id number]] Collected:  08/12/20 0114    Specimen:  Blood Updated:  08/12/20 0122    Narrative:       The following orders were created for panel order CBC & Differential.  Procedure                               Abnormality         Status                     ---------                               -----------         ------                     CBC Auto Differential[[id number]]        Abnormal            Final result                 Please view results for these tests on the individual orders.    CBC Auto Differential [[id number]]  (Abnormal) Collected:  08/12/20 0114    Specimen:  Blood Updated:  08/12/20 0122     WBC 27.25 10*3/mm3      RBC 4.77 10*6/mm3      Hemoglobin 13.5 g/dL      Hematocrit 40.5 %      MCV 84.9 fL      MCH 28.3 pg      MCHC 33.3 g/dL      RDW 14.8 %      RDW-SD 45.6 fl      MPV 9.8 fL      Platelets 219 10*3/mm3      Neutrophil % 92.3 %      Lymphocyte % 3.5 %      Monocyte % 3.3 %      Eosinophil % 0.0 %      Basophil % 0.3 %      Immature Grans % 0.6 %      Neutrophils, Absolute  25.14 10*3/mm3      Lymphocytes, Absolute 0.95 10*3/mm3      Monocytes, Absolute 0.91 10*3/mm3      Eosinophils, Absolute 0.01 10*3/mm3      Basophils, Absolute 0.08 10*3/mm3      Immature Grans, Absolute 0.16 10*3/mm3      nRBC 0.0 /100 WBC         Imaging Results (Last 24 Hours)     Procedure Component Value Units Date/Time    CT Abdomen Pelvis Without Contrast [[id number]] Resulted:  08/12/20 0208     Updated:  08/12/20 0213    XR Chest 1 View [[id number]] Resulted:  08/12/20 0123     Updated:  08/12/20 0124        I have personally reviewed and interpreted the radiology studies and ECG obtained at time of admission.     Assessment / Plan     Assessment:   Active Hospital Problems    Diagnosis   • Acute renal failure (CMS/[facility])     Impression:  1.  Acute renal failure   2.  Metabolic acidosis with anion gap of 19  3.  Bilateral pneumonia  4.  Leukocytosis with shift  5.  Hypotension/sepsis  6.  History of COPD    Plan:   1.  IV fluids  2.  Follow blood pressure closely  3.  Vancomycin and Zosyn  4.  Hold blood pressure medications  5.  Hold NSAIDs  6.  Labs in the morning  7.  Right hip x-ray secondary to pain  8.  Mucinex and duo nebs with Acapella  9.  Respiratory culture  10.  PT OT consult    Code Status: Full     I discussed the patient's findings and my recommendations with the patient and sister at bedside    Estimated length of stay 3 to 4 days    Patient seen and examined by me on 8/12/2020    Electronically signed by Hill Álvarez DO, 08/12/20, 03:21.                Electronically signed by Hill Álvarez DO at 08/12/20 0327       Prior to Admission Medications     Prescriptions Last Dose Informant Patient Reported? Taking?    acetaminophen (TYLENOL) 325 MG tablet Past Month Nursing Home Yes Yes    Take 650 mg by mouth Every 6 (Six) Hours As Needed for Mild Pain  or Fever.    Acidophilus Lactobacillus capsule Past Week Nursing Home Yes Yes    Take 1 capsule by mouth Daily.    albuterol sulfate   (90 Base) MCG/ACT inhaler Past Month Nursing Home Yes Yes    Inhale 2 puffs Every 4 (Four) Hours As Needed for Wheezing or Shortness of Air.    aspirin 81 MG EC tablet Past Week Nursing Home Yes Yes    Take 81 mg by mouth Daily.    benzonatate (TESSALON) 100 MG capsule Past Month Nursing Home Yes Yes    Take 200 mg by mouth 3 (Three) Times a Day As Needed for Cough.    cholestyramine light (Prevalite) 4 g packet Past Week Nursing Home Yes Yes    Take 4 g by mouth 2 (Two) Times a Day.    clonazePAM (KlonoPIN) 0.5 MG tablet Past Week Nursing Home Yes Yes    Take 0.5 mg by mouth 2 (Two) Times a Day.    diclofenac (VOLTAREN) 1 % gel gel Past Month Nursing Home Yes Yes    Apply 5 g topically to the appropriate area as directed 3 (Three) Times a Day As Needed (pain).    docusate sodium (COLACE) 100 MG capsule Past Week Nursing Home Yes Yes    Take 100 mg by mouth 2 (Two) Times a Day.    ferrous sulfate 325 (65 FE) MG tablet Past Week Nursing Home Yes Yes    Take 325 mg by mouth Daily With Breakfast.    folic acid (FOLVITE) 1 MG tablet Past Week Nursing Home Yes Yes    Take 1 mg by mouth Daily.    gabapentin (NEURONTIN) 300 MG capsule Past Week Nursing Home Yes Yes    Take 300 mg by mouth 3 (Three) Times a Day.    hydrALAZINE (APRESOLINE) 10 MG tablet Past Month Nursing Home Yes Yes    Take 10 mg by mouth Every 8 (Eight) Hours As Needed (SBP greater than 160).    ipratropium (ATROVENT) 0.02 % nebulizer solution Past Week Nursing Home Yes Yes    Take 500 mcg by nebulization Every 6 (Six) Hours.    metoprolol succinate XL (TOPROL-XL) 50 MG 24 hr tablet Past Week Nursing Home Yes Yes    Take 50 mg by mouth Daily.    midodrine (PROAMATINE) 5 MG tablet Past Week Nursing Home Yes Yes    Take 5 mg by mouth 3 (Three) Times a Day.    nicotine (NICODERM CQ) 7 MG/24HR patch Past Week Nursing Home Yes Yes    Place 1 patch on the skin as directed by provider Daily.    ondansetron (ZOFRAN) 4 MG tablet Past Month Nursing Home Yes  Yes    Take 4 mg by mouth Every 6 (Six) Hours As Needed for Nausea or Vomiting.    pantoprazole (PROTONIX) 40 MG EC tablet Past Week Nursing Home Yes Yes    Take 40 mg by mouth 2 (two) times a day.    sertraline (ZOLOFT) 100 MG tablet Past Week Nursing Home Yes Yes    Take 150 mg by mouth Every Night.    simvastatin (ZOCOR) 10 MG tablet Past Week Nursing Home Yes Yes    Take 10 mg by mouth Every Night.    sucralfate (CARAFATE) 1 g tablet Past Week Nursing Home Yes Yes    Take 1 g by mouth 4 (Four) Times a Day Before Meals & at Bedtime.    thiamine (VITAMIN B-1) 100 MG tablet Past Week Nursing Home Yes Yes    Take 100 mg by mouth Daily.    traMADol (ULTRAM) 50 MG tablet Past Month Nursing Home Yes Yes    Take 50 mg by mouth Every 8 (Eight) Hours As Needed for Moderate Pain .    zolpidem (AMBIEN) 5 MG tablet Past Week Nursing Home Yes Yes    Take 5 mg by mouth Every Night.    furosemide (LASIX) 40 MG tablet Past Week Nursing Home Yes Yes    Take 40 mg by mouth Daily.    lisinopril (PRINIVIL,ZESTRIL) 20 MG tablet Past Week Nursing Home Yes Yes    Take 20 mg by mouth Daily.    magnesium hydroxide (MILK OF MAGNESIA) 400 MG/5ML suspension More than a month Nursing Home Yes No    Take 30 mL by mouth Daily As Needed for Constipation.          Current Facility-Administered Medications   Medication Dose Route Frequency Provider Last Rate Last Dose   • acetaminophen (TYLENOL) tablet 650 mg  650 mg Oral Q6H PRN Pravin Michelle DO   650 mg at 08/13/20 2005   • aspirin chewable tablet 81 mg  81 mg Oral Daily Pravin Michelle DO   81 mg at 08/16/20 0850   • atorvastatin (LIPITOR) tablet 10 mg  10 mg Oral Nightly Pravin Michelle DO   10 mg at 08/15/20 2145   • clonazePAM (KlonoPIN) tablet 0.5 mg  0.5 mg Oral BID Pravin Michelle DO   0.5 mg at 08/16/20 0850   • diphenhydrAMINE (BENADRYL) capsule 50 mg  50 mg Oral Q6H PRN Pravin Michelle DO       • docusate sodium (COLACE) capsule 100 mg  100 mg Oral BID PRN Pravin Michelle  DO MATEO       • ferrous sulfate tablet 325 mg  325 mg Oral Daily With Breakfast Pravin Michelle DO   325 mg at 08/16/20 0850   • folic acid (FOLVITE) tablet 1 mg  1 mg Oral Daily Pravin Michelle DO   1 mg at 08/16/20 0850   • gabapentin (NEURONTIN) capsule 300 mg  300 mg Oral TID rPavin Michelle DO   300 mg at 08/16/20 0850   • guaiFENesin (MUCINEX) 12 hr tablet 1,200 mg  1,200 mg Oral Q12H Pravin Michelle DO   1,200 mg at 08/16/20 0850   • ipratropium-albuterol (DUO-NEB) nebulizer solution 3 mL  3 mL Nebulization 4x Daily - RT Pravin Michelle DO   3 mL at 08/16/20 1033   • levoFLOXacin (LEVAQUIN) tablet 750 mg  750 mg Oral Q24H Pravin Michelle DO   750 mg at 08/16/20 0850   • lisinopril (PRINIVIL,ZESTRIL) tablet 10 mg  10 mg Oral Q24H Pravin Michelle DO   10 mg at 08/16/20 0850   • metoprolol tartrate (LOPRESSOR) tablet 50 mg  50 mg Oral Q12H Pravin Michelle DO   50 mg at 08/16/20 0850   • ondansetron (ZOFRAN) tablet 4 mg  4 mg Oral Q8H PRN Pravin Michelle DO       • pantoprazole (PROTONIX) EC tablet 40 mg  40 mg Oral Daily Pravin Michelle DO   40 mg at 08/16/20 0850   • sertraline (ZOLOFT) tablet 150 mg  150 mg Oral Nightly Pravin Mcihelle DO   150 mg at 08/15/20 2144   • sodium chloride 0.9 % flush 10 mL  10 mL Intravenous Q12H Pravin Michelle DO   10 mL at 08/15/20 2145   • sodium chloride 0.9 % flush 10 mL  10 mL Intravenous PRN Pravin Michelle DO         Operative/Procedure Notes (most recent note)    No notes of this type exist for this encounter.            Physician Progress Notes (most recent note)      Pravin Michelle DO at 08/15/20 0938              [facility] Medicine Services  INPATIENT PROGRESS NOTE    Patient Name: Lance Rea  Date of Admission: 8/11/2020  Today's Date: 08/15/20  Length of Stay: 3  Primary Care Physician: Reyes Soto MD    Subjective   Chief Complaint: f/u sepsis    HPI:  Patient seen and examined.  Feels  well.  No new complaints.  Breathing is doing well.  No chest pain, no dizziness, no shortness of breath.  Had lost an IV earlier today, no other acute issues or events overnight.    ROS:  All pertinent negatives and positives are as above. All other systems have been reviewed and are negative unless otherwise stated.     Objective    Temp:  [97.4 °F (36.3 °C)-99.1 °F (37.3 °C)] 98.4 °F (36.9 °C)  Heart Rate:  [] 89  Resp:  [16-18] 16  BP: (126-147)/(66-84) 145/81  Physical Exam  GEN: Awake, alert, interactive, in NAD  HEENT: PERRLA, EOMI, Anicteric, Trachea midline  Lungs: Improved breath sounds bilaterally.  No wheezing, rales, rhonchi  Heart: RRR, +S1/s2, no rub  ABD: soft, nt/nd, +BS, no guarding/rebound  Extremities:  no cyanosis, no edema  Skin: no rashes or petechiae  Neuro: AAOx3, no focal deficits  Psych: normal mood & affect        Results Review:  I have reviewed the labs, radiology results, and diagnostic studies.    Laboratory Data:   Results from last 7 days   Lab Units 08/14/20  0420 08/13/20  0252 08/12/20  0554   WBC 10*3/mm3 8.26 12.49* 28.10*   HEMOGLOBIN g/dL 11.3* 11.1* 11.8*   HEMATOCRIT % 34.3* 34.2* 36.1*   PLATELETS 10*3/mm3 165 151 183        Results from last 7 days   Lab Units 08/15/20  0449 08/14/20  0420 08/13/20  0252  08/12/20  0554 08/12/20  0114   SODIUM mmol/L 140 143 142   < > 140 140   POTASSIUM mmol/L 4.1 4.2 4.0   < > 4.1 4.2   CHLORIDE mmol/L 105 111* 112*   < > 105 101   CO2 mmol/L 22.0 21.0* 20.0*   < > 20.0* 20.0*   BUN mg/dL 16 16 21*   < > 31* 32*   CREATININE mg/dL 0.81 0.88 1.78*   < > 4.03* 4.40*   CALCIUM mg/dL 8.9 8.5* 8.1*   < > 7.3* 8.5*   BILIRUBIN mg/dL  --   --   --   --  0.4 0.5   ALK PHOS U/L  --   --   --   --  96 120*   ALT (SGPT) U/L  --   --   --   --  36 48*   AST (SGOT) U/L  --   --   --   --  28 38   GLUCOSE mg/dL 92 96 106*   < > 113* 127*    < > = values in this interval not displayed.       Culture Data:   No results found for: BLOODCX,  URINECX, WOUNDCX, MRSACX, RESPCX, STOOLCX    Radiology Data:   Imaging Results (Last 24 Hours)     ** No results found for the last 24 hours. **          I have reviewed the patient's current medications.     Assessment/Plan     Active Hospital Problems    Diagnosis   • **Sepsis, unspecified organism (CMS/HCC)   • Acute renal failure (CMS/[facility])   • HCAP (healthcare-associated pneumonia)   • Benign essential HTN   • Hyperlipidemia       #1 severe sepsis -from healthcare associated pneumonia.  Improving here on Vanco and cefepime.  Afebrile now and white count has resolved from almost 30 K on arrival.  Sputum now growing Klebsiella good sensitivities.  Lost his IV earlier.  Will change to p.o. Levaquin today and monitor.  Blood culture so far negative.    #2 Healthcare associated pneumonia -recently at [facility].  Still has pneumonia evident on imaging.  As above changed to Levaquin today.  Continue supportive care.    #3 acute renal failure -in setting of sepsis and hypotension.  Creatinine 4.4 on arrival is now normalized with treatment of sepsis and hypotension. Now off ivf    #4 hypertension -by history.  Interestingly enough he is on 3 blood pressure meds and Midodrine at home.  Was hypotensive on arrival but after sepsis treatment has improved.  Did well with metoprolol 25 twice daily yesterday.  Will increase to home dose of 50.  Start low-dose lisinopril.  Continue to monitor.    #5 hyperlipidemia -statin    #6 right hip pain -patient had complained of right hip pain on arrival.  Imaging showed no acute right hip fracture.  Showed an old right femoral nail without evidence of hardware complication.  There is also a mention of a remote right inferior pubic rami fracture.  This was discussed with Dr. Lopez and confirmed that this was an old and healed fracture.  Nothing acute.  Plan on PT and OT.  Weightbearing as tolerated.      Discharge Planning: Continue therapies here.  Plan for discharge to either  [facility] or with [facility] and home health services tomorrow if stable and doing well.    Electronically signed by Pravin Michelle DO, 08/15/20, 09:38.      Electronically signed by Pravin Michelle DO at 08/15/20 1238          Consult Notes (most recent note)      Osmani Waite, PharmD at 08/12/20 0643          Pharmacy Dosing Service  Antimicrobials  Zosyn & Vancomycin    Assessment/Action/Plan:  Zosyn 2.25 gm IV every 8 hours with Pharmacy Note to Please dose ordered. Adjusted dose to extended-infusion dosing of 3.375 gm IV over 4 hours every 12 hours for patient with JOSE L (with CrCl < 20 ml/min) following initial 3.375 gm dose given in ER. Current end date.final dose: 8/15/20 at 0400. Pharmacy will continue to monitor renal function and adjust dose accordingly.    Vancomycin 500 mg IV every 24 hours with Pharmacy Note to Please dose ordered. Adjusted dose to Vancomycin 750 mg IVPB every 48 hours, following a 1,250 mg dose ordered in ER. Nasal PCR for MRSA ordered. Vancomycin random ordered on 8/13 at 0500 (~24 hours post loading dose). Current vancomycin end date/final dose: 8/16/20 at 0600. Pharmacy will monitor renal function and adjust dose accordingly.    Predicted Vt/AUC = 13.3 mcg/ml, 489 mcg*hr/ml (per ClinCalc)                                  13.31 mcg/ml, 487 mcg*hr/ml (per GlobalRph)    (per [employer] - [employer] model)  Loading dose: none  Regimen: 750 mg every 48 hours for 12 doses.  Start time: 08:03 on 08/12/2020  Exposure target: AUC24 (range)400-600 mg/L.hr  AUC24,ss: 514 mg/L.hr  PAUC*: 57 %  Ctrough,ss: 16.4 mg/L  Pconc*: 46 %  Tox.: 12 %     Subjective:  Lance Rea is a 60 y.o. male currently being treated for Pneumonia.    Objective:  Estimated Creatinine Clearance: 16.7 mL/min (A) (by C-G formula based on SCr of 4.4 mg/dL (H)).   Lab Results   Component Value Date    CREATININE 4.40 (H) 08/12/2020   Lab Results   Component Value Date    WBC 27.25 (H) 08/12/2020     Temp Readings from Last  1 Encounters:   08/12/20 98.9 °F (37.2 °C) (Oral)       Culture Results:  Microbiology Results (last 10 days)       Procedure Component Value - Date/Time    COVID PRE-OP / PRE-PROCEDURE SCREENING ORDER (NO ISOLATION) - Swab, Nasopharynx [[id number]] Collected:  08/12/20 0246    Lab Status:  Final result Specimen:  Swab from Nasopharynx Updated:  08/12/20 0311    Narrative:       The following orders were created for panel order COVID PRE-OP / PRE-PROCEDURE SCREENING ORDER (NO ISOLATION) - Swab, Nasopharynx.  Procedure                               Abnormality         Status                     ---------                               -----------         ------                     COVID-19, ABBOTT IN-HOUS...[[id number]]  Normal              Final result                 Please view results for these tests on the individual orders.    COVID-19, ABBOTT IN-HOUSE,NP Swab (NO TRANSPORT MEDIA) 2 HR TAT - Swab, Nasopharynx [[id number]]  (Normal) Collected:  08/12/20 0246    Lab Status:  Final result Specimen:  Swab from Nasopharynx Updated:  08/12/20 0311     COVID19 Not Detected    Narrative:       Fact sheet for providers: https://www.fda.gov/media/625135/download     Fact sheet for patients: https://www.fda.gov/media/040399/download          Current Antimicrobials:   Anti-Infectives (From admission, onward)      Ordered     Dose/Rate Route Frequency Start Stop    08/12/20 0611  vancomycin 750 mg/250 mL 0.9% NS IVPB ([facility])     Note to Pharmacy:  Please dose   Ordering Provider:  Hill Álvarez DO    750 mg  over 60 Minutes Intravenous Every 48 Hours 08/14/20 0600 08/18/20 0559    08/12/20 0548  piperacillin-tazobactam (ZOSYN) 3.375 g in iso-osmotic dextrose 50 ml (premix)     Note to Pharmacy:  Please dose   Ordering Provider:  Hill Álvarez DO    3.375 g  over 4 Hours Intravenous Every 12 Hours 08/12/20 1600 08/15/20 1559    08/12/20 0124  piperacillin-tazobactam (ZOSYN) 3.375 g/100 mL 0.9% NS IVPB (mbp)        Ordering Provider:  Altaf Shipman MD    3.375 g  over 30 Minutes Intravenous Once 08/12/20 0126 08/12/20 0517    08/12/20 0124  vancomycin 1250 mg/250 mL 0.9% NS IVPB ([facility])     Ordering Provider:  Altaf Shipman MD    20 mg/kg × 63.5 kg  over 90 Minutes Intravenous Once 08/12/20 0126              Osmani Waite, Jose Alberto  08/12/20 06:15       Electronically signed by Osmani Waite, PharmD at 08/12/20 0643         Discharge Summary    No notes of this type exist for this encounter.         Discharge Order (From admission, onward)     Start     Ordered    08/16/20 0944  Discharge patient  Once     Comments:  Home w/ daughter and [facility] as previously [facility], pt does not want to go back to [facility]. Need to contact daughter and make sure she is ok with this   Expected Discharge Date:  08/16/20    Discharge Disposition:  Home-Health Care [facility]    Physician of Record for Attribution - Please select from Treatment Team:  JUMANA JORDAN [[id number]]    Review needed by CMO to determine Physician of Record:  No       Question Answer Comment   Physician of Record for Attribution - Please select from Treatment Team JUMANA JORDAN    Review needed by CMO to determine Physician of Record No        08/16/20 0986

## 2020-08-16 NOTE — PLAN OF CARE
Problem: Patient Care Overview  Goal: Plan of Care Review  Outcome: Ongoing (interventions implemented as appropriate)  Flowsheets (Taken 8/16/2020 0417)  Progress: improving  Plan of Care Reviewed With: patient  Outcome Summary: A&O X 4. No c/o pain. Pt slept all night. VSS. Safety maintained. Will continue to monitor.

## 2020-08-16 NOTE — DISCHARGE SUMMARY
[facility] Medicine Services  DISCHARGE SUMMARY       Date of Admission: 8/11/2020  Date of Discharge:  8/16/2020  Primary Care Physician: Reyes Soto MD    Presenting Problem/History of Present Illness:  Acute renal failure, unspecified acute renal failure type (CMS/[facility]) [N17.9]     Final Discharge Diagnoses:  Active Hospital Problems    Diagnosis   • **Sepsis, unspecified organism (CMS/[facility])   • Acute renal failure (CMS/[facility])   • HCAP (healthcare-associated pneumonia)   • Benign essential HTN   • Hyperlipidemia       Consults: none    Procedures Performed: none    Pertinent Test Results:   Procedure Component Value Units Date/Time   XR Hip With or Without Pelvis 2 - 3 View Right [[id number]] Meet as Reviewed   Order Status: Completed Collected: 08/12/20 0717    Updated: 08/12/20 0722   Narrative:     XR HIP W OR WO PELVIS 2-3 VIEW RIGHT-     Indication: hip pain; N17.9-Acute kidney failure, unspecified;  A41.9-Sepsis, unspecified organism     Comparison: 9/12/2017     Findings:     No acute fracture or dislocation.   Partially imaged RIGHT femoral IM nail with dynamic head/neck screw.   Pubic symphysis and SI joints are maintained.   Remote RIGHT inferior pubic rami fracture is noted.   A few pelvic phleboliths are present.      Impression:     Impression:     No acute osseous finding.  This report was finalized on 08/12/2020 07:19 by Dr. Tiburcio Lopez MD.   CT Lower Extremity Right Without Contrast [[id number]] Meet as Reviewed   Order Status: Completed Collected: 08/12/20 0727    Updated: 08/12/20 0734   Narrative:     CT of the RIGHT hip without contrast     Indication: RIGHT hip pain     Comparison: Same day radiograph     Automated exposure control was also utilized to decrease patient  radiation dose. Dose length product (mGy cm): 93     Findings:     Postoperative change of RIGHT femoral IM nail with dynamic head/neck  screw for transfixion of remote fracture  which appears healed. No  evidence of hardware fracture or significant loosening. No acute femoral  head/neck fracture. Remote healed RIGHT inferior pubic rami fracture is  noted. The pubic symphysis is maintained. No acute finding in the  partially imaged pelvis.      Impression:     Impression:  1.  No acute fracture.  2.  Prior RIGHT femoral IM nail without evidence of hardware  complication.     These findings are in agreement with the critical and emergent findings  from the [facility] preliminary report.  This report was finalized on 08/12/2020 07:31 by Dr. Tiburcio Lopez MD.   CT Abdomen Pelvis Without Contrast [[id number]] Meet as Reviewed   Order Status: Completed Collected: 08/12/20 0746    Updated: 08/12/20 0754   Narrative:     CT abdomen pelvis without contrast     Indication: Back pain, nausea, vomiting     Comparison: 4/22/2012     DOSE LENGTH PRODUCT: 183 mGy cm. Automated exposure control was also  utilized to decrease patient radiation dose.     Findings:     LEFT lower lobe consolidation.     Shrunken nodular liver. Prior cholecystectomy. Dilated common bile duct  measuring 12 mm, unchanged from 2012. Spleen is enlarged measuring 13.1  cm craniocaudal dimension. Pancreas and adrenal glands appear  unremarkable.     No urolithiasis or hydronephrosis. No focal urinary bladder wall  thickening. Moderate size hiatal hernia. No abnormal bowel distention or  adjacent inflammation. Normal appendix. No ascites or free pelvic fluid.  No pelvic mass or pelvic collection.      Nonaneurysmal atherosclerotic abdominal aorta. No enlarged abdominal or  pelvic lymph nodes. No acute soft tissue finding. Multilevel lumbar  spine degenerative change and levoscoliotic curvature. No acute osseous  finding.      Impression:     Impression:  1.  LEFT lower lobe consolidation, likely representing pneumonia.  2.  No evidence of bowel obstruction or active bowel inflammation.  3.  Moderate size hiatal hernia.  4.  Liver  appears cirrhotic. Mild splenomegaly.     These findings are in agreement with the critical and emergent findings  from the [facility] preliminary report.  This report was finalized on 08/12/2020 07:51 by Dr. Tiburcio Lopez MD.   XR Chest 1 View [[id number]] Meet as Reviewed   Order Status: Completed Collected: 08/12/20 0659    Updated: 08/12/20 0703   Narrative:     Frontal upright radiograph of the chest 8/12/2020 1:02 AM CDT     COMPARISON: September 12, 2017     HISTORY hypotension.     FINDINGS:   Hyperinflation and chronic changes noted in the pulmonary parenchyma. No  infiltrates are identified.. The cardiomediastinal silhouette and  pulmonary vascularity are within normal limits.      The osseous structures and surrounding soft tissues demonstrate no acute  abnormality.      Impression:     1. COPD no acute cardiopulmonary process.        This report was finalized on 08/12/2020 07:00 by Dr. Kevin Gould MD.         Chief Complaint on Day of Discharge: Follow-up pneumonia/sepsis    History of Present Illness on Day of Discharge:   Patient seen and examined.  He has been doing well and feeling well.  On room air.  Denies overt shortness of breath.  No chest pain.  Tolerating p.o.  Wanting to go home today.    Hospital Course:  The patient is a 60 y.o. male who reportedly was recently hospitalized at [facility] for pneumonia on Wednesday 8/5 and discharged Friday 8/7.  Apparently sent home on Omnicef.  Then presented to our hospital on 8/12 because he was lethargic and not acting right.  In the ER he was found to be hypotensive in the 70s systolic with a white count of almost 28,000 and JOSE L with a creatinine of 4.4.  He was started on sepsis bolus with blood cultures drawn.  Admitted on broad-spectrum antibiotics with Vanco and cefepime.  Spent majority of the first day hypotensive.  He was not initially on pressors because 1 of his home meds was Midodrine 3 times daily and it was thought he was chronically  "hypotensive.  Later found out he is also on 3 different blood pressure meds.  Not sure why he has 3 home bp meds and midodrine.  Eventually however his blood pressure started to improve and he never required pressors.  Once pressure got back up to 150s he was resumed on home p.o. blood pressure meds to include metoprolol and lisinopril.  Eventually his sputum grew Klebsiella and he was transitioned to oral Levaquin.  His blood cultures are negative.  His white count has resolved.  His renal function has normalized.  He is afebrile on room air.  At this point he is much improved and doing well with therapies.  Recommend home health services.  We will discharge him home today with his daughter and home with services.  Medications as below.  Follow-up with PCP.    Condition on Discharge: stable/improved on room air.  Tolerating blood pressure meds fine.    Physical Exam on Discharge:  /80 (BP Location: Right arm, Patient Position: Sitting)   Pulse 70   Temp 98.5 °F (36.9 °C) (Oral)   Resp 16   Ht 170.2 cm (67\")   Wt 63.3 kg (139 lb 9.6 oz)   SpO2 94%   BMI 21.86 kg/m²   Physical Exam  GEN: Awake, alert, interactive, in NAD  HEENT: PERRLA, EOMI, Anicteric, Trachea midline  Lungs: Improved breath sounds bilaterally.  No wheezing, rales, rhonchi  Heart: RRR, +S1/s2, no rub  ABD: soft, nt/nd, +BS, no guarding/rebound  Extremities:  no cyanosis, no edema  Skin: no rashes or petechiae  Neuro: AAOx3, no focal deficits  Psych: normal mood & affect    Discharge Disposition:  Home-Health Care [facility]    Discharge Medications:     Discharge Medications      New Medications      Instructions Start Date   levoFLOXacin 750 MG tablet  Commonly known as:  LEVAQUIN   750 mg, Oral, Every 24 Hours   Start Date:  August 17, 2020        Changes to Medications      Instructions Start Date   furosemide 40 MG tablet  Commonly known as:  LASIX  What changed:    · when to take this  · reasons to take this   40 mg, Oral, Daily PRN      "   lisinopril 20 MG tablet  Commonly known as:  PRINROXANNAILZESTRIL  What changed:  how much to take   10 mg, Oral, Daily         Continue These Medications      Instructions Start Date   acetaminophen 325 MG tablet  Commonly known as:  TYLENOL   650 mg, Oral, Every 6 Hours PRN      Acidophilus Lactobacillus capsule   1 capsule, Oral, Daily      albuterol sulfate  (90 Base) MCG/ACT inhaler  Commonly known as:  PROVENTIL HFA;VENTOLIN HFA;PROAIR HFA   2 puffs, Inhalation, Every 4 Hours PRN      aspirin 81 MG EC tablet   81 mg, Oral, Daily      benzonatate 100 MG capsule  Commonly known as:  TESSALON   200 mg, Oral, 3 Times Daily PRN      clonazePAM 0.5 MG tablet  Commonly known as:  KlonoPIN   0.5 mg, Oral, 2 Times Daily      diclofenac 1 % gel gel  Commonly known as:  VOLTAREN   5 g, Topical, 3 Times Daily PRN      docusate sodium 100 MG capsule  Commonly known as:  COLACE   100 mg, Oral, 2 Times Daily      ferrous sulfate 325 (65 FE) MG tablet   325 mg, Oral, Daily With Breakfast      folic acid 1 MG tablet  Commonly known as:  FOLVITE   1 mg, Oral, Daily      gabapentin 300 MG capsule  Commonly known as:  NEURONTIN   300 mg, Oral, 3 Times Daily      ipratropium 0.02 % nebulizer solution  Commonly known as:  ATROVENT   500 mcg, Nebulization, Every 6 Hours      magnesium hydroxide 400 MG/5ML suspension  Commonly known as:  MILK OF MAGNESIA   30 mL, Oral, Daily PRN      metoprolol succinate XL 50 MG 24 hr tablet  Commonly known as:  TOPROL-XL   50 mg, Oral, Daily      ondansetron 4 MG tablet  Commonly known as:  ZOFRAN   4 mg, Oral, Every 6 Hours PRN      pantoprazole 40 MG EC tablet  Commonly known as:  PROTONIX   40 mg, Oral, 2 times daily      Prevalite 4 g packet  Generic drug:  cholestyramine light   4 g, Oral, 2 Times Daily      sertraline 100 MG tablet  Commonly known as:  ZOLOFT   150 mg, Oral, Nightly      simvastatin 10 MG tablet  Commonly known as:  ZOCOR   10 mg, Oral, Nightly      sucralfate 1 g  tablet  Commonly known as:  CARAFATE   1 g, Oral, 4 Times Daily Before Meals & Nightly      thiamine 100 MG tablet  Commonly known as:  VITAMIN B-1   100 mg, Oral, Daily      zolpidem 5 MG tablet  Commonly known as:  AMBIEN   5 mg, Oral, Nightly         Stop These Medications    hydrALAZINE 10 MG tablet  Commonly known as:  APRESOLINE     midodrine 5 MG tablet  Commonly known as:  PROAMATINE     nicotine 7 MG/24HR patch  Commonly known as:  NICODERM CQ     traMADol 50 MG tablet  Commonly known as:  ULTRAM            Discharge Diet:    Dietary Orders (From admission, onward)     Start     Ordered    08/12/20 0534  Diet Regular  Diet Effective Now     Question:  Diet Texture / Consistency  Answer:  Regular    08/12/20 0533                Activity at Discharge:    Increase to baseline as tolerated    Discharge Care Plan/Instructions:   Discharge with home with services.  Follow-up with PCP    Follow-up Appointments:   No future appointments.    Test Results Pending at Discharge: none    Electronically signed by Pravin Michelle DO, 08/16/20, 09:49.    Time: 34 minutes

## 2020-08-16 NOTE — PROGRESS NOTES
Continued Stay Note  [facility]     Patient Name: Lance Rea  MRN: [id number]  Today's Date: 8/16/2020    Admit Date: 8/11/2020    Discharge Plan     Row Name 08/16/20 1254       Plan    Plan  Home with [facility]    Provided Post Acute Provider List?  Yes    Post Acute Provider List  Home Health    Provided Post Acute Provider Quality & Resource List?  Yes    Post Acute Provider Quality and Resource List  Home Health    Delivered To  Patient    Method of Delivery  In person    Patient/Family in Agreement with Plan  yes    Final Discharge Disposition Code  06 - home with home health care    Final Note  Pt's daughter has agreed for pt to come live with her.  SW has setup [facility]; referral faxed and spoke to Aung on call.  Pt to [place] today.  PEYMAN Nails.         Discharge Codes    No documentation.       Expected Discharge Date and Time     Expected Discharge Date Expected Discharge Time    Aug 16, 2020             PEYMAN Mckinney

## 2020-08-16 NOTE — THERAPY DISCHARGE NOTE
Acute Care - Physical Therapy Discharge Summary  [facility]       Patient Name: Lance Rea  : 1960  MRN: [id number]    Today's Date: 2020  Onset of Illness/Injury or Date of Surgery: 20       Referring Physician: Dr. Michelle      Admit Date: 2020      PT Recommendation and Plan    Visit Dx:    ICD-10-CM ICD-9-CM   1. Acute renal failure, unspecified acute renal failure type (CMS/[facility]) N17.9 584.9   2. Sepsis, due to unspecified organism, unspecified whether acute organ dysfunction present (CMS/[facility]) A41.9 038.9     995.91   3. Decreased activities of daily living (ADL) Z78.9 V49.89   4. Impaired mobility Z74.09 799.89       Outcome Measures     Row Name 08/15/20 1018 20 1328          How much help from another person do you currently need...    Turning from your back to your side while in flat bed without using bedrails?  3  -BRITTANI  --     Moving from lying on back to sitting on the side of a flat bed without bedrails?  3  -BRITTANI  --     Moving to and from a bed to a chair (including a wheelchair)?  3  -BRITTANI  --     Standing up from a chair using your arms (e.g., wheelchair, bedside chair)?  3  -BRITTANI  --     Climbing 3-5 steps with a railing?  3  -BRITTANI  --     To walk in hospital room?  3  -BRITTANI  --     AM-PAC 6 Clicks Score (PT)  18  -BRITTANI  --        How much help from another is currently needed...    Putting on and taking off regular lower body clothing?  --  3  -CJ     Bathing (including washing, rinsing, and drying)  --  3  -CJ     Toileting (which includes using toilet bed pan or urinal)  --  3  -CJ     Putting on and taking off regular upper body clothing  --  4  -CJ     Taking care of personal grooming (such as brushing teeth)  --  4  -CJ     Eating meals  --  4  -CJ     AM-PAC 6 Clicks Score (OT)  --  21  -CJ        Functional Assessment    Outcome Measure Options  AM-PAC 6 Clicks Basic Mobility (PT)  -BRITTANI  AM-PAC 6 Clicks Daily Activity (OT)  -       User Key  (r) = Recorded By, (t) = Taken  By, (c) = Cosigned By    Initials Name Provider Type    Fredy Higuera, MIGUEL ANGEL/L Occupational Therapy Assistant    Collin Perkins PTA Physical Therapy Assistant              Rehab Goal Summary     Row Name 08/16/20 1525             Bed Mobility Goal 1 (PT)    Activity/Assistive Device (Bed Mobility Goal 1, PT)  sit to supine/supine to sit  -NW      [place] Level/Cues Needed (Bed Mobility Goal 1, PT)  supervision required  -NW      Time Frame (Bed Mobility Goal 1, PT)  long term goal (LTG);10 days  -NW      Progress/Outcomes (Bed Mobility Goal 1, PT)  goal met  -NW         Transfer Goal 1 (PT)    Activity/Assistive Device (Transfer Goal 1, PT)  sit-to-stand/stand-to-sit;bed-to-chair/chair-to-bed;walker, rolling  -NW      [place] Level/Cues Needed (Transfer Goal 1, PT)  supervision required  -NW      Time Frame (Transfer Goal 1, PT)  long term goal (LTG);10 days  -NW      Progress/Outcome (Transfer Goal 1, PT)  goal met  -NW         Gait Training Goal 1 (PT)    Activity/Assistive Device (Gait Training Goal 1, PT)  gait (walking locomotion);assistive device use;decrease fall risk;improve balance and speed;increase endurance/gait distance  -NW      [place] Level (Gait Training Goal 1, PT)  supervision required  -NW      Distance (Gait Goal 1, PT)  200  -NW      Time Frame (Gait Training Goal 1, PT)  long term goal (LTG);10 days  -NW      Progress/Outcome (Gait Training Goal 1, PT)  goal not met  -NW        User Key  (r) = Recorded By, (t) = Taken By, (c) = Cosigned By    Initials Name Provider Type Discipline    NW Renetta Byrd PTA Physical Therapy Assistant PT              PT Discharge Summary  Anticipated Discharge Disposition (PT): home with home health  Reason for Discharge: Discharge from facility  Outcomes Achieved: Refer to plan of care for updates on goals achieved  Discharge Destination: Home with home health      Renetta Byrd PTA   8/16/2020

## 2020-08-16 NOTE — PROGRESS NOTES
Continued Stay Note   [place]     Patient Name: Lance Rea  MRN: [id number]  Today's Date: 8/16/2020    Admit Date: 8/11/2020    Discharge Plan     Row Name 08/16/20 0953       Plan    Patient/Family in Agreement with Plan  yes    Plan Comments  Pt wanting to dc home with daughter and  services.  SW has attempted to call daughter with no answer or voicemail [phone].  SW will continue to try to reach her.  PEYMAN Nails.         Discharge Codes    No documentation.       Expected Discharge Date and Time     Expected Discharge Date Expected Discharge Time    Aug 16, 2020             PEYMAN Mckinney

## 2020-08-17 ENCOUNTER — READMISSION MANAGEMENT (OUTPATIENT)
Dept: CALL CENTER | Facility: HOSPITAL | Age: 60
End: 2020-08-17

## 2020-08-17 LAB
BACTERIA SPEC AEROBE CULT: NORMAL
BACTERIA SPEC AEROBE CULT: NORMAL

## 2020-08-17 NOTE — PAYOR COMM NOTE
"[facility] 8-16-20  [id number]    Katy Rea (60 y.o. Male)     Date of Birth Social Security Number Address Home Phone MRN    1960  [place] [address] [id number]    Hinduism Marital Status          [facility] Single       Admission Date Admission Type Admitting Provider Attending Provider Department, Room/Bed    8/11/20 Emergency Pravin Michelle DO  [facility] 4C, 461/1    Discharge Date Discharge Disposition Discharge Destination        8/16/2020 Home-Health Care [facility]             Attending Provider:  (none)   Allergies:  Codeine    Isolation:  None   Infection:  None   Code Status:  Prior    Ht:  170.2 cm (67\")   Wt:  63.3 kg (139 lb 9.6 oz)    Admission Cmt:  None   Principal Problem:  Sepsis, unspecified organism (CMS/HCC) [A41.9]                 Active Insurance as of 8/11/2020     Primary Coverage     Payor Plan Insurance Group Employer/Plan Group    ANTHEM MEDICAID ANTHEM MEDICAID [id number]     Payor Plan Address Payor Plan Phone Number Payor Plan Fax Number Effective Dates    [address] [address] [phone]  4/2/2020 - None Entered    [facility] [id number]       Subscriber Name Subscriber Birth Date Member ID       KATY REA 1960 [id number]                 Emergency Contacts      (Rel.) Home Phone Work Phone Mobile Phone    Tea Lux (Daughter) [phone] -- [phone]               Discharge Summary      Pravin Michelle DO at 08/16/20 0949              [facility]  DISCHARGE SUMMARY       Date of Admission: 8/11/2020  Date of Discharge:  8/16/2020  Primary Care Physician: Reyes Soto MD    Presenting Problem/History of Present Illness:  Acute renal failure, unspecified acute renal failure type (CMS/HCC) [N17.9]     Final Discharge Diagnoses:  Active Hospital Problems    Diagnosis   • **Sepsis, unspecified organism (CMS/HCC)   • Acute renal failure " (CMS/[facility])   • HCAP (healthcare-associated pneumonia)   • Benign essential HTN   • Hyperlipidemia       Consults: none    Procedures Performed: none    Pertinent Test Results:   Procedure Component Value Units Date/Time   XR Hip With or Without Pelvis 2 - 3 View Right [[id number]] Meet as Reviewed   Order Status: Completed Collected: 08/12/20 0717    Updated: 08/12/20 0722   Narrative:     XR HIP W OR WO PELVIS 2-3 VIEW RIGHT-     Indication: hip pain; N17.9-Acute kidney failure, unspecified;  A41.9-Sepsis, unspecified organism     Comparison: 9/12/2017     Findings:     No acute fracture or dislocation.   Partially imaged RIGHT femoral IM nail with dynamic head/neck screw.   Pubic symphysis and SI joints are maintained.   Remote RIGHT inferior pubic rami fracture is noted.   A few pelvic phleboliths are present.      Impression:     Impression:     No acute osseous finding.  This report was finalized on 08/12/2020 07:19 by Dr. Tiburcio Lopez MD.   CT Lower Extremity Right Without Contrast [[id number]] Meet as Reviewed   Order Status: Completed Collected: 08/12/20 0727    Updated: 08/12/20 0734   Narrative:     CT of the RIGHT hip without contrast     Indication: RIGHT hip pain     Comparison: Same day radiograph     Automated exposure control was also utilized to decrease patient  radiation dose. Dose length product (mGy cm): 93     Findings:     Postoperative change of RIGHT femoral IM nail with dynamic head/neck  screw for transfixion of remote fracture which appears healed. No  evidence of hardware fracture or significant loosening. No acute femoral  head/neck fracture. Remote healed RIGHT inferior pubic rami fracture is  noted. The pubic symphysis is maintained. No acute finding in the  partially imaged pelvis.      Impression:     Impression:  1.  No acute fracture.  2.  Prior RIGHT femoral IM nail without evidence of hardware  complication.     These findings are in agreement with the critical and emergent  findings  from the [facility] preliminary report.  This report was finalized on 08/12/2020 07:31 by Dr. Tiburcio Lopez MD.   CT Abdomen Pelvis Without Contrast [[id number]] Meet as Reviewed   Order Status: Completed Collected: 08/12/20 0746    Updated: 08/12/20 0754   Narrative:     CT abdomen pelvis without contrast     Indication: Back pain, nausea, vomiting     Comparison: 4/22/2012     DOSE LENGTH PRODUCT: 183 mGy cm. Automated exposure control was also  utilized to decrease patient radiation dose.     Findings:     LEFT lower lobe consolidation.     Shrunken nodular liver. Prior cholecystectomy. Dilated common bile duct  measuring 12 mm, unchanged from 2012. Spleen is enlarged measuring 13.1  cm craniocaudal dimension. Pancreas and adrenal glands appear  unremarkable.     No urolithiasis or hydronephrosis. No focal urinary bladder wall  thickening. Moderate size hiatal hernia. No abnormal bowel distention or  adjacent inflammation. Normal appendix. No ascites or free pelvic fluid.  No pelvic mass or pelvic collection.      Nonaneurysmal atherosclerotic abdominal aorta. No enlarged abdominal or  pelvic lymph nodes. No acute soft tissue finding. Multilevel lumbar  spine degenerative change and levoscoliotic curvature. No acute osseous  finding.      Impression:     Impression:  1.  LEFT lower lobe consolidation, likely representing pneumonia.  2.  No evidence of bowel obstruction or active bowel inflammation.  3.  Moderate size hiatal hernia.  4.  Liver appears cirrhotic. Mild splenomegaly.     These findings are in agreement with the critical and emergent findings  from the [facility] preliminary report.  This report was finalized on 08/12/2020 07:51 by Dr. Tiburcio Lopez MD.   XR Chest 1 View [[id number]] Meet as Reviewed   Order Status: Completed Collected: 08/12/20 0659    Updated: 08/12/20 0703   Narrative:     Frontal upright radiograph of the chest 8/12/2020 1:02 AM CDT     COMPARISON: September 12,  2017     HISTORY hypotension.     FINDINGS:   Hyperinflation and chronic changes noted in the pulmonary parenchyma. No  infiltrates are identified.. The cardiomediastinal silhouette and  pulmonary vascularity are within normal limits.      The osseous structures and surrounding soft tissues demonstrate no acute  abnormality.      Impression:     1. COPD no acute cardiopulmonary process.        This report was finalized on 08/12/2020 07:00 by Dr. Kevin Gould MD.         Chief Complaint on Day of Discharge: Follow-up pneumonia/sepsis    History of Present Illness on Day of Discharge:   Patient seen and examined.  He has been doing well and feeling well.  On room air.  Denies overt shortness of breath.  No chest pain.  Tolerating p.o.  Wanting to go home today.    Hospital Course:  The patient is a 60 y.o. male who reportedly was recently hospitalized at [facility] for pneumonia on Wednesday 8/5 and discharged Friday 8/7.  Apparently sent home on Omnicef.  Then presented to our hospital on 8/12 because he was lethargic and not acting right.  In the ER he was found to be hypotensive in the 70s systolic with a white count of almost 28,000 and JOSE L with a creatinine of 4.4.  He was started on sepsis bolus with blood cultures drawn.  Admitted on broad-spectrum antibiotics with Vanco and cefepime.  Spent majority of the first day hypotensive.  He was not initially on pressors because 1 of his home meds was Midodrine 3 times daily and it was thought he was chronically hypotensive.  Later found out he is also on 3 different blood pressure meds.  Not sure why he has 3 home bp meds and midodrine.  Eventually however his blood pressure started to improve and he never required pressors.  Once pressure got back up to 150s he was resumed on home p.o. blood pressure meds to include metoprolol and lisinopril.  Eventually his sputum grew Klebsiella and he was transitioned to oral Levaquin.  His blood cultures are negative.   "His white count has resolved.  His renal function has normalized.  He is afebrile on room air.  At this point he is much improved and doing well with therapies.  Recommend home health services.  We will discharge him home today with his daughter and home with services.  Medications as below.  Follow-up with PCP.    Condition on Discharge: stable/improved on room air.  Tolerating blood pressure meds fine.    Physical Exam on Discharge:  /80 (BP Location: Right arm, Patient Position: Sitting)   Pulse 70   Temp 98.5 °F (36.9 °C) (Oral)   Resp 16   Ht 170.2 cm (67\")   Wt 63.3 kg (139 lb 9.6 oz)   SpO2 94%   BMI 21.86 kg/m²    Physical Exam  GEN: Awake, alert, interactive, in NAD  HEENT: PERRLA, EOMI, Anicteric, Trachea midline  Lungs: Improved breath sounds bilaterally.  No wheezing, rales, rhonchi  Heart: RRR, +S1/s2, no rub  ABD: soft, nt/nd, +BS, no guarding/rebound  Extremities:  no cyanosis, no edema  Skin: no rashes or petechiae  Neuro: AAOx3, no focal deficits  Psych: normal mood & affect    Discharge Disposition:  [facility]-Health Care [facility]    Discharge Medications:     Discharge Medications      New Medications      Instructions Start Date   levoFLOXacin 750 MG tablet  Commonly known as:  LEVAQUIN   750 mg, Oral, Every 24 Hours   Start Date:  August 17, 2020        Changes to Medications      Instructions Start Date   furosemide 40 MG tablet  Commonly known as:  LASIX  What changed:    · when to take this  · reasons to take this   40 mg, Oral, Daily PRN      lisinopril 20 MG tablet  Commonly known as:  PRINIVIL,ZESTRIL  What changed:  how much to take   10 mg, Oral, Daily         Continue These Medications      Instructions Start Date   acetaminophen 325 MG tablet  Commonly known as:  TYLENOL   650 mg, Oral, Every 6 Hours PRN      Acidophilus Lactobacillus capsule   1 capsule, Oral, Daily      albuterol sulfate  (90 Base) MCG/ACT inhaler  Commonly known as:  PROVENTIL HFA;VENTOLIN HFA;PROAIR HFA   " 2 puffs, Inhalation, Every 4 Hours PRN      aspirin 81 MG EC tablet   81 mg, Oral, Daily      benzonatate 100 MG capsule  Commonly known as:  TESSALON   200 mg, Oral, 3 Times Daily PRN      clonazePAM 0.5 MG tablet  Commonly known as:  KlonoPIN   0.5 mg, Oral, 2 Times Daily      diclofenac 1 % gel gel  Commonly known as:  VOLTAREN   5 g, Topical, 3 Times Daily PRN      docusate sodium 100 MG capsule  Commonly known as:  COLACE   100 mg, Oral, 2 Times Daily      ferrous sulfate 325 (65 FE) MG tablet   325 mg, Oral, Daily With Breakfast      folic acid 1 MG tablet  Commonly known as:  FOLVITE   1 mg, Oral, Daily      gabapentin 300 MG capsule  Commonly known as:  NEURONTIN   300 mg, Oral, 3 Times Daily      ipratropium 0.02 % nebulizer solution  Commonly known as:  ATROVENT   500 mcg, Nebulization, Every 6 Hours      magnesium hydroxide 400 MG/5ML suspension  Commonly known as:  MILK OF MAGNESIA   30 mL, Oral, Daily PRN      metoprolol succinate XL 50 MG 24 hr tablet  Commonly known as:  TOPROL-XL   50 mg, Oral, Daily      ondansetron 4 MG tablet  Commonly known as:  ZOFRAN   4 mg, Oral, Every 6 Hours PRN      pantoprazole 40 MG EC tablet  Commonly known as:  PROTONIX   40 mg, Oral, 2 times daily      Prevalite 4 g packet  Generic drug:  cholestyramine light   4 g, Oral, 2 Times Daily      sertraline 100 MG tablet  Commonly known as:  ZOLOFT   150 mg, Oral, Nightly      simvastatin 10 MG tablet  Commonly known as:  ZOCOR   10 mg, Oral, Nightly      sucralfate 1 g tablet  Commonly known as:  CARAFATE   1 g, Oral, 4 Times Daily Before Meals & Nightly      thiamine 100 MG tablet  Commonly known as:  VITAMIN B-1   100 mg, Oral, Daily      zolpidem 5 MG tablet  Commonly known as:  AMBIEN   5 mg, Oral, Nightly         Stop These Medications    hydrALAZINE 10 MG tablet  Commonly known as:  APRESOLINE     midodrine 5 MG tablet  Commonly known as:  PROAMATINE     nicotine 7 MG/24HR patch  Commonly known as:  NICODERM CQ      traMADol 50 MG tablet  Commonly known as:  ULTRAM            Discharge Diet:    Dietary Orders (From admission, onward)     Start     Ordered    08/12/20 0534  Diet Regular  Diet Effective Now     Question:  Diet Texture / Consistency  Answer:  Regular    08/12/20 0533                Activity at Discharge:    Increase to baseline as tolerated    Discharge Care Plan/Instructions:   Discharge with home with services.  Follow-up with PCP    Follow-up Appointments:   No future appointments.    Test Results Pending at Discharge: none    Electronically signed by Pravin Michelle DO, 08/16/20, 09:49.    Time: 34 minutes          Electronically signed by Pravin Michelle DO at 08/16/20 8460

## 2020-08-17 NOTE — OUTREACH NOTE
Prep Survey      Responses   [facility] patient discharged from?  [place]   Is LACE score < 7 ?  No   Eligibility  Readm Mgmt   Discharge diagnosis  sepsis,  acute renal failure,  HCAP,  HTN,  HLD   COVID-19 Test Status  Negative   Does the patient have one of the following disease processes/diagnoses(primary or secondary)?  Sepsis   Does the patient have Home health ordered?  Yes   What is the Home health agency?   [facility]   Is there a DME ordered?  No   Comments regarding appointments  office to call   Medication alerts for this patient  see AVS   Prep survey completed?  Yes          Ania Lux RN

## 2020-08-17 NOTE — THERAPY DISCHARGE NOTE
Acute Care - Occupational Therapy Discharge Summary  [facility]     Patient Name: Lance Rea  : 1960  MRN: [id number]    Today's Date: 2020  Onset of Illness/Injury or Date of Surgery: 20    Date of Referral to OT: 20  Referring Physician: Dr. Michelle      Admit Date: 2020        OT Recommendation and Plan    Visit Dx:    ICD-10-CM ICD-9-CM   1. Acute renal failure, unspecified acute renal failure type (CMS/[facility]) N17.9 584.9   2. Sepsis, due to unspecified organism, unspecified whether acute organ dysfunction present (CMS/[facility]) A41.9 038.9     995.91   3. Decreased activities of daily living (ADL) Z78.9 V49.89   4. Impaired mobility Z74.09 799.89               Rehab Goal Summary     Row Name 20 0800             Bathing Goal 1 (OT)    Activity/Assistive Device (Bathing Goal 1, OT)  bathing skills, all  -TS      Walls Level/Cues Needed (Bathing Goal 1, OT)  independent  -TS      Time Frame (Bathing Goal 1, OT)  long term goal (LTG);by discharge  -TS      Progress/Outcomes (Bathing Goal 1, OT)  goal not met  -TS         Toileting Goal 1 (OT)    Activity/Device (Toileting Goal 1, OT)  toileting skills, all;commode  -TS      Walls Level/Cues Needed (Toileting Goal 1, OT)  independent  -TS      Time Frame (Toileting Goal 1, OT)  long term goal (LTG);by discharge  -TS      Progress/Outcome (Toileting Goal 1, OT)  goal not met  -TS          Activity Tolerance Goal 1 (OT)    Activity Tolerance Goal 1 (OT)  Pt will participate in functional task in standing for 10 minutes to address decreased activity tolerance and increase independence with adls  -TS      Activity Level (Endurance Goal 1, OT)  10 min activity;O2 sat >/ equal to 88%  -TS      Time Frame (Activity Tolerance Goal 1, OT)  long term goal (LTG);by discharge  -TS      Progress/Outcome (Activity Tolerance Goal 1, OT)  goal not met  -TS        User Key  (r) = Recorded By, (t) = Taken By, (c) = Cosigned By    Initials  Name Provider Type Discipline     Beth Jimenez COTA/L Occupational Therapy Assistant OT          Outcome Measures     Row Name 08/15/20 1018 08/14/20 1328          How much help from another person do you currently need...    Turning from your back to your side while in flat bed without using bedrails?  3  -BRITTANI  --     Moving from lying on back to sitting on the side of a flat bed without bedrails?  3  -BRITTANI  --     Moving to and from a bed to a chair (including a wheelchair)?  3  -BRITTANI  --     Standing up from a chair using your arms (e.g., wheelchair, bedside chair)?  3  -BRITTANI  --     Climbing 3-5 steps with a railing?  3  -BRITTANI  --     To walk in hospital room?  3  -BRITTANI  --     AM-PAC 6 Clicks Score (PT)  18  -BRITTANI  --        How much help from another is currently needed...    Putting on and taking off regular lower body clothing?  --  3  -CJ     Bathing (including washing, rinsing, and drying)  --  3  -CJ     Toileting (which includes using toilet bed pan or urinal)  --  3  -CJ     Putting on and taking off regular upper body clothing  --  4  -CJ     Taking care of personal grooming (such as brushing teeth)  --  4  -CJ     Eating meals  --  4  -CJ     AM-PAC 6 Clicks Score (OT)  --  21  -CJ        Functional Assessment    Outcome Measure Options  AM-PAC 6 Clicks Basic Mobility (PT)  -BRITTANI  AM-PAC 6 Clicks Daily Activity (OT)  -       User Key  (r) = Recorded By, (t) = Taken By, (c) = Cosigned By    Initials Name Provider Type    CJ Fredy Perdomo COTA/L Occupational Therapy Assistant    Collin Perkins, PTA Physical Therapy Assistant          Therapy Suggested Charges     Code   Minutes Charges    None                 OT Discharge Summary  Reason for Discharge: Discharge from facility  Outcomes Achieved: Refer to plan of care for updates on goals achieved  Discharge Destination: Home with assist, Home with home health      JOHN Carson  8/17/2020

## 2020-08-20 ENCOUNTER — READMISSION MANAGEMENT (OUTPATIENT)
Dept: CALL CENTER | Facility: HOSPITAL | Age: 60
End: 2020-08-20

## 2020-08-20 NOTE — OUTREACH NOTE
Sepsis Week 1 Survey      Responses   [facility] patient discharged from?  [place]   COVID-19 Test Status  Negative   Does the patient have one of the following disease processes/diagnoses(primary or secondary)?  Sepsis   Is there a successful TCM telephone encounter documented?  No   Week 1 attempt successful?  No   Unsuccessful attempts  Attempt 1          Art Nunes RN

## 2020-08-24 ENCOUNTER — READMISSION MANAGEMENT (OUTPATIENT)
Dept: CALL CENTER | Facility: HOSPITAL | Age: 60
End: 2020-08-24

## 2020-08-24 NOTE — OUTREACH NOTE
Sepsis Week 1 Survey      Responses   [facility] patient discharged from?  [place]   COVID-19 Test Status  Negative   Does the patient have one of the following disease processes/diagnoses(primary or secondary)?  Sepsis   Is there a successful TCM telephone encounter documented?  No   Week 1 attempt successful?  No   Unsuccessful attempts  Attempt 2          Pham Tom RN

## 2020-08-25 ENCOUNTER — READMISSION MANAGEMENT (OUTPATIENT)
Dept: CALL CENTER | Facility: HOSPITAL | Age: 60
End: 2020-08-25

## 2020-08-26 ENCOUNTER — DOCUMENTATION (OUTPATIENT)
Dept: SOCIAL WORK | Facility: HOSPITAL | Age: 60
End: 2020-08-26

## 2020-08-26 NOTE — PROGRESS NOTES
SW attempted to call pt 3 times and phone line continues to be busy.  Call center reported he did not get medication filled.

## 2021-01-01 ENCOUNTER — APPOINTMENT (OUTPATIENT)
Dept: CT IMAGING | Facility: HOSPITAL | Age: 61
End: 2021-01-01

## 2021-01-01 ENCOUNTER — APPOINTMENT (OUTPATIENT)
Dept: GENERAL RADIOLOGY | Facility: HOSPITAL | Age: 61
End: 2021-01-01

## 2021-01-01 ENCOUNTER — HOSPITAL ENCOUNTER (EMERGENCY)
Facility: HOSPITAL | Age: 61
Discharge: HOME | End: 2021-03-01
Admitting: FAMILY MEDICINE

## 2021-01-01 ENCOUNTER — HOSPITAL ENCOUNTER (INPATIENT)
Facility: HOSPITAL | Age: 61
LOS: 1 days | End: 2021-11-30
Attending: EMERGENCY MEDICINE | Admitting: INTERNAL MEDICINE

## 2021-01-01 ENCOUNTER — EPISODE CHANGES (OUTPATIENT)
Dept: CASE MANAGEMENT | Facility: OTHER | Age: 61
End: 2021-01-01

## 2021-01-01 VITALS
DIASTOLIC BLOOD PRESSURE: 36 MMHG | RESPIRATION RATE: 32 BRPM | HEIGHT: 65 IN | WEIGHT: 121.2 LBS | BODY MASS INDEX: 20.19 KG/M2 | SYSTOLIC BLOOD PRESSURE: 45 MMHG | OXYGEN SATURATION: 50 % | TEMPERATURE: 97.9 F

## 2021-01-01 VITALS
RESPIRATION RATE: 16 BRPM | TEMPERATURE: 97.5 F | DIASTOLIC BLOOD PRESSURE: 105 MMHG | OXYGEN SATURATION: 97 % | BODY MASS INDEX: 22.18 KG/M2 | WEIGHT: 138 LBS | SYSTOLIC BLOOD PRESSURE: 162 MMHG | HEIGHT: 66 IN | HEART RATE: 81 BPM

## 2021-01-01 DIAGNOSIS — R57.8 HEMORRHAGIC SHOCK (HCC): ICD-10-CM

## 2021-01-01 DIAGNOSIS — D73.5 SPLENIC INFARCT: ICD-10-CM
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
## 2021-01-01 DIAGNOSIS — K76.0 HEPATIC STEATOSIS: ICD-10-CM

## 2021-01-01 DIAGNOSIS — R07.81 RIB PAIN ON LEFT SIDE: Primary | ICD-10-CM

## 2021-01-01 DIAGNOSIS — S22.49XA CLOSED FRACTURE OF MULTIPLE RIBS, UNSPECIFIED LATERALITY, INITIAL ENCOUNTER: ICD-10-CM

## 2021-01-01 DIAGNOSIS — J18.9 PNEUMONIA DUE TO INFECTIOUS ORGANISM, UNSPECIFIED LATERALITY, UNSPECIFIED PART OF LUNG: ICD-10-CM

## 2021-01-01 DIAGNOSIS — F10.10 ETOH ABUSE: ICD-10-CM

## 2021-01-01 DIAGNOSIS — K55.9 ISCHEMIC COLITIS (HCC): ICD-10-CM

## 2021-01-01 DIAGNOSIS — J96.00 ACUTE RESPIRATORY FAILURE, UNSPECIFIED WHETHER WITH HYPOXIA OR HYPERCAPNIA (HCC): ICD-10-CM

## 2021-01-01 DIAGNOSIS — K92.2 GASTROINTESTINAL HEMORRHAGE, UNSPECIFIED GASTROINTESTINAL HEMORRHAGE TYPE: Primary | ICD-10-CM

## 2021-01-01 DIAGNOSIS — E87.20 ACIDOSIS: ICD-10-CM

## 2021-01-01 DIAGNOSIS — S20.212A CONTUSION OF RIB ON LEFT SIDE, INITIAL ENCOUNTER: ICD-10-CM

## 2021-01-01 DIAGNOSIS — D64.9 ANEMIA, UNSPECIFIED TYPE: ICD-10-CM

## 2021-01-01 DIAGNOSIS — I46.9 CARDIAC ARREST (HCC): ICD-10-CM

## 2021-01-01 DIAGNOSIS — R79.1 SUPRATHERAPEUTIC INR: ICD-10-CM

## 2021-01-01 LAB
ABO GROUP BLD: NORMAL
ALBUMIN SERPL-MCNC: 0.6 G/DL (ref 3.5–5.2)
ALBUMIN SERPL-MCNC: 3.7 G/DL (ref 3.5–5.2)
ALBUMIN SERPL-MCNC: 3.9 G/DL (ref 3.5–5.2)
ALBUMIN/GLOB SERPL: 1.3 G/DL
ALBUMIN/GLOB SERPL: 2.1 G/DL
ALBUMIN/GLOB SERPL: 3 G/DL
ALP SERPL-CCNC: 131 U/L (ref 39–117)
ALP SERPL-CCNC: 18 U/L (ref 39–117)
ALP SERPL-CCNC: 301 U/L (ref 39–117)
ALT SERPL W P-5'-P-CCNC: 45 U/L (ref 1–41)
ALT SERPL W P-5'-P-CCNC: 48 U/L (ref 1–41)
ALT SERPL W P-5'-P-CCNC: 881 U/L (ref 1–41)
AMPHET+METHAMPHET UR QL: POSITIVE
AMPHETAMINES UR QL: POSITIVE
ANION GAP SERPL CALCULATED.3IONS-SCNC: 13 MMOL/L (ref 5–15)
ANION GAP SERPL CALCULATED.3IONS-SCNC: 19 MMOL/L (ref 5–15)
ANION GAP SERPL CALCULATED.3IONS-SCNC: 31 MMOL/L (ref 5–15)
APTT PPP: 65.5 SECONDS (ref 24.1–35)
APTT PPP: >200 SECONDS (ref 24.1–35)
ARTERIAL PATENCY WRIST A: ABNORMAL
ARTERIAL PATENCY WRIST A: POSITIVE
ARTERIAL PATENCY WRIST A: POSITIVE
AST SERPL-CCNC: 2125 U/L (ref 1–40)
AST SERPL-CCNC: 77 U/L (ref 1–40)
AST SERPL-CCNC: 88 U/L (ref 1–40)
ATMOSPHERIC PRESS: 751 MMHG
ATMOSPHERIC PRESS: 751 MMHG
ATMOSPHERIC PRESS: 752 MMHG
ATMOSPHERIC PRESS: 752 MMHG
BACTERIA UR QL AUTO: ABNORMAL /HPF
BARBITURATES UR QL SCN: NEGATIVE
BASE EXCESS BLDA CALC-SCNC: -10.9 MMOL/L (ref 0–2)
BASE EXCESS BLDA CALC-SCNC: -30.7 MMOL/L (ref 0–2)
BASE EXCESS BLDA CALC-SCNC: -7.7 MMOL/L (ref 0–2)
BASE EXCESS BLDV CALC-SCNC: -30.6 MMOL/L (ref 0–2)
BASOPHILS # BLD AUTO: 0.06 10*3/MM3 (ref 0–0.2)
BASOPHILS NFR BLD AUTO: 0.7 % (ref 0–1.5)
BDY SITE: ABNORMAL
BENZODIAZ UR QL SCN: NEGATIVE
BILIRUB SERPL-MCNC: 0.7 MG/DL (ref 0–1.2)
BILIRUB SERPL-MCNC: 2.1 MG/DL (ref 0–1.2)
BILIRUB SERPL-MCNC: <0.2 MG/DL (ref 0–1.2)
BILIRUB UR QL STRIP: NEGATIVE
BLD GP AB SCN SERPL QL: NEGATIVE
BODY TEMPERATURE: 37 C
BUN SERPL-MCNC: 11 MG/DL (ref 6–20)
BUN SERPL-MCNC: 15 MG/DL (ref 6–20)
BUN SERPL-MCNC: 55 MG/DL (ref 6–20)
BUN/CREAT SERPL: 19.6 (ref 7–25)
BUN/CREAT SERPL: 33.3 (ref 7–25)
BUN/CREAT SERPL: 88.2 (ref 7–25)
BUPRENORPHINE SERPL-MCNC: NEGATIVE NG/ML
BURR CELLS BLD QL SMEAR: ABNORMAL
BURR CELLS BLD QL SMEAR: ABNORMAL
CALCIUM SPEC-SCNC: 6.4 MG/DL (ref 8.6–10.5)
CALCIUM SPEC-SCNC: 7.4 MG/DL (ref 8.6–10.5)
CALCIUM SPEC-SCNC: 9 MG/DL (ref 8.6–10.5)
CANNABINOIDS SERPL QL: POSITIVE
CHLORIDE SERPL-SCNC: 101 MMOL/L (ref 98–107)
CHLORIDE SERPL-SCNC: 102 MMOL/L (ref 98–107)
CHLORIDE SERPL-SCNC: 96 MMOL/L (ref 98–107)
CLARITY UR: ABNORMAL
CO2 SERPL-SCNC: 23 MMOL/L (ref 22–29)
CO2 SERPL-SCNC: 26 MMOL/L (ref 22–29)
CO2 SERPL-SCNC: 4 MMOL/L (ref 22–29)
COCAINE UR QL: NEGATIVE
COHGB MFR BLD: 1.2 % (ref 0–5)
COLOR UR: YELLOW
CREAT SERPL-MCNC: 0.17 MG/DL (ref 0.76–1.27)
CREAT SERPL-MCNC: 0.56 MG/DL (ref 0.76–1.27)
CREAT SERPL-MCNC: 1.65 MG/DL (ref 0.76–1.27)
D-LACTATE SERPL-SCNC: 15.2 MMOL/L (ref 0.5–2)
D-LACTATE SERPL-SCNC: 28.5 MMOL/L (ref 0.5–2)
DEPRECATED RDW RBC AUTO: 42.8 FL (ref 37–54)
DEPRECATED RDW RBC AUTO: 47.1 FL (ref 37–54)
DEPRECATED RDW RBC AUTO: 47.6 FL (ref 37–54)
EOSINOPHIL # BLD AUTO: 0.19 10*3/MM3 (ref 0–0.4)
EOSINOPHIL NFR BLD AUTO: 2.3 % (ref 0.3–6.2)
ERYTHROCYTE [DISTWIDTH] IN BLOOD BY AUTOMATED COUNT: 12.5 % (ref 12.3–15.4)
ERYTHROCYTE [DISTWIDTH] IN BLOOD BY AUTOMATED COUNT: 13.8 % (ref 12.3–15.4)
ERYTHROCYTE [DISTWIDTH] IN BLOOD BY AUTOMATED COUNT: 14.8 % (ref 12.3–15.4)
ETHANOL UR QL: <0.01 %
ETHANOL UR QL: <0.01 %
FERRITIN SERPL-MCNC: 6613 NG/ML (ref 30–400)
FIBRINOGEN PPP-MCNC: <60 MG/DL (ref 240–460)
FOLATE SERPL-MCNC: 3.35 NG/ML (ref 4.78–24.2)
GFR SERPL CREATININE-BSD FRML MDRD: 149 ML/MIN/1.73
GFR SERPL CREATININE-BSD FRML MDRD: 43 ML/MIN/1.73
GFR SERPL CREATININE-BSD FRML MDRD: >150 ML/MIN/1.73
GIANT PLATELETS: ABNORMAL
GLOBULIN UR ELPH-MCNC: 0.2 GM/DL
GLOBULIN UR ELPH-MCNC: 1.9 GM/DL
GLOBULIN UR ELPH-MCNC: 2.9 GM/DL
GLUCOSE BLDC GLUCOMTR-MCNC: 105 MG/DL (ref 70–130)
GLUCOSE BLDC GLUCOMTR-MCNC: 131 MG/DL (ref 70–130)
GLUCOSE BLDC GLUCOMTR-MCNC: 187 MG/DL (ref 70–130)
GLUCOSE BLDC GLUCOMTR-MCNC: 334 MG/DL (ref 70–130)
GLUCOSE SERPL-MCNC: 107 MG/DL (ref 65–99)
GLUCOSE SERPL-MCNC: 154 MG/DL (ref 65–99)
GLUCOSE SERPL-MCNC: 37 MG/DL (ref 65–99)
GLUCOSE UR STRIP-MCNC: ABNORMAL MG/DL
HBA1C MFR BLD: <4.3 % (ref 4.8–5.6)
HCO3 BLDA-SCNC: 23.4 MMOL/L (ref 20–26)
HCO3 BLDA-SCNC: 25.2 MMOL/L (ref 20–26)
HCO3 BLDA-SCNC: 8.6 MMOL/L (ref 20–26)
HCO3 BLDV-SCNC: 3.9 MMOL/L (ref 22–28)
HCT VFR BLD AUTO: 10.8 % (ref 37.5–51)
HCT VFR BLD AUTO: 40.5 % (ref 37.5–51)
HCT VFR BLD AUTO: 42.9 % (ref 37.5–51)
HCT VFR BLD AUTO: 46.3 % (ref 37.5–51)
HGB BLD-MCNC: 13.1 G/DL (ref 13–17.7)
HGB BLD-MCNC: 14.7 G/DL (ref 13–17.7)
HGB BLD-MCNC: 15.6 G/DL (ref 13–17.7)
HGB BLD-MCNC: 3.1 G/DL (ref 13–17.7)
HGB BLDA-MCNC: <4.8 G/DL (ref 14–18)
HGB UR QL STRIP.AUTO: ABNORMAL
HOLD SPECIMEN: NORMAL
HYALINE CASTS UR QL AUTO: ABNORMAL /LPF
IMM GRANULOCYTES # BLD AUTO: 0.03 10*3/MM3 (ref 0–0.05)
IMM GRANULOCYTES NFR BLD AUTO: 0.4 % (ref 0–0.5)
INHALED O2 CONCENTRATION: 100 %
INR PPP: 2.08 (ref 0.91–1.09)
INR PPP: >15 (ref 0.91–1.09)
IRON 24H UR-MRATE: 220 MCG/DL (ref 59–158)
IRON SATN MFR SERPL: 84 % (ref 20–50)
KETONES UR QL STRIP: ABNORMAL
L PNEUMO1 AG UR QL IA: NEGATIVE
LEUKOCYTE ESTERASE UR QL STRIP.AUTO: NEGATIVE
LIPASE SERPL-CCNC: 24 U/L (ref 13–60)
LIPASE SERPL-CCNC: 25 U/L (ref 13–60)
LYMPHOCYTES # BLD AUTO: 3.05 10*3/MM3 (ref 0.7–3.1)
LYMPHOCYTES # BLD MANUAL: 0.83 10*3/MM3 (ref 0.7–3.1)
LYMPHOCYTES # BLD MANUAL: 2.02 10*3/MM3 (ref 0.7–3.1)
LYMPHOCYTES NFR BLD AUTO: 36.1 % (ref 19.6–45.3)
LYMPHOCYTES NFR BLD MANUAL: 3.1 % (ref 5–12)
LYMPHOCYTES NFR BLD MANUAL: 4 % (ref 5–12)
Lab: ABNORMAL
MAGNESIUM SERPL-MCNC: 0.8 MG/DL (ref 1.6–2.6)
MCH RBC QN AUTO: 29.5 PG (ref 26.6–33)
MCH RBC QN AUTO: 30.1 PG (ref 26.6–33)
MCH RBC QN AUTO: 30.2 PG (ref 26.6–33)
MCHC RBC AUTO-ENTMCNC: 28.7 G/DL (ref 31.5–35.7)
MCHC RBC AUTO-ENTMCNC: 31.7 G/DL (ref 31.5–35.7)
MCHC RBC AUTO-ENTMCNC: 36.4 G/DL (ref 31.5–35.7)
MCV RBC AUTO: 104.9 FL (ref 79–97)
MCV RBC AUTO: 83.1 FL (ref 79–97)
MCV RBC AUTO: 93 FL (ref 79–97)
METAMYELOCYTES NFR BLD MANUAL: 1 % (ref 0–0)
METAMYELOCYTES NFR BLD MANUAL: 2.1 % (ref 0–0)
METHADONE UR QL SCN: NEGATIVE
METHGB BLD QL: 0.5 % (ref 0–3)
MODALITY: ABNORMAL
MONOCYTES # BLD AUTO: 0.62 10*3/MM3 (ref 0.1–0.9)
MONOCYTES # BLD: 0.18 10*3/MM3 (ref 0.1–0.9)
MONOCYTES # BLD: 0.26 10*3/MM3 (ref 0.1–0.9)
MONOCYTES NFR BLD AUTO: 7.3 % (ref 5–12)
MYELOCYTES NFR BLD MANUAL: 1 % (ref 0–0)
MYELOCYTES NFR BLD MANUAL: 3.1 % (ref 0–0)
NEUTROPHILS # BLD AUTO: 4.1 10*3/MM3 (ref 1.7–7)
NEUTROPHILS # BLD AUTO: 4.41 10*3/MM3 (ref 1.7–7)
NEUTROPHILS NFR BLD AUTO: 4.49 10*3/MM3 (ref 1.7–7)
NEUTROPHILS NFR BLD AUTO: 53.2 % (ref 42.7–76)
NEUTROPHILS NFR BLD MANUAL: 57.7 % (ref 42.7–76)
NEUTROPHILS NFR BLD MANUAL: 61 % (ref 42.7–76)
NEUTS BAND NFR BLD MANUAL: 19.6 % (ref 0–5)
NEUTS BAND NFR BLD MANUAL: 2 % (ref 0–5)
NITRITE UR QL STRIP: NEGATIVE
NOTE: ABNORMAL
NOTIFIED BY: ABNORMAL
NOTIFIED WHO: ABNORMAL
NRBC BLD AUTO-RTO: 0 /100 WBC (ref 0–0.2)
NT-PROBNP SERPL-MCNC: 2512 PG/ML (ref 0–900)
NT-PROBNP SERPL-MCNC: 882.5 PG/ML (ref 0–900)
OPIATES UR QL: POSITIVE
OXYCODONE UR QL SCN: NEGATIVE
OXYHGB MFR BLDV: 43.1 % (ref 60–85)
PCO2 BLDA: 97.5 MM HG (ref 35–45)
PCO2 BLDA: >102 MM HG (ref 35–45)
PCO2 BLDA: >102 MM HG (ref 35–45)
PCO2 BLDV: 50.7 MM HG (ref 41–51)
PCO2 TEMP ADJ BLD: 102 MM HG (ref 35–45)
PCO2 TEMP ADJ BLD: 97.5 MM HG (ref 35–45)
PCO2 TEMP ADJ BLD: >102 MM HG (ref 35–45)
PCP UR QL SCN: NEGATIVE
PEEP RESPIRATORY: 10 CM[H2O]
PEEP RESPIRATORY: 12 CM[H2O]
PEEP RESPIRATORY: 8 CM[H2O]
PH BLDA: 6.97 PH UNITS (ref 7.35–7.45)
PH BLDA: 7.02 PH UNITS (ref 7.35–7.45)
PH BLDA: <6.773 PH UNITS (ref 7.35–7.45)
PH BLDV: <6.773 PH UNITS (ref 7.32–7.42)
PH UR STRIP.AUTO: 6 [PH] (ref 5–8)
PH, TEMP CORRECTED: 6.97 PH UNITS (ref 7.35–7.45)
PH, TEMP CORRECTED: 7.02 PH UNITS (ref 7.35–7.45)
PH, TEMP CORRECTED: <6.765 PH UNITS (ref 7.35–7.45)
PLATELET # BLD AUTO: 155 10*3/MM3 (ref 140–450)
PLATELET # BLD AUTO: 174 10*3/MM3 (ref 140–450)
PLATELET # BLD AUTO: 72 10*3/MM3 (ref 140–450)
PMV BLD AUTO: 10.2 FL (ref 6–12)
PMV BLD AUTO: 11.4 FL (ref 6–12)
PMV BLD AUTO: 11.6 FL (ref 6–12)
PO2 BLDA: 111 MM HG (ref 83–108)
PO2 BLDA: 58.1 MM HG (ref 83–108)
PO2 BLDA: 77.7 MM HG (ref 83–108)
PO2 BLDV: 54.2 MM HG (ref 27–53)
PO2 TEMP ADJ BLD: 111 MM HG (ref 83–108)
PO2 TEMP ADJ BLD: 58.1 MM HG (ref 83–108)
PO2 TEMP ADJ BLD: 77.7 MM HG (ref 83–108)
POIKILOCYTOSIS BLD QL SMEAR: ABNORMAL
POTASSIUM BLDV-SCNC: 4.4 MMOL/L (ref 3.5–5.2)
POTASSIUM SERPL-SCNC: 3.3 MMOL/L (ref 3.5–5.2)
POTASSIUM SERPL-SCNC: 4.6 MMOL/L (ref 3.5–5.2)
POTASSIUM SERPL-SCNC: 5 MMOL/L (ref 3.5–5.2)
PROCALCITONIN SERPL-MCNC: 0.06 NG/ML (ref 0–0.25)
PROPOXYPH UR QL: NEGATIVE
PROT SERPL-MCNC: 0.8 G/DL (ref 6–8.5)
PROT SERPL-MCNC: 5.8 G/DL (ref 6–8.5)
PROT SERPL-MCNC: 6.6 G/DL (ref 6–8.5)
PROT UR QL STRIP: ABNORMAL
PROTHROMBIN TIME: 111.7 SECONDS (ref 11.9–14.6)
PROTHROMBIN TIME: 22.6 SECONDS (ref 11.9–14.6)
QT INTERVAL: 444 MS
QTC INTERVAL: 512 MS
RBC # BLD AUTO: 1.03 10*6/MM3 (ref 4.14–5.8)
RBC # BLD AUTO: 4.98 10*6/MM3 (ref 4.14–5.8)
RBC # BLD AUTO: 5.16 10*6/MM3 (ref 4.14–5.8)
RBC # UR STRIP: ABNORMAL /HPF
REF LAB TEST METHOD: ABNORMAL
RETICS # AUTO: 0.01 10*6/MM3 (ref 0.02–0.13)
RETICS/RBC NFR AUTO: 1.27 % (ref 0.7–1.9)
RH BLD: POSITIVE
S PNEUM AG SPEC QL LA: NEGATIVE
SAO2 % BLDCOA: 67.2 % (ref 94–99)
SAO2 % BLDCOA: 75.9 % (ref 94–99)
SAO2 % BLDCOA: 96.4 % (ref 94–99)
SAO2 % BLDCOV: 43.8 % (ref 45–75)
SARS-COV-2 RNA PNL SPEC NAA+PROBE: NOT DETECTED
SET MECH RESP RATE: 22
SET MECH RESP RATE: 24
SET MECH RESP RATE: 32
SMALL PLATELETS BLD QL SMEAR: ABNORMAL
SODIUM BLDV-SCNC: 132 MMOL/L (ref 136–145)
SODIUM SERPL-SCNC: 136 MMOL/L (ref 136–145)
SODIUM SERPL-SCNC: 138 MMOL/L (ref 136–145)
SODIUM SERPL-SCNC: 141 MMOL/L (ref 136–145)
SP GR UR STRIP: 1.02 (ref 1–1.03)
SPERM URNS QL MICRO: ABNORMAL /HPF
SQUAMOUS #/AREA URNS HPF: ABNORMAL /HPF
T&S EXPIRATION DATE: NORMAL
TIBC SERPL-MCNC: 261 MCG/DL (ref 298–536)
TRANSFERRIN SERPL-MCNC: 175 MG/DL (ref 200–360)
TRICYCLICS UR QL SCN: NEGATIVE
TROPONIN T SERPL-MCNC: <0.01 NG/ML (ref 0–0.03)
UROBILINOGEN UR QL STRIP: ABNORMAL
VARIANT LYMPHS NFR BLD MANUAL: 12.4 % (ref 19.6–45.3)
VARIANT LYMPHS NFR BLD MANUAL: 2.1 % (ref 0–5)
VARIANT LYMPHS NFR BLD MANUAL: 31 % (ref 19.6–45.3)
VENTILATOR MODE: ABNORMAL
VENTILATOR MODE: AC
VIT B12 BLD-MCNC: 150 PG/ML (ref 211–946)
VT ON VENT VENT: 450 ML
VT ON VENT VENT: 500 ML
VT ON VENT VENT: 500 ML
WBC # BLD AUTO: 8.44 10*3/MM3 (ref 3.4–10.8)
WBC # UR STRIP: ABNORMAL /HPF
WBC MORPH BLD: NORMAL
WBC MORPH BLD: NORMAL
WBC NRBC COR # BLD: 5.71 10*3/MM3 (ref 3.4–10.8)
WBC NRBC COR # BLD: 6.51 10*3/MM3 (ref 3.4–10.8)

## 2021-01-01 PROCEDURE — 96375 TX/PRO/DX INJ NEW DRUG ADDON: CPT

## 2021-01-01 PROCEDURE — 25010000002 THIAMINE PER 100 MG: Performed by: INTERNAL MEDICINE

## 2021-01-01 PROCEDURE — 83880 ASSAY OF NATRIURETIC PEPTIDE: CPT | Performed by: EMERGENCY MEDICINE

## 2021-01-01 PROCEDURE — 84466 ASSAY OF TRANSFERRIN: CPT | Performed by: INTERNAL MEDICINE

## 2021-01-01 PROCEDURE — 94799 UNLISTED PULMONARY SVC/PX: CPT

## 2021-01-01 PROCEDURE — 25010000002 EPINEPHRINE 1 MG/ML SOLUTION 1 ML VIAL: Performed by: EMERGENCY MEDICINE

## 2021-01-01 PROCEDURE — 96366 THER/PROPH/DIAG IV INF ADDON: CPT

## 2021-01-01 PROCEDURE — 5A1935Z RESPIRATORY VENTILATION, LESS THAN 24 CONSECUTIVE HOURS: ICD-10-PCS | Performed by: EMERGENCY MEDICINE

## 2021-01-01 PROCEDURE — 82077 ASSAY SPEC XCP UR&BREATH IA: CPT | Performed by: EMERGENCY MEDICINE

## 2021-01-01 PROCEDURE — 85025 COMPLETE CBC W/AUTO DIFF WBC: CPT | Performed by: EMERGENCY MEDICINE

## 2021-01-01 PROCEDURE — 87086 URINE CULTURE/COLONY COUNT: CPT | Performed by: EMERGENCY MEDICINE

## 2021-01-01 PROCEDURE — 82962 GLUCOSE BLOOD TEST: CPT

## 2021-01-01 PROCEDURE — 83735 ASSAY OF MAGNESIUM: CPT | Performed by: EMERGENCY MEDICINE

## 2021-01-01 PROCEDURE — 83605 ASSAY OF LACTIC ACID: CPT | Performed by: EMERGENCY MEDICINE

## 2021-01-01 PROCEDURE — 85014 HEMATOCRIT: CPT | Performed by: INTERNAL MEDICINE

## 2021-01-01 PROCEDURE — 85730 THROMBOPLASTIN TIME PARTIAL: CPT | Performed by: EMERGENCY MEDICINE

## 2021-01-01 PROCEDURE — 70450 CT HEAD/BRAIN W/O DYE: CPT

## 2021-01-01 PROCEDURE — 71101 X-RAY EXAM UNILAT RIBS/CHEST: CPT

## 2021-01-01 PROCEDURE — 0 IOPAMIDOL PER 1 ML: Performed by: EMERGENCY MEDICINE

## 2021-01-01 PROCEDURE — 87040 BLOOD CULTURE FOR BACTERIA: CPT | Performed by: EMERGENCY MEDICINE

## 2021-01-01 PROCEDURE — 85007 BL SMEAR W/DIFF WBC COUNT: CPT | Performed by: INTERNAL MEDICINE

## 2021-01-01 PROCEDURE — 85610 PROTHROMBIN TIME: CPT | Performed by: EMERGENCY MEDICINE

## 2021-01-01 PROCEDURE — 82820 HEMOGLOBIN-OXYGEN AFFINITY: CPT

## 2021-01-01 PROCEDURE — 71045 X-RAY EXAM CHEST 1 VIEW: CPT

## 2021-01-01 PROCEDURE — 83540 ASSAY OF IRON: CPT | Performed by: INTERNAL MEDICINE

## 2021-01-01 PROCEDURE — 74177 CT ABD & PELVIS W/CONTRAST: CPT

## 2021-01-01 PROCEDURE — 25010000002 ALBUMIN HUMAN 25% PER 50 ML: Performed by: EMERGENCY MEDICINE

## 2021-01-01 PROCEDURE — 94003 VENT MGMT INPAT SUBQ DAY: CPT

## 2021-01-01 PROCEDURE — 87899 AGENT NOS ASSAY W/OPTIC: CPT | Performed by: INTERNAL MEDICINE

## 2021-01-01 PROCEDURE — 25010000002 MORPHINE (PF) 10 MG/ML SOLUTION: Performed by: INTERNAL MEDICINE

## 2021-01-01 PROCEDURE — 82805 BLOOD GASES W/O2 SATURATION: CPT

## 2021-01-01 PROCEDURE — 85730 THROMBOPLASTIN TIME PARTIAL: CPT | Performed by: INTERNAL MEDICINE

## 2021-01-01 PROCEDURE — 99221 1ST HOSP IP/OBS SF/LOW 40: CPT | Performed by: SURGERY

## 2021-01-01 PROCEDURE — 0 PHYTONADIONE 10 MG/ML SOLUTION 1 ML AMPULE: Performed by: EMERGENCY MEDICINE

## 2021-01-01 PROCEDURE — 80053 COMPREHEN METABOLIC PANEL: CPT | Performed by: INTERNAL MEDICINE

## 2021-01-01 PROCEDURE — 86920 COMPATIBILITY TEST SPIN: CPT

## 2021-01-01 PROCEDURE — 25010000002 EPINEPHRINE 1 MG/ML SOLUTION 1 ML VIAL: Performed by: INTERNAL MEDICINE

## 2021-01-01 PROCEDURE — 25010000002 MAGNESIUM SULFATE 2 GM/50ML SOLUTION: Performed by: EMERGENCY MEDICINE

## 2021-01-01 PROCEDURE — 80306 DRUG TEST PRSMV INSTRMNT: CPT | Performed by: EMERGENCY MEDICINE

## 2021-01-01 PROCEDURE — 0BH18EZ INSERTION OF ENDOTRACHEAL AIRWAY INTO TRACHEA, VIA NATURAL OR ARTIFICIAL OPENING ENDOSCOPIC: ICD-10-PCS | Performed by: EMERGENCY MEDICINE

## 2021-01-01 PROCEDURE — 96365 THER/PROPH/DIAG IV INF INIT: CPT

## 2021-01-01 PROCEDURE — 25010000002 FUROSEMIDE PER 20 MG: Performed by: INTERNAL MEDICINE

## 2021-01-01 PROCEDURE — 25010000002 PIPERACILLIN SOD-TAZOBACTAM PER 1 G: Performed by: EMERGENCY MEDICINE

## 2021-01-01 PROCEDURE — 86927 PLASMA FRESH FROZEN: CPT

## 2021-01-01 PROCEDURE — 25010000002 MORPHINE PER 10 MG: Performed by: PHYSICIAN ASSISTANT

## 2021-01-01 PROCEDURE — 82728 ASSAY OF FERRITIN: CPT | Performed by: INTERNAL MEDICINE

## 2021-01-01 PROCEDURE — 36600 WITHDRAWAL OF ARTERIAL BLOOD: CPT

## 2021-01-01 PROCEDURE — 82607 VITAMIN B-12: CPT | Performed by: INTERNAL MEDICINE

## 2021-01-01 PROCEDURE — 5A19054 RESPIRATORY VENTILATION, SINGLE, NONMECHANICAL: ICD-10-PCS | Performed by: EMERGENCY MEDICINE

## 2021-01-01 PROCEDURE — 83690 ASSAY OF LIPASE: CPT | Performed by: PHYSICIAN ASSISTANT

## 2021-01-01 PROCEDURE — 25010000002 ONDANSETRON PER 1 MG: Performed by: PHYSICIAN ASSISTANT

## 2021-01-01 PROCEDURE — 85018 HEMOGLOBIN: CPT | Performed by: INTERNAL MEDICINE

## 2021-01-01 PROCEDURE — P9017 PLASMA 1 DONOR FRZ W/IN 8 HR: HCPCS

## 2021-01-01 PROCEDURE — 96374 THER/PROPH/DIAG INJ IV PUSH: CPT

## 2021-01-01 PROCEDURE — 93005 ELECTROCARDIOGRAM TRACING: CPT | Performed by: PHYSICIAN ASSISTANT

## 2021-01-01 PROCEDURE — 36430 TRANSFUSION BLD/BLD COMPNT: CPT

## 2021-01-01 PROCEDURE — 25010000002 IOPAMIDOL 61 % SOLUTION: Performed by: PHYSICIAN ASSISTANT

## 2021-01-01 PROCEDURE — 83036 HEMOGLOBIN GLYCOSYLATED A1C: CPT | Performed by: INTERNAL MEDICINE

## 2021-01-01 PROCEDURE — 85610 PROTHROMBIN TIME: CPT | Performed by: INTERNAL MEDICINE

## 2021-01-01 PROCEDURE — 25010000002 FUROSEMIDE PER 20 MG: Performed by: EMERGENCY MEDICINE

## 2021-01-01 PROCEDURE — 84484 ASSAY OF TROPONIN QUANT: CPT | Performed by: EMERGENCY MEDICINE

## 2021-01-01 PROCEDURE — 85045 AUTOMATED RETICULOCYTE COUNT: CPT | Performed by: INTERNAL MEDICINE

## 2021-01-01 PROCEDURE — 31500 INSERT EMERGENCY AIRWAY: CPT

## 2021-01-01 PROCEDURE — 25010000002 OCTREOTIDE PER 25 MCG: Performed by: EMERGENCY MEDICINE

## 2021-01-01 PROCEDURE — C1751 CATH, INF, PER/CENT/MIDLINE: HCPCS

## 2021-01-01 PROCEDURE — 86850 RBC ANTIBODY SCREEN: CPT | Performed by: EMERGENCY MEDICINE

## 2021-01-01 PROCEDURE — 99284 EMERGENCY DEPT VISIT MOD MDM: CPT

## 2021-01-01 PROCEDURE — 06HY33Z INSERTION OF INFUSION DEVICE INTO LOWER VEIN, PERCUTANEOUS APPROACH: ICD-10-PCS | Performed by: EMERGENCY MEDICINE

## 2021-01-01 PROCEDURE — 94002 VENT MGMT INPAT INIT DAY: CPT

## 2021-01-01 PROCEDURE — 25010000002 PIPERACILLIN SOD-TAZOBACTAM PER 1 G: Performed by: INTERNAL MEDICINE

## 2021-01-01 PROCEDURE — 93010 ELECTROCARDIOGRAM REPORT: CPT | Performed by: INTERNAL MEDICINE

## 2021-01-01 PROCEDURE — 85384 FIBRINOGEN ACTIVITY: CPT | Performed by: EMERGENCY MEDICINE

## 2021-01-01 PROCEDURE — 36415 COLL VENOUS BLD VENIPUNCTURE: CPT

## 2021-01-01 PROCEDURE — P9047 ALBUMIN (HUMAN), 25%, 50ML: HCPCS | Performed by: EMERGENCY MEDICINE

## 2021-01-01 PROCEDURE — 86901 BLOOD TYPING SEROLOGIC RH(D): CPT | Performed by: EMERGENCY MEDICINE

## 2021-01-01 PROCEDURE — 99223 1ST HOSP IP/OBS HIGH 75: CPT | Performed by: INTERNAL MEDICINE

## 2021-01-01 PROCEDURE — 71275 CT ANGIOGRAPHY CHEST: CPT

## 2021-01-01 PROCEDURE — 80053 COMPREHEN METABOLIC PANEL: CPT | Performed by: EMERGENCY MEDICINE

## 2021-01-01 PROCEDURE — 96367 TX/PROPH/DG ADDL SEQ IV INF: CPT

## 2021-01-01 PROCEDURE — 94640 AIRWAY INHALATION TREATMENT: CPT

## 2021-01-01 PROCEDURE — 96361 HYDRATE IV INFUSION ADD-ON: CPT

## 2021-01-01 PROCEDURE — 96376 TX/PRO/DX INJ SAME DRUG ADON: CPT

## 2021-01-01 PROCEDURE — P9100 PATHOGEN TEST FOR PLATELETS: HCPCS

## 2021-01-01 PROCEDURE — 82803 BLOOD GASES ANY COMBINATION: CPT

## 2021-01-01 PROCEDURE — P9035 PLATELET PHERES LEUKOREDUCED: HCPCS

## 2021-01-01 PROCEDURE — 81001 URINALYSIS AUTO W/SCOPE: CPT | Performed by: EMERGENCY MEDICINE

## 2021-01-01 PROCEDURE — 84145 PROCALCITONIN (PCT): CPT | Performed by: EMERGENCY MEDICINE

## 2021-01-01 PROCEDURE — 85025 COMPLETE CBC W/AUTO DIFF WBC: CPT | Performed by: INTERNAL MEDICINE

## 2021-01-01 PROCEDURE — 85025 COMPLETE CBC W/AUTO DIFF WBC: CPT | Performed by: PHYSICIAN ASSISTANT

## 2021-01-01 PROCEDURE — 80053 COMPREHEN METABOLIC PANEL: CPT | Performed by: PHYSICIAN ASSISTANT

## 2021-01-01 PROCEDURE — 87635 SARS-COV-2 COVID-19 AMP PRB: CPT | Performed by: EMERGENCY MEDICINE

## 2021-01-01 PROCEDURE — 99222 1ST HOSP IP/OBS MODERATE 55: CPT | Performed by: NURSE PRACTITIONER

## 2021-01-01 PROCEDURE — P9612 CATHETERIZE FOR URINE SPEC: HCPCS

## 2021-01-01 PROCEDURE — 82077 ASSAY SPEC XCP UR&BREATH IA: CPT | Performed by: PHYSICIAN ASSISTANT

## 2021-01-01 PROCEDURE — 83690 ASSAY OF LIPASE: CPT | Performed by: EMERGENCY MEDICINE

## 2021-01-01 PROCEDURE — 25010000002 EPINEPHRINE 1 MG/ML SOLUTION 30 ML VIAL: Performed by: EMERGENCY MEDICINE

## 2021-01-01 PROCEDURE — 93005 ELECTROCARDIOGRAM TRACING: CPT | Performed by: EMERGENCY MEDICINE

## 2021-01-01 PROCEDURE — 86900 BLOOD TYPING SEROLOGIC ABO: CPT | Performed by: EMERGENCY MEDICINE

## 2021-01-01 PROCEDURE — 85007 BL SMEAR W/DIFF WBC COUNT: CPT | Performed by: EMERGENCY MEDICINE

## 2021-01-01 PROCEDURE — 25010000002 VANCOMYCIN 10 G RECONSTITUTED SOLUTION: Performed by: INTERNAL MEDICINE

## 2021-01-01 PROCEDURE — 25010000002 EPINEPHRINE 1 MG/10ML SOLUTION PREFILLED SYRINGE: Performed by: EMERGENCY MEDICINE

## 2021-01-01 PROCEDURE — 99291 CRITICAL CARE FIRST HOUR: CPT

## 2021-01-01 PROCEDURE — 82746 ASSAY OF FOLIC ACID SERUM: CPT | Performed by: INTERNAL MEDICINE

## 2021-01-01 PROCEDURE — 5A12012 PERFORMANCE OF CARDIAC OUTPUT, SINGLE, MANUAL: ICD-10-PCS | Performed by: EMERGENCY MEDICINE

## 2021-01-01 PROCEDURE — 25010000002 OCTREOTIDE PER 25 MCG: Performed by: INTERNAL MEDICINE

## 2021-01-01 PROCEDURE — 74018 RADEX ABDOMEN 1 VIEW: CPT

## 2021-01-01 RX ORDER — SCOLOPAMINE TRANSDERMAL SYSTEM 1 MG/1
1 PATCH, EXTENDED RELEASE TRANSDERMAL
Status: DISCONTINUED | OUTPATIENT
Start: 2021-01-01 | End: 2021-01-01 | Stop reason: HOSPADM

## 2021-01-01 RX ORDER — ETOMIDATE 2 MG/ML
INJECTION INTRAVENOUS
Status: COMPLETED | OUTPATIENT
Start: 2021-01-01 | End: 2021-01-01

## 2021-01-01 RX ORDER — NICOTINE POLACRILEX 4 MG
15 LOZENGE BUCCAL
Status: DISCONTINUED | OUTPATIENT
Start: 2021-01-01 | End: 2021-01-01

## 2021-01-01 RX ORDER — SODIUM CHLORIDE 0.9 % (FLUSH) 0.9 %
10 SYRINGE (ML) INJECTION AS NEEDED
Status: DISCONTINUED | OUTPATIENT
Start: 2021-01-01 | End: 2021-01-01 | Stop reason: HOSPADM

## 2021-01-01 RX ORDER — ACETAMINOPHEN 650 MG/1
650 SUPPOSITORY RECTAL EVERY 4 HOURS PRN
Status: DISCONTINUED | OUTPATIENT
Start: 2021-01-01 | End: 2021-01-01 | Stop reason: HOSPADM

## 2021-01-01 RX ORDER — FENTANYL CITRATE 50 UG/ML
50 INJECTION, SOLUTION INTRAMUSCULAR; INTRAVENOUS ONCE
Status: DISCONTINUED | OUTPATIENT
Start: 2021-01-01 | End: 2021-01-01 | Stop reason: HOSPADM

## 2021-01-01 RX ORDER — ONDANSETRON 2 MG/ML
4 INJECTION INTRAMUSCULAR; INTRAVENOUS ONCE
Status: COMPLETED | OUTPATIENT
Start: 2021-01-01 | End: 2021-01-01

## 2021-01-01 RX ORDER — LORAZEPAM 2 MG/ML
2 INJECTION INTRAMUSCULAR
Status: DISCONTINUED | OUTPATIENT
Start: 2021-01-01 | End: 2021-01-01

## 2021-01-01 RX ORDER — LORAZEPAM 2 MG/ML
1 INJECTION INTRAMUSCULAR EVERY 6 HOURS
Status: DISCONTINUED | OUTPATIENT
Start: 2021-01-01 | End: 2021-01-01

## 2021-01-01 RX ORDER — FUROSEMIDE 10 MG/ML
40 INJECTION INTRAMUSCULAR; INTRAVENOUS ONCE
Status: COMPLETED | OUTPATIENT
Start: 2021-01-01 | End: 2021-01-01

## 2021-01-01 RX ORDER — NOREPINEPHRINE BIT/0.9 % NACL 8 MG/250ML
INFUSION BOTTLE (ML) INTRAVENOUS
Status: COMPLETED
Start: 2021-01-01 | End: 2021-01-01

## 2021-01-01 RX ORDER — ROCURONIUM BROMIDE 10 MG/ML
INJECTION, SOLUTION INTRAVENOUS
Status: COMPLETED | OUTPATIENT
Start: 2021-01-01 | End: 2021-01-01

## 2021-01-01 RX ORDER — LORAZEPAM 1 MG/1
2 TABLET ORAL
Status: DISCONTINUED | OUTPATIENT
Start: 2021-01-01 | End: 2021-01-01

## 2021-01-01 RX ORDER — ACETAMINOPHEN 325 MG/1
650 TABLET ORAL EVERY 4 HOURS PRN
Status: DISCONTINUED | OUTPATIENT
Start: 2021-01-01 | End: 2021-01-01 | Stop reason: HOSPADM

## 2021-01-01 RX ORDER — MORPHINE SULFATE 1 MG/ML
2-30 INJECTION, SOLUTION INTRAVENOUS
Status: DISCONTINUED | OUTPATIENT
Start: 2021-01-01 | End: 2021-01-01 | Stop reason: HOSPADM

## 2021-01-01 RX ORDER — LORAZEPAM 1 MG/1
1 TABLET ORAL
Status: DISCONTINUED | OUTPATIENT
Start: 2021-01-01 | End: 2021-01-01

## 2021-01-01 RX ORDER — LORAZEPAM 1 MG/1
4 TABLET ORAL
Status: DISCONTINUED | OUTPATIENT
Start: 2021-01-01 | End: 2021-01-01

## 2021-01-01 RX ORDER — ALBUTEROL SULFATE 2.5 MG/3ML
2.5 SOLUTION RESPIRATORY (INHALATION) EVERY 4 HOURS PRN
Status: DISCONTINUED | OUTPATIENT
Start: 2021-01-01 | End: 2021-01-01

## 2021-01-01 RX ORDER — MAGNESIUM SULFATE HEPTAHYDRATE 40 MG/ML
2 INJECTION, SOLUTION INTRAVENOUS ONCE
Status: COMPLETED | OUTPATIENT
Start: 2021-01-01 | End: 2021-01-01

## 2021-01-01 RX ORDER — OCTREOTIDE ACETATE 50 UG/ML
50 INJECTION, SOLUTION INTRAVENOUS; SUBCUTANEOUS ONCE
Status: COMPLETED | OUTPATIENT
Start: 2021-01-01 | End: 2021-01-01

## 2021-01-01 RX ORDER — EPINEPHRINE 0.1 MG/ML
SYRINGE (ML) INJECTION
Status: COMPLETED | OUTPATIENT
Start: 2021-01-01 | End: 2021-01-01

## 2021-01-01 RX ORDER — LORAZEPAM 2 MG/ML
1 INJECTION INTRAMUSCULAR
Status: DISCONTINUED | OUTPATIENT
Start: 2021-01-01 | End: 2021-01-01

## 2021-01-01 RX ORDER — IPRATROPIUM BROMIDE AND ALBUTEROL SULFATE 2.5; .5 MG/3ML; MG/3ML
3 SOLUTION RESPIRATORY (INHALATION)
Status: DISCONTINUED | OUTPATIENT
Start: 2021-01-01 | End: 2021-01-01

## 2021-01-01 RX ORDER — DEXTROSE MONOHYDRATE 25 G/50ML
25 INJECTION, SOLUTION INTRAVENOUS
Status: DISCONTINUED | OUTPATIENT
Start: 2021-01-01 | End: 2021-01-01

## 2021-01-01 RX ORDER — CHLORHEXIDINE GLUCONATE 0.12 MG/ML
15 RINSE ORAL EVERY 12 HOURS SCHEDULED
Status: DISCONTINUED | OUTPATIENT
Start: 2021-01-01 | End: 2021-01-01

## 2021-01-01 RX ORDER — SODIUM CHLORIDE 0.9 % (FLUSH) 0.9 %
10 SYRINGE (ML) INJECTION EVERY 12 HOURS SCHEDULED
Status: DISCONTINUED | OUTPATIENT
Start: 2021-01-01 | End: 2021-01-01 | Stop reason: HOSPADM

## 2021-01-01 RX ORDER — ONDANSETRON 4 MG/1
4 TABLET, FILM COATED ORAL EVERY 6 HOURS PRN
Status: DISCONTINUED | OUTPATIENT
Start: 2021-01-01 | End: 2021-01-01 | Stop reason: HOSPADM

## 2021-01-01 RX ORDER — PANTOPRAZOLE SODIUM 40 MG/10ML
80 INJECTION, POWDER, LYOPHILIZED, FOR SOLUTION INTRAVENOUS ONCE
Status: COMPLETED | OUTPATIENT
Start: 2021-01-01 | End: 2021-01-01

## 2021-01-01 RX ORDER — ONDANSETRON 2 MG/ML
4 INJECTION INTRAMUSCULAR; INTRAVENOUS EVERY 6 HOURS PRN
Status: DISCONTINUED | OUTPATIENT
Start: 2021-01-01 | End: 2021-01-01 | Stop reason: HOSPADM

## 2021-01-01 RX ORDER — FUROSEMIDE 10 MG/ML
60 INJECTION INTRAMUSCULAR; INTRAVENOUS ONCE
Status: COMPLETED | OUTPATIENT
Start: 2021-01-01 | End: 2021-01-01

## 2021-01-01 RX ORDER — LORAZEPAM 2 MG/ML
1 INJECTION INTRAMUSCULAR EVERY 8 HOURS
Status: DISCONTINUED | OUTPATIENT
Start: 2021-12-01 | End: 2021-01-01

## 2021-01-01 RX ORDER — HYDROCODONE BITARTRATE AND ACETAMINOPHEN 7.5; 325 MG/1; MG/1
1 TABLET ORAL ONCE
Status: COMPLETED | OUTPATIENT
Start: 2021-01-01 | End: 2021-01-01

## 2021-01-01 RX ORDER — DEXTROSE MONOHYDRATE 25 G/50ML
25 INJECTION, SOLUTION INTRAVENOUS ONCE
Status: COMPLETED | OUTPATIENT
Start: 2021-01-01 | End: 2021-01-01

## 2021-01-01 RX ORDER — LORAZEPAM 2 MG/ML
4 INJECTION INTRAMUSCULAR
Status: DISCONTINUED | OUTPATIENT
Start: 2021-01-01 | End: 2021-01-01

## 2021-01-01 RX ORDER — NOREPINEPHRINE BIT/0.9 % NACL 8 MG/250ML
.02-.3 INFUSION BOTTLE (ML) INTRAVENOUS
Status: DISCONTINUED | OUTPATIENT
Start: 2021-01-01 | End: 2021-01-01 | Stop reason: HOSPADM

## 2021-01-01 RX ORDER — ALBUMIN (HUMAN) 12.5 G/50ML
25 SOLUTION INTRAVENOUS ONCE
Status: COMPLETED | OUTPATIENT
Start: 2021-01-01 | End: 2021-01-01

## 2021-01-01 RX ADMIN — Medication 0.3 MCG/KG/MIN: at 09:57

## 2021-01-01 RX ADMIN — EPINEPHRINE 1 MG: 0.1 INJECTION INTRACARDIAC; INTRAVENOUS at 22:08

## 2021-01-01 RX ADMIN — SODIUM CHLORIDE 8 MG/HR: 900 INJECTION INTRAVENOUS at 23:16

## 2021-01-01 RX ADMIN — MORPHINE SULFATE 2 MG/HR: 10 INJECTION, SOLUTION INTRAMUSCULAR; INTRAVENOUS at 14:08

## 2021-01-01 RX ADMIN — Medication 0.2 MCG/KG/MIN: at 22:20

## 2021-01-01 RX ADMIN — SODIUM BICARBONATE 150 MEQ: 84 INJECTION INTRAVENOUS at 07:00

## 2021-01-01 RX ADMIN — SODIUM CHLORIDE 500 ML: 9 INJECTION, SOLUTION INTRAVENOUS at 22:01

## 2021-01-01 RX ADMIN — FUROSEMIDE 60 MG: 10 INJECTION, SOLUTION INTRAMUSCULAR; INTRAVENOUS at 02:16

## 2021-01-01 RX ADMIN — SODIUM CHLORIDE 8 MG/HR: 900 INJECTION INTRAVENOUS at 04:28

## 2021-01-01 RX ADMIN — SODIUM CHLORIDE 8 MG/HR: 900 INJECTION INTRAVENOUS at 09:05

## 2021-01-01 RX ADMIN — FUROSEMIDE 40 MG: 10 INJECTION INTRAMUSCULAR; INTRAVENOUS at 06:52

## 2021-01-01 RX ADMIN — SODIUM CHLORIDE 1878 ML: 9 INJECTION, SOLUTION INTRAVENOUS at 22:01

## 2021-01-01 RX ADMIN — SODIUM BICARBONATE 50 MEQ: 84 INJECTION INTRAVENOUS at 22:06

## 2021-01-01 RX ADMIN — EPINEPHRINE 1 MG: 0.1 INJECTION INTRACARDIAC; INTRAVENOUS at 21:59

## 2021-01-01 RX ADMIN — TAZOBACTAM SODIUM AND PIPERACILLIN SODIUM 3.38 G: 375; 3 INJECTION, SOLUTION INTRAVENOUS at 06:31

## 2021-01-01 RX ADMIN — ROCURONIUM BROMIDE 80 MG: 50 INJECTION INTRAVENOUS at 22:13

## 2021-01-01 RX ADMIN — TAZOBACTAM SODIUM AND PIPERACILLIN SODIUM 3.38 G: 375; 3 INJECTION, SOLUTION INTRAVENOUS at 23:16

## 2021-01-01 RX ADMIN — IPRATROPIUM BROMIDE AND ALBUTEROL SULFATE 3 ML: 2.5; .5 SOLUTION RESPIRATORY (INHALATION) at 10:39

## 2021-01-01 RX ADMIN — IOPAMIDOL 100 ML: 612 INJECTION, SOLUTION INTRAVENOUS at 19:06

## 2021-01-01 RX ADMIN — VANCOMYCIN HYDROCHLORIDE 1250 MG: 10 INJECTION, POWDER, LYOPHILIZED, FOR SOLUTION INTRAVENOUS at 06:31

## 2021-01-01 RX ADMIN — EPINEPHRINE 0.1 MCG/KG/MIN: 1 INJECTION PARENTERAL at 23:56

## 2021-01-01 RX ADMIN — IOPAMIDOL 80 ML: 755 INJECTION, SOLUTION INTRAVENOUS at 01:40

## 2021-01-01 RX ADMIN — OCTREOTIDE ACETATE 25 MCG/HR: 500 INJECTION, SOLUTION INTRAVENOUS; SUBCUTANEOUS at 01:07

## 2021-01-01 RX ADMIN — ALBUMIN HUMAN 25 G: 0.25 SOLUTION INTRAVENOUS at 03:29

## 2021-01-01 RX ADMIN — SODIUM CHLORIDE, PRESERVATIVE FREE 10 ML: 5 INJECTION INTRAVENOUS at 09:04

## 2021-01-01 RX ADMIN — SCOPALAMINE 1 PATCH: 1 PATCH, EXTENDED RELEASE TRANSDERMAL at 14:28

## 2021-01-01 RX ADMIN — EPINEPHRINE 0.16 MCG/KG/MIN: 1 INJECTION PARENTERAL at 09:57

## 2021-01-01 RX ADMIN — MORPHINE SULFATE 4 MG: 4 INJECTION, SOLUTION INTRAMUSCULAR; INTRAVENOUS at 15:57

## 2021-01-01 RX ADMIN — PANTOPRAZOLE SODIUM 80 MG: 40 INJECTION, POWDER, FOR SOLUTION INTRAVENOUS at 23:19

## 2021-01-01 RX ADMIN — MAGNESIUM SULFATE HEPTAHYDRATE 2 G: 40 INJECTION, SOLUTION INTRAVENOUS at 00:57

## 2021-01-01 RX ADMIN — Medication 0.1 MCG/KG/MIN: at 23:56

## 2021-01-01 RX ADMIN — OCTREOTIDE ACETATE 50 MCG/HR: 500 INJECTION, SOLUTION INTRAVENOUS; SUBCUTANEOUS at 12:21

## 2021-01-01 RX ADMIN — SODIUM CHLORIDE 500 ML: 9 INJECTION, SOLUTION INTRAVENOUS at 15:53

## 2021-01-01 RX ADMIN — PHYTONADIONE 5 MG: 10 INJECTION, EMULSION INTRAMUSCULAR; INTRAVENOUS; SUBCUTANEOUS at 00:03

## 2021-01-01 RX ADMIN — EPINEPHRINE 1 MG: 0.1 INJECTION INTRACARDIAC; INTRAVENOUS at 22:05

## 2021-01-01 RX ADMIN — EPINEPHRINE 1 MG: 0.1 INJECTION INTRACARDIAC; INTRAVENOUS at 22:02

## 2021-01-01 RX ADMIN — ETOMIDATE 20 MG: 20 INJECTION, SOLUTION INTRAVENOUS at 22:13

## 2021-01-01 RX ADMIN — EPINEPHRINE 0.02 MCG/KG/MIN: 1 INJECTION PARENTERAL at 22:09

## 2021-01-01 RX ADMIN — HYDROCODONE BITARTRATE AND ACETAMINOPHEN 1 TABLET: 7.5; 325 TABLET ORAL at 20:29

## 2021-01-01 RX ADMIN — THIAMINE HYDROCHLORIDE 100 MG: 100 INJECTION, SOLUTION INTRAMUSCULAR; INTRAVENOUS at 07:08

## 2021-01-01 RX ADMIN — OCTREOTIDE ACETATE 50 MCG: 50 INJECTION, SOLUTION INTRAVENOUS; SUBCUTANEOUS at 01:06

## 2021-01-01 RX ADMIN — THIAMINE HYDROCHLORIDE 300 MG: 100 INJECTION, SOLUTION INTRAMUSCULAR; INTRAVENOUS at 09:39

## 2021-01-01 RX ADMIN — DEXTROSE MONOHYDRATE 25 G: 500 INJECTION PARENTERAL at 23:14

## 2021-01-01 RX ADMIN — ONDANSETRON HYDROCHLORIDE 4 MG: 2 SOLUTION INTRAMUSCULAR; INTRAVENOUS at 15:58

## 2021-03-01 NOTE — ED PROVIDER NOTES
"Subjective   History of Present Illness    Patient is a 61-year-old male presenting to ED with left rib pain after fall.  Medical history significant for previous stroke, hypertension, encephalopathy, GERD, COPD, hepatitis C, history of alcoholism, anxiety, hyperlipidemia.  Surgical history positive for cervical spine surgery. patient reports 2 days ago [place] when he had a mechanical trip and fall over a tree root.  Patient reports that he fell directly landing on the left side however he may have also hit the left side of his head.  Patient reports since this time he has had a hard time taking deep breaths and has developed shortness of breath \"due to the pain when I try to breathe.\"  Patient denies any other chest pain, palpitations, coughing, or breathing difficulties.  Patient reports he has a history of previous rib fractures and this feels very similar.  Patient denies any recent illnesses, other injury sustained in the fall.  Patient denies any medication use for his symptoms.    Patient is a current 1 pack/day cigarette smoker, former alcoholic who quit 5 years ago per patient.  Patient denies use of any further tobacco products, marijuana, or any other IV/recreational/illicit drugs.    Records reviewed show patient has previous visits for falls related to alcohol intoxication.  Patient was last admitted to the hospital on 8/11/2020 for acute renal failure, sepsis, decreased ADL, impaired immobility.  Patient was also admitted by Dr. Carver in 6/3/2019 for impaired functional mobility, balance, gait, and endurance.    Review of Systems   HENT: Negative for tinnitus.    Eyes: Negative for photophobia and visual disturbance.   Respiratory: Positive for shortness of breath (\"due to pain\"). Negative for chest tightness.    Cardiovascular: Positive for chest pain (left lateral ribs).   Gastrointestinal: Positive for abdominal pain (LUQ). Negative for nausea and vomiting. "   Genitourinary: Negative.    Musculoskeletal: Negative for arthralgias, back pain and neck pain.   Skin: Positive for wound (abrasion and bruising to left temporal).   Neurological: Negative for syncope.        Reports + head injury  Unsure if LOC   All other systems reviewed and are negative.      Past Medical History:   Diagnosis Date   • Anxiety    • COPD (chronic obstructive pulmonary disease) (CMS/HCC)    • Depression    • Encephalopathy    • GERD (gastroesophageal reflux disease)    • Hepatitis C    • Hyperlipidemia    • Hypertension    • Muscle weakness    • Stroke (CMS/HCC)        Allergies   Allergen Reactions   • Codeine Rash       Past Surgical History:   Procedure Laterality Date   • CERVICAL SPINE SURGERY     • CHOLECYSTECTOMY     • COLONOSCOPY     • ENDOSCOPY     • FEMUR SURGERY     • REPLACEMENT TOTAL KNEE BILATERAL         Family History   Problem Relation Age of Onset   • Cancer Mother    • Heart disease Father        Social History     Socioeconomic History   • Marital status: Single     Spouse name: Not on file   • Number of children: Not on file   • Years of education: Not on file   • Highest education level: Not on file   Tobacco Use   • Smoking status: Current Every Day Smoker   • Smokeless tobacco: Never Used   Substance and Sexual Activity   • Alcohol use: Yes   • Drug use: No   • Sexual activity: Defer           Objective   Physical Exam  Vitals signs and nursing note reviewed.   Constitutional:       Appearance: Normal appearance. He is well-developed, well-groomed and normal weight.      Comments: Appears uncomfortable due to pain, taking short shallow breaths and guarding left lower ribs   HENT:      Head: Normocephalic. Abrasion present. No laceration.      Jaw: There is normal jaw occlusion.        Comments: Approx 3 cm area in diameter of healing ecchymosis noted to the left temporal region with healed superficial abrasion to the center area.  Slight reproducible  tenderness.    Remainder of face and scalp are atraumatic with no further contusions, bruising, lacerations, abrasions, or signs of injury.  No further reproducible tenderness to palpitation.     Right Ear: No mastoid tenderness. No hemotympanum.      Left Ear: No mastoid tenderness. No hemotympanum.      Nose: Nose normal.      Mouth/Throat:      Mouth: Mucous membranes are moist.      Pharynx: Oropharynx is clear.      Comments: No intraoral injuries.  No dental abnormalities or injuries.  No tongue bite marks.  Eyes:      General: No scleral icterus.     Extraocular Movements: Extraocular movements intact.      Conjunctiva/sclera: Conjunctivae normal.      Pupils: Pupils are equal, round, and reactive to light.   Neck:      Musculoskeletal: Normal range of motion and neck supple. No muscular tenderness.   Cardiovascular:      Rate and Rhythm: Regular rhythm. Tachycardia present.   Pulmonary:      Effort: No respiratory distress.      Breath sounds: Normal breath sounds. No wheezing, rhonchi or rales.   Chest:      Chest wall: Tenderness present.          Comments: Reproducible tenderness to palpitation of left lower anterior and left lower lateral chest wall.  No evidence of external injury including no abrasions, lacerations, contusions, bruising.  Abdominal:      General: There is no distension. There are no signs of injury.      Palpations: Abdomen is soft.      Tenderness: There is abdominal tenderness in the left upper quadrant. There is no right CVA tenderness, left CVA tenderness or guarding.   Skin:     General: Skin is warm.      Findings: Abrasion (As described in HEENT section) and bruising (As described in HEENT section) present. No laceration, rash or wound.   Neurological:      General: No focal deficit present.      Mental Status: He is alert and oriented to person, place, and time.      GCS: GCS eye subscore is 4. GCS verbal subscore is 5. GCS motor subscore is 6.      Gait: Gait normal.    Psychiatric:         Mood and Affect: Mood and affect normal.         Speech: Speech normal.         Behavior: Behavior normal. Behavior is cooperative.         Procedures           ED Course  ED Course as of Mar 01 2051   Mon Mar 01, 2021   1541 Discussed case this time with Dr. Adan Mack who reports there is no need for further chest CT imaging.  No further recommendations at this time.    [JS]   2026 Patient reports that his rib pain was controlled and is increasing at this time, will order additional medications.  Patient has tolerated p.o. fluids and foods with no difficulty.  Patient advised of strict return precautions, need for outpatient follow-up.    [JS]      ED Course User Index  [JS] Shayne Wiley PA-C                                           MDM  Number of Diagnoses or Management Options  Diagnosis management comments: Patient is a 61-year-old male presenting to ED with left-sided rib pain after a mechanical trip and fall 2 days ago.  Patient concerned it felt similar to his previous rib fractures.  Physical examination notable for short shallow breaths and reproducible tenderness to palpitation of chest wall.  Chest x-ray with no evidence of acute fractures, evidence of multiple old healed rib fractures, no pneumothorax, no pneumonia.  CT of the abdomen and pelvis shows no acute findings, specifically no left upper quadrant concerns such as splenic injury.  Patient with hepatic steatosis, status post cholecystectomy, diverticulosis, degenerative spondylosis in the lumbar spine.  Patient noted to have abrasion and healing ecchymosis to the left temporal region for which she was unsure how he sustained initially.  Head CT shows mild cerebellar and cerebral volume loss with chronic microvascular disease but no evidence of acute process.  Patient's pain was controlled in ED.  Patient was able to tolerate p.o. fluids.  Patient advised on close outpatient follow-up and is stable for  discharge.       Amount and/or Complexity of Data Reviewed  Clinical lab tests: ordered and reviewed  Tests in the radiology section of CPT®: reviewed and ordered  Tests in the medicine section of CPT®: ordered and reviewed  Decide to obtain previous medical records or to obtain history from someone other than the patient: yes  Review and summarize past medical records: yes  Discuss the patient with other providers: yes (Dr. Mack (attending))    Patient Progress  Patient progress: improved      Final diagnoses:   Rib pain on left side   Fall, initial encounter   Hepatic steatosis   Contusion of rib on left side, initial encounter            Shayne Wiley PA-C  03/01/21 2051

## 2021-03-02 NOTE — DISCHARGE INSTRUCTIONS
You have no evidence of new rib fractures on your imaging today. There are no injuries to your abdomen or head.   Your old rib frcatures have healed.   Please take deep breaths to prevent pneumonia as your ribs heal.   Please continue using antiinflammatory medications and closely follow with your primary care provider.       Rib Contusion  A rib contusion is a deep bruise on your rib area. Contusions are the result of a blunt trauma that causes bleeding and injury to the tissues under the skin. A rib contusion may involve bruising of the ribs and of the skin and muscles in the area. The skin over the contusion may turn blue, purple, or yellow. Minor injuries will give you a painless contusion. More severe contusions may stay painful and swollen for a few weeks.  What are the causes?  This condition is usually caused by a blow, trauma, or direct force to an area of the body. This often occurs while playing contact sports.  What are the signs or symptoms?  Symptoms of this condition include:  · Swelling and redness of the injured area.  · Discoloration of the injured area.  · Tenderness and soreness of the injured area.  · Pain with or without movement.  How is this diagnosed?  This condition may be diagnosed based on:  · Your symptoms and medical history.  · A physical exam.  · Imaging tests--such as an X-ray, CT scan, or MRI--to determine if there were internal injuries or broken bones (fractures).  How is this treated?  This condition may be treated with:  · Rest. This is often the best treatment for a rib contusion.  · Icing. This reduces swelling and inflammation.  · Deep-breathing exercises. These may be recommended to reduce the risk for lung collapse and pneumonia.  · Medicines. Over-the-counter or prescription medicines may be given to control pain.  · Injection of a numbing medicine around the nerve near your injury (nerve block).  Follow these instructions at home:         Medicines  · Take  over-the-counter and prescription medicines only as told by your health care provider.  · Do not drive or use heavy machinery while taking prescription pain medicine.  · If you are taking prescription pain medicine, take actions to prevent or treat constipation. Your health care provider may recommend that you:  ? Drink enough fluid to keep your urine pale yellow.  ? Eat foods that are high in fiber, such as fresh fruits and vegetables, whole grains, and beans.  ? Limit foods that are high in fat and processed sugars, such as fried or sweet foods.  ? Take an over-the-counter or prescription medicine for constipation.  Managing pain, stiffness, and swelling  · If directed, put ice on the injured area:  ? Put ice in a plastic bag.  ? Place a towel between your skin and the bag.  ? Leave the ice on for 20 minutes, 2-3 times a day.  · Rest the injured area. Avoid strenuous activity and any activities or movements that cause pain. Be careful during activities and avoid bumping the injured area.  · Do not lift anything that is heavier than 5 lb (2.3 kg), or the limit that you are told, until your health care provider says that it is safe.  General instructions  · Do not use any products that contain nicotine or tobacco, such as cigarettes and e-cigarettes. These can delay healing. If you need help quitting, ask your health care provider.  · Do deep-breathing exercises as told by your health care provider.  · If you were given an incentive spirometer, use it every 1-2 hours while you are awake, or as recommended by your health care provider. This device measures how well you are filling your lungs with each breath.  · Keep all follow-up visits as told by your health care provider. This is important.  Contact a health care provider if you have:  · Increased bruising or swelling.  · Pain that is not controlled with treatment.  · A fever.  Get help right away if you:  · Have difficulty breathing or shortness of  breath.  · Develop a continual cough or you cough up thick or bloody sputum.  · Feel nauseous or you vomit.  · Have pain in your abdomen.  Summary  · A rib contusion is a deep bruise on your rib area. Contusions are the result of a blunt trauma that causes bleeding and injury to the tissues under the skin.  · The skin overlying the contusion may turn blue, purple, or yellow. Minor injuries may give you a painless contusion. More severe contusions may stay painful and swollen for a few weeks.  · Rest the injured area. Avoid strenuous activity and any activities or movements that cause pain.  This information is not intended to replace advice given to you by your health care provider. Make sure you discuss any questions you have with your health care provider.  Document Revised: 01/16/2019 Document Reviewed: 01/16/2019  Elsevier Patient Education © 2020 [employer]

## 2021-11-30 PROBLEM — K76.3 HEPATIC INFARCTION: Status: ACTIVE | Noted: 2021-01-01

## 2021-11-30 PROBLEM — K55.9 ISCHEMIC BOWEL SYNDROME (HCC): Status: ACTIVE | Noted: 2021-01-01

## 2021-11-30 PROBLEM — K92.2 GASTROINTESTINAL HEMORRHAGE: Status: ACTIVE | Noted: 2021-01-01

## 2021-11-30 PROBLEM — J69.0 ASPIRATION PNEUMONIA DUE TO VOMITUS (HCC): Status: ACTIVE | Noted: 2021-01-01

## 2021-11-30 PROBLEM — J44.9 CHRONIC OBSTRUCTIVE PULMONARY DISEASE, UNSPECIFIED (HCC): Status: ACTIVE | Noted: 2020-01-02

## 2021-11-30 PROBLEM — B19.20 HEPATITIS C: Status: ACTIVE | Noted: 2021-01-01

## 2021-11-30 PROBLEM — D62 ACUTE BLOOD LOSS ANEMIA: Status: ACTIVE | Noted: 2021-01-01

## 2021-11-30 PROBLEM — N28.0 RENAL INFARCT (HCC): Status: ACTIVE | Noted: 2021-01-01

## 2021-11-30 PROBLEM — K26.9 DU (DUODENAL ULCER): Status: ACTIVE | Noted: 2021-01-01

## 2021-11-30 PROBLEM — K74.60 UNSPECIFIED CIRRHOSIS OF LIVER (HCC): Status: ACTIVE | Noted: 2020-01-02

## 2021-11-30 PROBLEM — E87.20 METABOLIC ACIDOSIS: Status: ACTIVE | Noted: 2021-01-01

## 2021-11-30 PROBLEM — F10.239 ALCOHOL DEPENDENCE WITH WITHDRAWAL WITH COMPLICATION (HCC): Status: ACTIVE | Noted: 2021-01-01

## 2021-11-30 PROBLEM — D73.5 SPLENIC INFARCT: Status: ACTIVE | Noted: 2021-01-01

## 2021-12-01 LAB
BACTERIA SPEC AEROBE CULT: NO GROWTH
BH BB BLOOD EXPIRATION DATE: NORMAL
BH BB BLOOD TYPE BARCODE: 5100
BH BB BLOOD TYPE BARCODE: 6200
BH BB DISPENSE STATUS: NORMAL
BH BB PRODUCT CODE: NORMAL
BH BB UNIT NUMBER: NORMAL
CROSSMATCH INTERPRETATION: NORMAL
QT INTERVAL: 310 MS
QTC INTERVAL: 387 MS
UNIT  ABO: NORMAL
UNIT  RH: NORMAL

## 2021-12-01 NOTE — PAYOR COMM NOTE
"REF: [id number]    [facility]  ALISSA  [phone]  OR  FAX  [phone]      Katy Rea (Dcsd. Male)             Date of Birth Social Security Number Address Home Phone MRN    1960  [address] [place] [address] [phone] [id number]    Alevism Marital Status             [facility]       Admission Date Admission Type Admitting Provider Attending Provider Department, Room/Bed    21 Emergency Spike Rodriguez MD  [facility] CARDIAC [facility], C011/    Discharge Date Discharge Disposition Discharge Destination          2021               Attending Provider: (none)   Allergies: Codeine    Isolation: None   Infection: None   Code Status: Prior   Advance Care Planning Activity    Ht: 165.1 cm (65\")   Wt: 55 kg (121 lb 3.2 oz)    Admission Cmt: None   Principal Problem: Acute hypoxemic respiratory failure (HCC) [J96.01]                 Active Insurance as of 2021     Primary Coverage     Payor Plan Insurance Group Employer/Plan Group    ANTHEM MEDICAID ANTHEM MEDICAID [id number]     Payor Plan Address Payor Plan Phone Number Payor Plan Fax Number Effective Dates    [address] [address] [phone]  2020 - None Entered    [facility] [id number]       Subscriber Name Subscriber Birth Date Member ID       KATY REA 1960 [id number]                 Emergency Contacts      (Rel.) Home Phone Work Phone Mobile Phone    Tea Lux (Daughter) [phone] -- [phone]               Discharge Summary      Spike Rodriguez MD at 21 1710            [facility]  DEATH SUMMARY       Date of Admission: 2021  Date of Death:  2021 at approximately 1639  Primary Care Physician: Reyes Soto MD    Presenting Problem/History of Present Illness:  Gastrointestinal hemorrhage, unspecified gastrointestinal hemorrhage type [K92.2]     Final Death " "Diagnoses:  Active Hospital Problems    Diagnosis    • **Acute hypoxemic respiratory failure (HCC)    • Gastrointestinal hemorrhage    • Ischemic bowel syndrome (HCC)    • Acute blood loss anemia    • Aspiration pneumonia due to vomitus (HCC)    • Alcohol dependence with withdrawal with complication (HCC)    • Splenic infarct    • Renal infarct (HCC)    • Hepatic infarction    • Hepatitis C    • Metabolic acidosis    • Sepsis, unspecified organism (HCC)    • Unspecified cirrhosis of liver (HCC) due to alcohol and cirrhosis     • Lactic acidosis        Consults:   GI  Vasulcar Surgery  Pulmonary    Procedures Performed:   Intubated  Central venous catheter    Pertinent Test Results: See chart    Hospital Course:  The patient is a 61 y.o. male who presented to [facility] with being found down.      Per HPI per Dr. Saldivar:  \"Patient is a 41-year-old white male past medical history of previous stroke COPD hepatitis C untreated.  Presents to the hospital via EMS unresponsive.  Apparently patient's been sleeping for 2 days family found of his pulseless for unknown amount of time.  CPR was initiated by EMS ROSC was obtained.  Shortly after arriving in the emergency room patient vomited a significantly large amount of bright red blood mixed with dark red blood.  He was emergently intubated once again pulse was lost in the ER.  Patient was moving but not purposefully and was still unresponsive prior to intubation.  Labs were obtained patient was found to have hemoglobin of 3 also significant coagulopathy.  Mass infusion protocol was initiated and patient being transfused in process sedated.  He is requiring pressors as well.  He has been given albumin FFP platelets and blood.  CT scan shows possible ischemia in the stomach uncertain etiology.  Also evidence of a nodular liver.  Renal changes are also concerning for pyelonephritis or infarcts.  CTA of the chest shows right lower lobe consolidation as well as left " "lower lobe consolidation.  Mucous is in the trachea extending into the bronchi tree-in-bud opacities are noted.  Patient is being admitted in critical condition with a GI bleed anemia coagulopathy likely aspiration pneumonia possibly ischemic bowel.\"    Hospital Course:    Patient was found to be coagulopathic and in multiorgan failure.  Patient was not placed on any sedcation and did not have gag, cough, pain, pupillary reflexes.  Patient suspected to have anoxic brain injury as well as progressively worsening acute on chronic liver failure, respiratory failure, kidney injury.  Patient noted significant infarctions of liver, spleen, bilateral kidneys.  Patient presented with a lactic acid >25.  Long discussion was had with family as his condition carries an excessively high mortality and poor prognosis if he were to somehow survive.  Family elected comfort care and patient passed away comfortably with family at bedside.         Electronically signed by Spike Rdoriguez MD, 11/30/21, 17:10 CST.    Time: 45 minutes      Electronically signed by Spike Rodriguez MD at 11/30/21 2041       Discharge Order (From admission, onward)    None        "

## 2021-12-02 LAB
BACTERIA SPEC AEROBE CULT: ABNORMAL
GRAM STN SPEC: ABNORMAL
GRAM STN SPEC: ABNORMAL
ISOLATED FROM: ABNORMAL